# Patient Record
Sex: FEMALE | Race: WHITE | Employment: OTHER | URBAN - METROPOLITAN AREA
[De-identification: names, ages, dates, MRNs, and addresses within clinical notes are randomized per-mention and may not be internally consistent; named-entity substitution may affect disease eponyms.]

---

## 2017-02-24 ENCOUNTER — NURSING HOME VISIT (OUTPATIENT)
Dept: GERIATRICS | Facility: CLINIC | Age: 82
End: 2017-02-24
Payer: COMMERCIAL

## 2017-02-24 VITALS
HEART RATE: 71 BPM | RESPIRATION RATE: 18 BRPM | TEMPERATURE: 97.4 F | DIASTOLIC BLOOD PRESSURE: 78 MMHG | SYSTOLIC BLOOD PRESSURE: 138 MMHG | WEIGHT: 167.4 LBS | OXYGEN SATURATION: 94 % | BODY MASS INDEX: 27.86 KG/M2

## 2017-02-24 DIAGNOSIS — M15.0 PRIMARY OSTEOARTHRITIS INVOLVING MULTIPLE JOINTS: ICD-10-CM

## 2017-02-24 DIAGNOSIS — G30.1 LATE ONSET ALZHEIMER'S DISEASE WITHOUT BEHAVIORAL DISTURBANCE (H): ICD-10-CM

## 2017-02-24 DIAGNOSIS — F02.80 LATE ONSET ALZHEIMER'S DISEASE WITHOUT BEHAVIORAL DISTURBANCE (H): ICD-10-CM

## 2017-02-24 DIAGNOSIS — I10 ESSENTIAL HYPERTENSION: Primary | ICD-10-CM

## 2017-02-24 PROCEDURE — 99310 SBSQ NF CARE HIGH MDM 45: CPT | Performed by: NURSE PRACTITIONER

## 2017-02-24 PROCEDURE — 99207 ZZC CDG-UP CODE -MED NECESSITY: CPT | Performed by: NURSE PRACTITIONER

## 2017-02-24 NOTE — PROGRESS NOTES
[facility]    Chief Complaint   Patient presents with     [facility]PC Regulatory       HPI:    Lesvia Barney is a 88 year old  (10/29/1928), who is being seen today for a federally mandated E/M visit at [facility].       Today's concerns are:  Essential hypertension  BP readings range:  138/78  134/65  156/87  142/76  160/89  165/93  135/61  122/79  131/68  143/98  -Denies chest pain, SOB, or palpation    Late onset Alzheimer's disease without behavioral disturbance  BIMS score = 14/15.   -No behaviors noted by staff    Primary osteoarthritis involving multiple joints  -Denies pain       ALLERGIES: No known allergies  PAST MEDICAL HISTORY:  has a past medical history of HTN (hypertension). She also has no past medical history of Chronic infection; Malignant hyperthermia; or Sleep apnea.  PAST SURGICAL HISTORY:  has a past surgical history that includes appendectomy; Open reduction internal fixation hip nailing (1/12/2014); Open reduction internal fixation hip (unk, prior to 2014); Arthroplasty hip (Right, 8/31/2015); and Remove hardware hip nailing (Right, 8/31/2015).  FAMILY HISTORY: family history includes C.A.D. in her father; Prostate Cancer in her brother.  SOCIAL HISTORY:  reports that she has never smoked. She does not have any smokeless tobacco history on file. She reports that she drinks alcohol. She reports that she does not use illicit drugs.    MEDICATIONS:  Current Outpatient Prescriptions   Medication Sig Dispense Refill     Oseltamivir Phosphate (TAMIFLU PO) Take 75 mg by mouth daily       senna-docusate (SENOKOT-S;PERICOLACE) 8.6-50 MG per tablet Take 1 tablet by mouth daily as needed for constipation       trolamine salicylate (ASPERCREME) 10 % cream Apply topically 3 times daily as needed Apply to left knee        Acetaminophen (TYLENOL PO) Take 650 mg by mouth every 4 hours as needed for mild pain or fever       ORDER FOR DME Equipment being ordered: Walker  Wheels () and Walker ()  Treatment Diagnosis: Impaired functional mobility tolerance and safety concerns with gait and transfer skills. 1 each 0     multivitamin, therapeutic with minerals (THERA-VIT-M) TABS Take 1 tablet by mouth daily         Case Management:  I have reviewed the care plan and MDS and do agree with the plan. Patient's desire to return to the community is not present.  Information reviewed:  Medications, vital signs, orders, and nursing notes.    ROS:  Unobtainable secondary to cognitive impairment or aphasia.    Exam:  /78  Pulse 71  Temp 97.4  F (36.3  C)  Resp 18  Wt 167 lb 6.4 oz (75.9 kg)  SpO2 94%  BMI 27.86 kg/m2  GENERAL APPEARANCE:  Alert  ENT:  Mouth and posterior oropharynx normal, moist mucous membranes  RESP:  respiratory effort and palpation of chest normal, lungs clear to auscultation   CV:  Palpation and auscultation of heart done , regular rate and rhythm, no murmur, rub, or gallop  M/S:   active movement of bilateral upper and lower extremities   SKIN:  Inspection of skin and subcutaneous tissue baseline, Palpation of skin and subcutaneous tissue baseline  NEURO:   Cranial nerves 2-12 are normal tested and grossly at patient's baseline  PSYCH:  memory impaired , affect and mood normal    Lab/Diagnostic data:      CBC RESULTS:   Recent Labs   Lab Test 12/13/16 11/17/15   WBC  7.5  8.2   RBC  5.30  5.24   HGB  16.0  15.8   HCT  50.6*  50.4*   MCV  96  96   MCH  30.2  30.2   MCHC  31.6*  31.3*   RDW  14.3  13.2   PLT  254  303       Last Basic Metabolic Panel:  Recent Labs   Lab Test 12/13/16 11/17/15   NA  139  141   POTASSIUM  4.8  4.6   CHLORIDE  108*  106   JANELLE  9.0  9.6   CO2  22  25   BUN  17  18   CR  0.95  0.80   GLC  78  92       Liver Function Studies -   Recent Labs   Lab Test  01/11/14   1455   PROTTOTAL  6.9   ALBUMIN  3.8   BILITOTAL  0.5   ALKPHOS  63   AST  26   ALT  33         ASSESSMENT/PLAN  (I10) Essential hypertension  (primary encounter  diagnosis)  -Stable without medication   -Keep SBP> 130 mmHg and DBP > 65 mmHg (levels below these increase mortality as shown by standard studies and observations).     (G30.1,  F02.80) Late onset Alzheimer's disease without behavioral disturbance  -Chronic, progressive. Needs 24 hour skilled nursing care. HPI/ROS difficult to obtain with cognitive impairment. Expect further functional and cognitive decline. Expect weight loss. Continue 24 hour care. Monitor weight. Monitor functional status. Monitor for behavioral disturbances.    (M15.0) Primary osteoarthritis involving multiple joints  -asymptomatic   -ambulates with walker      Total time spent with patient visit was 35 min including patient visit and review of past records.Greater than 50% of total time spent with counseling and coordinating care.    Electronically signed by:  GI Ash CNP

## 2017-03-01 ENCOUNTER — TRANSFERRED RECORDS (OUTPATIENT)
Dept: HEALTH INFORMATION MANAGEMENT | Facility: CLINIC | Age: 82
End: 2017-03-01

## 2017-03-01 LAB
ANION GAP SERPL CALCULATED.3IONS-SCNC: 9 MMOL/L (ref 5–18)
BUN SERPL-MCNC: 11 MG/DL (ref 8–28)
CALCIUM SERPL-MCNC: 10.1 MG/DL (ref 8.5–10.5)
CHLORIDE SERPLBLD-SCNC: 106 MMOL/L (ref 98–107)
CO2 SERPL-SCNC: 26 MMOL/L (ref 22–31)
CREAT SERPL-MCNC: 0.9 MG/DL (ref 0.6–1.1)
ERYTHROCYTE [DISTWIDTH] IN BLOOD BY AUTOMATED COUNT: 14.3 % (ref 11–14.5)
GFR SERPL CREATININE-BSD FRML MDRD: 59 ML/MIN/1.73M2
GLUCOSE SERPL-MCNC: 98 MG/DL (ref 70–125)
HCT VFR BLD AUTO: 53.7 % (ref 35–47)
HEMOGLOBIN: 16.8 G/DL (ref 12–16)
MCH RBC QN AUTO: 30 PG (ref 27–34)
MCHC RBC AUTO-ENTMCNC: 31.3 G/DL (ref 32–36)
MCV RBC AUTO: 96 FL (ref 80–100)
PLATELET # BLD AUTO: 320 THOU/UL (ref 140–440)
POTASSIUM SERPL-SCNC: 4.3 MMOL/L (ref 3.5–5)
RBC # BLD AUTO: 5.6 MILL/UL (ref 3.8–5.4)
SODIUM SERPL-SCNC: 141 MMOL/L (ref 136–145)
WBC # BLD AUTO: 9.7 THOU/UL (ref 4–11)

## 2017-04-07 ENCOUNTER — CLINICAL UPDATE (OUTPATIENT)
Dept: PHARMACY | Facility: CLINIC | Age: 82
End: 2017-04-07

## 2017-04-07 NOTE — PROGRESS NOTES
This patient's medication list and chart were reviewed as part of the service provided by [facility].    Assessment/Recommendations:  No recommendations for changes at this time.    Meena Lerner, Pharm.D.,[facility] [place]  Board Certified Geriatric Pharmacist  Medication Therapy Management Pharmacist  [phone]

## 2017-04-10 ENCOUNTER — NURSING HOME VISIT (OUTPATIENT)
Dept: GERIATRICS | Facility: CLINIC | Age: 82
End: 2017-04-10
Payer: COMMERCIAL

## 2017-04-10 VITALS
DIASTOLIC BLOOD PRESSURE: 76 MMHG | SYSTOLIC BLOOD PRESSURE: 123 MMHG | HEART RATE: 84 BPM | RESPIRATION RATE: 18 BRPM | WEIGHT: 166.5 LBS | TEMPERATURE: 97.9 F | BODY MASS INDEX: 27.71 KG/M2 | OXYGEN SATURATION: 98 %

## 2017-04-10 DIAGNOSIS — I10 ESSENTIAL HYPERTENSION: Primary | ICD-10-CM

## 2017-04-10 DIAGNOSIS — F02.80 LATE ONSET ALZHEIMER'S DISEASE WITHOUT BEHAVIORAL DISTURBANCE (H): ICD-10-CM

## 2017-04-10 DIAGNOSIS — G30.1 LATE ONSET ALZHEIMER'S DISEASE WITHOUT BEHAVIORAL DISTURBANCE (H): ICD-10-CM

## 2017-04-10 PROCEDURE — 99308 SBSQ NF CARE LOW MDM 20: CPT | Performed by: INTERNAL MEDICINE

## 2017-04-10 PROCEDURE — 99207 ZZC CDG-CORRECTLY CODED, REVIEWED AND AGREE: CPT | Performed by: INTERNAL MEDICINE

## 2017-04-10 NOTE — PROGRESS NOTES
"Lesvia Barney is a 88 year old female seen April 10, 2017 at [facility] where she has resided for one and a half years (admit 2015) seen for routine visit.    Patient is seen in her room, sitting idly.  Smiling and pleasant.   She reports \"I feel good.   I never smoked, and I'm [place], so I'm healthy.\"    Patient has dementia, unable to manage independently in the community, unsafe living conditions there and she was not receiving appropriate medical care.   Doing much better in [facility].         No behavioral concerns reported by nursing staff.    Has HTN by history, but sbps here fairly stable without anti-hypertensive tx; recently 122-165 sbps.    She has prns available but does not take any scheduled meds.          Past Medical History   Diagnosis Date     HTN (hypertension)    Grade I LV diastolic dysfunction, EF 55-60% by ECHO   Dementia, Alzheimer's type  DJD   S/P femoral neck fracture , with subsequent AVN requiring ATIYA revision with hardware removal 2015      SH:  Single, retired from working in a bank.   Her brother  6 years ago leaving her without any close kin.   A neighbor Sarah is her POA.     ROS:  Negative except for above    EXAM:  NAD  There were no vitals taken for this visit.   Neck supple without adenopathy  Lungs with decreased BS, no rales or wheeze  Heart RRR s1s2, without murmur  Abd soft, NT, no distention, +BS, no masses, no rebound or guarding.     Ext trace ankle edema L>R, valgus deformity of left knee  Neuro: no tremor or stiffness, +wordfinding difficulty.     Lab Results   Component Value Date    WBC 9.7 2017     Lab Results   Component Value Date    HGB 16.8 2017     Lab Results   Component Value Date    MCV 96 2017     Lab Results   Component Value Date     2017      Last Basic Metabolic Panel:  Lab Results   Component Value Date     2017      Lab Results   Component Value Date    POTASSIUM 4.3 " 03/01/2017     Lab Results   Component Value Date    CHLORIDE 106 03/01/2017     Lab Results   Component Value Date    JANELLE 10.1 03/01/2017     Lab Results   Component Value Date    CO2 26 03/01/2017     Lab Results   Component Value Date    BUN 11 03/01/2017     Lab Results   Component Value Date    CR 0.90 03/01/2017     Lab Results   Component Value Date    GLC 98 03/01/2017         IMP/PLAN:   Hip DJD / Right hip pain  Comment: s/p removal of hardware from previous ORIF, and new ATIYA placment     Plan: Ambulation with walker only.       Dementia in conditions classified elsewhere without behavioral disturbance, Alzheimer's type  Comment:  gradual decline over past few years, now with loss of language and functional status  Plan: Board and Care for assist with meals, meds, cares, activity.       HTN (hypertension)  Comment:  SBPs a little higher recently.    Plan: continue to follow bps, would add lisinopril if SBPs consistently >140        Advanced directives: DNR/DNI.   Friend Sarah Irving is first contact.      Nabila Velasquez MD

## 2017-06-20 ENCOUNTER — NURSING HOME VISIT (OUTPATIENT)
Dept: GERIATRICS | Facility: CLINIC | Age: 82
End: 2017-06-20
Payer: COMMERCIAL

## 2017-06-20 VITALS
SYSTOLIC BLOOD PRESSURE: 143 MMHG | WEIGHT: 163.5 LBS | BODY MASS INDEX: 25.66 KG/M2 | HEIGHT: 67 IN | HEART RATE: 76 BPM | RESPIRATION RATE: 18 BRPM | OXYGEN SATURATION: 95 % | DIASTOLIC BLOOD PRESSURE: 86 MMHG | TEMPERATURE: 97 F

## 2017-06-20 DIAGNOSIS — I10 ESSENTIAL HYPERTENSION: Primary | ICD-10-CM

## 2017-06-20 DIAGNOSIS — M15.0 PRIMARY OSTEOARTHRITIS INVOLVING MULTIPLE JOINTS: ICD-10-CM

## 2017-06-20 DIAGNOSIS — F02.80 LATE ONSET ALZHEIMER'S DISEASE WITHOUT BEHAVIORAL DISTURBANCE (H): ICD-10-CM

## 2017-06-20 DIAGNOSIS — G30.1 LATE ONSET ALZHEIMER'S DISEASE WITHOUT BEHAVIORAL DISTURBANCE (H): ICD-10-CM

## 2017-06-20 PROCEDURE — 99310 SBSQ NF CARE HIGH MDM 45: CPT | Performed by: NURSE PRACTITIONER

## 2017-06-20 PROCEDURE — 99207 ZZC CDG-UP CODE -MED NECESSITY: CPT | Performed by: NURSE PRACTITIONER

## 2017-06-20 NOTE — PROGRESS NOTES
[place] [facility]    Chief Complaint   Patient presents with     [facility] Regulatory       HPI:    Lesvia Barney is a 88 year old  (10/29/1928), who is being seen today for a federally mandated E/M visit at [facility].  HPI information obtained from: facility chart records.     Today's concerns are:  Essential hypertension  Denies CP, Palpation, or SOB  -Currently off antihypertensive agents     Late onset Alzheimer's disease without behavioral disturbance  -Last BIMS 12/15  -Behavior and mood improved since admission to memory care.   -Reports everything is fine.     Primary osteoarthritis involving multiple joints  -Denies pain.   -Currently has tylenol PRN      BP 5/12-6/16: 101-143/69-86 mmHg  Weight 5/2-6/17: 155- 163.5lbs         ALLERGIES: No known allergies  PAST MEDICAL HISTORY:  has a past medical history of HTN (hypertension). She also has no past medical history of Chronic infection; Malignant hyperthermia; or Sleep apnea.  PAST SURGICAL HISTORY:  has a past surgical history that includes appendectomy; Open reduction internal fixation hip nailing (1/12/2014); Open reduction internal fixation hip (unk, prior to 2014); Arthroplasty hip (Right, 8/31/2015); and Remove hardware hip nailing (Right, 8/31/2015).  FAMILY HISTORY: family history includes C.A.D. in her father; Prostate Cancer in her brother.  SOCIAL HISTORY:  reports that she has never smoked. She does not have any smokeless tobacco history on file. She reports that she drinks alcohol. She reports that she does not use illicit drugs.    MEDICATIONS:  Current Outpatient Prescriptions   Medication Sig Dispense Refill     AMOXICILLIN PO Give 2 gram by mouth as needed for Prophylaxis Dental Appointment       senna-docusate (SENOKOT-S;PERICOLACE) 8.6-50 MG per tablet Take 1 tablet by mouth daily as needed for constipation       trolamine salicylate (ASPERCREME) 10 % cream Apply topically 3 times daily as needed Apply  "to left knee        Acetaminophen (TYLENOL PO) Take 650 mg by mouth every 4 hours as needed for mild pain or fever       ORDER FOR DME Equipment being ordered: Walker Wheels () and Walker ()  Treatment Diagnosis: Impaired functional mobility tolerance and safety concerns with gait and transfer skills. 1 each 0     multivitamin, therapeutic with minerals (THERA-VIT-M) TABS Take 1 tablet by mouth daily       Medications reviewed:  Medications reconciled to facility chart and changes were made to reflect current medications as identified as above med list. Below are the changes that were made:   Medications stopped since last EPIC medication reconciliation:   There are no discontinued medications.    Medications started since last [facility] medication reconciliation:  No orders of the defined types were placed in this encounter.        Case Management:  I have reviewed the care plan and MDS and do agree with the plan. Patient's desire to return to the community is present, but is not able due to care needs .  Information reviewed:  Medications, vital signs, orders, and nursing notes.    ROS:  Unobtainable secondary to cognitive impairment or aphasia. and 4 point ROS including Respiratory, CV, GI and , other than that noted in the HPI,  is negative    Exam:  Vitals: /86  Pulse 76  Temp 97  F (36.1  C)  Resp 18  Ht 5' 7\" (1.702 m)  Wt 163 lb 8 oz (74.2 kg)  SpO2 95%  BMI 25.61 kg/m2  BMI= Body mass index is 25.61 kg/(m^2).  GENERAL APPEARANCE:  Alert, in no distress  ENT:  Mouth and posterior oropharynx normal, moist mucous membranes  EYES:  EOM, conjunctivae, lids, pupils and irises normal  RESP:  respiratory effort and palpation of chest normal, lungs clear to auscultation   CV:  Palpation and auscultation of heart done , regular rate and rhythm, no murmur, rub, or gallop  ABDOMEN:  normal bowel sounds, soft, nontender, no hepatosplenomegaly or other masses  M/S:   Gait and station normal  Digits " and nails normal  SKIN:  Inspection of skin and subcutaneous tissue baseline, Palpation of skin and subcutaneous tissue baseline    Lab/Diagnostic data:   CBC RESULTS:   Recent Labs   Lab Test 03/01/17 12/13/16   WBC  9.7  7.5   RBC  5.60*  5.30   HGB  16.8*  16.0   HCT  53.7*  50.6*   MCV  96  96   MCH  30.0  30.2   MCHC  31.3*  31.6*   RDW  14.3  14.3   PLT  320  254       Last Basic Metabolic Panel:  Recent Labs   Lab Test 03/01/17 12/13/16   NA  141  139   POTASSIUM  4.3  4.8   CHLORIDE  106  108*   JANELLE  10.1  9.0   CO2  26  22   BUN  11  17   CR  0.90  0.95   GLC  98  78     Liver Function Studies -   Recent Labs   Lab Test  01/11/14   1455   PROTTOTAL  6.9   ALBUMIN  3.8   BILITOTAL  0.5   ALKPHOS  63   AST  26   ALT  33       ASSESSMENT/PLAN  (I10) Essential hypertension  (primary encounter diagnosis)  -Chronic, stable off medications  -Keep SBP> 130 mmHg and DBP > 65 mmHg (levels below these increase mortality as shown by standard studies and observations).     (G30.1,  F02.80) Late onset Alzheimer's disease without behavioral disturbance  -resides on secure dementia unit with assistance for ADLs and meals.  -No changes at this time. Continue to monitor mood, behaviors and weight. Notify NP with changes.     (M15.0) Primary osteoarthritis involving multiple joints  -No changes at this time. Continue tylenol PRN.     Orders:  The current medical regimen is effective; continue present plan and medications.    Total time spent with patient visit at the skilled nursing facility was 35 min including patient visit and review of past records. Greater than 50% of total time spent with counseling and coordinating care due to multiple comorbidities     Electronically signed by:  GI Ash CNP  [facility]

## 2017-08-08 ENCOUNTER — NURSING HOME VISIT (OUTPATIENT)
Dept: GERIATRICS | Facility: CLINIC | Age: 82
End: 2017-08-08
Payer: COMMERCIAL

## 2017-08-08 VITALS
RESPIRATION RATE: 18 BRPM | HEART RATE: 80 BPM | BODY MASS INDEX: 24.62 KG/M2 | DIASTOLIC BLOOD PRESSURE: 65 MMHG | OXYGEN SATURATION: 97 % | SYSTOLIC BLOOD PRESSURE: 109 MMHG | TEMPERATURE: 98 F | WEIGHT: 157.2 LBS

## 2017-08-08 DIAGNOSIS — F02.80 LATE ONSET ALZHEIMER'S DISEASE WITHOUT BEHAVIORAL DISTURBANCE (H): ICD-10-CM

## 2017-08-08 DIAGNOSIS — M15.0 PRIMARY OSTEOARTHRITIS INVOLVING MULTIPLE JOINTS: Primary | ICD-10-CM

## 2017-08-08 DIAGNOSIS — G30.1 LATE ONSET ALZHEIMER'S DISEASE WITHOUT BEHAVIORAL DISTURBANCE (H): ICD-10-CM

## 2017-08-08 PROCEDURE — 99309 SBSQ NF CARE MODERATE MDM 30: CPT | Performed by: INTERNAL MEDICINE

## 2017-08-13 NOTE — PROGRESS NOTES
Levsia Barney is a 88 year old female seen 2017 at [facility] where she has resided for 2 years (admit 2015) seen for routine visit.    Patient is seen in Day Room, alert and pleasant, states she feels well.       Patient has dementia, unable to manage independently in the community, unsafe living conditions there and she was not receiving appropriate medical care.   Doing much better in [facility] and now well settled in her routine.      No behavioral concerns reported by nursing staff.    Has HTN by history, but sbps here fairly stable without anti-hypertensive tx; recently 101-143 sbps.    She has prns available but does not take any scheduled meds.      She remains ambulatory with a 4WW but with significant deformity of LE joints, flexed at knees and hips, forward stoop.   Says no to current pain or other symptoms.         Past Medical History   Diagnosis Date     HTN (hypertension)    Grade I LV diastolic dysfunction, EF 55-60% by ECHO   Dementia, Alzheimer's type  DJD   S/P femoral neck fracture , with subsequent AVN requiring ATIYA revision with hardware removal 2015      SH:  Single, retired from working in a bank.   Her brother  6 years ago leaving her without any close kin.   A neighbor Sarah is her POA.     ROS:  Negative except for above    EXAM:  NAD  /65  Pulse 80  Temp 98  F (36.7  C)  Resp 18  Wt 157 lb 3.2 oz (71.3 kg)  SpO2 97%  BMI 24.62 kg/m2   Neck supple without adenopathy  Lungs with decreased BS, no rales or wheeze  Heart RRR s1s2, without murmur  Abd soft, NT, no distention, +BS, no masses, no rebound or guarding.     Ext trace ankle edema L>R, valgus deformity of left knee, and chronic hip flexion  Neuro: no tremor or stiffness, +wordfinding difficulty.    No recent labs    IMP/PLAN:  (M15.0) Primary osteoarthritis involving multiple joints   Comment: significant deforming changes in LE joints;  s/p removal of hardware from previous  ORIF, and ATIYA placment    Plan: Ambulation with walker only.  Local measures and prn acetaminophen should she c/o pain.       (G30.1,  F02.80) Late onset Alzheimer's disease without behavioral disturbance  Comment:  gradual decline over past few years, now with loss of language and functional status  Plan: Board and Care for assist with meals, meds, cares, activity.     If develops behaviors, would look to treat pain more aggressively, given above.       Advanced directives: DNR/DNI.   Friend Sarah Irving is first contact.      Nabila Velasquez MD

## 2017-09-15 ENCOUNTER — NURSING HOME VISIT (OUTPATIENT)
Dept: GERIATRICS | Facility: CLINIC | Age: 82
End: 2017-09-15
Payer: COMMERCIAL

## 2017-09-15 VITALS
SYSTOLIC BLOOD PRESSURE: 140 MMHG | OXYGEN SATURATION: 96 % | BODY MASS INDEX: 25.84 KG/M2 | WEIGHT: 165 LBS | RESPIRATION RATE: 16 BRPM | TEMPERATURE: 97.6 F | DIASTOLIC BLOOD PRESSURE: 88 MMHG | HEART RATE: 69 BPM

## 2017-09-15 DIAGNOSIS — M15.0 PRIMARY OSTEOARTHRITIS INVOLVING MULTIPLE JOINTS: Primary | ICD-10-CM

## 2017-09-15 DIAGNOSIS — G30.1 LATE ONSET ALZHEIMER'S DISEASE WITHOUT BEHAVIORAL DISTURBANCE (H): ICD-10-CM

## 2017-09-15 DIAGNOSIS — I10 ESSENTIAL HYPERTENSION: ICD-10-CM

## 2017-09-15 DIAGNOSIS — F02.80 LATE ONSET ALZHEIMER'S DISEASE WITHOUT BEHAVIORAL DISTURBANCE (H): ICD-10-CM

## 2017-09-15 PROCEDURE — 99318 ZZC ANNUAL NURSING FAC ASSESSMNT, STABLE: CPT | Performed by: NURSE PRACTITIONER

## 2017-09-15 NOTE — PROGRESS NOTES
[place] [facility]  Chief Complaint   Patient presents with     Annual Comprehensive Nursing Home       HPI:    Lesvia Barney is a 88 year old  (10/29/1928), who is being seen today for an annual comprehensive visit at [facility].  HPI information obtained from: facility chart records.      Today's concerns are:  Primary osteoarthritis involving multiple joints  Denies pain during today's visit. Ambulating independently with walker.     Late onset Alzheimer's disease without behavioral disturbance  -Last BIMS 1015  -No behaviors noted by staff.  -She is provided a regular diet with small portions per her request, regular texture and thin liquids. No issues noted with chewing/swallowing per staff. She has been eating % meals.  Accepting 100% HS snack most days. She is eating independently with set up help. Ht: 67 , current weight: 8/5 157.2#, BMI of 24.6 kg/m2.      Essential hypertension  BP range 103-154/60-95  -Denies CP, SOB, or lightheadedness     Based on JNC-8 goals,  patients age of 88 year old, no presence of diabetes or CKD, and goals of care goal BP is <150/90 mm Hg. patient is stable and continue without pharmacological invention with routine assessment.    Depression screen done: PHQ-9 Given screen score and clinical assessment patient is stable without any signs of depression and no futher interventions warrented at this time.      ALLERGIES: No known allergies  PROBLEM LIST:  Patient Active Problem List   Diagnosis     Fall with injury     Femoral neck fracture (H)     HTN (hypertension)     Cognitive impairment     Avascular necrosis of bone of hip, right (H)     S/P prosthetic total arthroplasty of the hip     Late onset Alzheimer's disease without behavioral disturbance     PAST MEDICAL HISTORY:  has a past medical history of HTN (hypertension). She also has no past medical history of Chronic infection; Malignant hyperthermia; or Sleep apnea.  PAST SURGICAL  HISTORY:  has a past surgical history that includes appendectomy; Open reduction internal fixation hip nailing (1/12/2014); Open reduction internal fixation hip (unk, prior to 2014); Arthroplasty hip (Right, 8/31/2015); and Remove hardware hip nailing (Right, 8/31/2015).  FAMILY HISTORY: family history includes C.A.D. in her father; Prostate Cancer in her brother.  SOCIAL HISTORY:  reports that she has never smoked. She does not have any smokeless tobacco history on file. She reports that she drinks alcohol. She reports that she does not use illicit drugs.  IMMUNIZATIONS:  Most Recent Immunizations   Administered Date(s) Administered     Influenza (IIV3) 10/20/2016     Pneumococcal (PCV 13) 10/15/2015     Pneumococcal 23 valent 11/09/1999     Tdap (Adacel,Boostrix) 10/15/2015     Zoster vaccine, live 10/29/2012     Above immunizations pulled from [email or web address]. [facility] and facility records also reconciled. Outstanding information sent to  to update [email or web address].  Future immunizations are not needed at this point as all recommended immunizations are up to date.   MEDICATIONS:  Current Outpatient Prescriptions   Medication Sig Dispense Refill     AMOXICILLIN PO Give 2 gram by mouth as needed for Prophylaxis Dental Appointment       senna-docusate (SENOKOT-S;PERICOLACE) 8.6-50 MG per tablet Take 1 tablet by mouth daily as needed for constipation       trolamine salicylate (ASPERCREME) 10 % cream Apply topically 3 times daily as needed Apply to left knee        Acetaminophen (TYLENOL PO) Take 650 mg by mouth every 4 hours as needed for mild pain or fever       ORDER FOR DME Equipment being ordered: Walker Wheels () and Walker ()  Treatment Diagnosis: Impaired functional mobility tolerance and safety concerns with gait and transfer skills. 1 each 0     multivitamin, therapeutic with minerals (THERA-VIT-M) TABS Take 1 tablet by mouth daily       Medications reviewed:  Medications reconciled to  facility chart and changes were made to reflect current medications as identified as above med list. Below are the changes that were made:   Medications stopped since last EPIC medication reconciliation:   There are no discontinued medications.    Medications started since last [facility] medication reconciliation:  No orders of the defined types were placed in this encounter.      Case Management:  I have reviewed the Assisted Living care plan, current immunizations and preventive care/cancer screening..Future cancer screening is not clinically indicated secondary to age/goals of care Patient's desire to return to the community is not assessible due to cognitive impairment. Current Level of Care is appropriate.    Advance Directive Discussion:    I reviewed the current advanced directives as reflected in EPIC, the POLST and the facility chart, and verified the congruency of orders. I contacted the first party Sarah Irving and left a message regarding the plan of Care.  I did not due to cognitive impairment review the advance directives with the resident.     Team Discussion:  I communicated with the appropriate disciplines involved with the Plan of Care with no changes at this time. I agree with current POC.     Patient Goal:  Patient's goal is pain control and comfort.    Information reviewed:  Medications, vital signs, orders, and nursing notes.    ROS:  Unobtainable secondary to cognitive impairment or aphasia.    Exam:  /88  Pulse 69  Temp 97.6  F (36.4  C)  Resp 16  Wt 165 lb (74.8 kg)  SpO2 96%  BMI 25.84 kg/m2    GENERAL APPEARANCE:  Alert, in no distress  ENT:  Mouth and posterior oropharynx normal, moist mucous membranes  RESP:  respiratory effort and palpation of chest normal, lungs clear to auscultation , no respiratory distress  CV:  Palpation and auscultation of heart done , regular rate and rhythm, no murmur, rub, or gallop  ABDOMEN:  normal bowel sounds, soft, nontender, no hepatosplenomegaly or  other masses  M/S:   Gait and station normal  Digits and nails normal  SKIN:  Inspection of skin and subcutaneous tissue baseline, Palpation of skin and subcutaneous tissue baseline  NEURO:   Cranial nerves 2-12 are normal tested and grossly at patient's baseline  PSYCH:  insight and judgement impaired, memory impaired , affect and mood normal     BP Readin/74, 139/77, 109/65          HR:  66-69    Lab/Diagnostic data:    CBC RESULTS:   Recent Labs   Lab Test 17   WBC  9.7  7.5   RBC  5.60*  5.30   HGB  16.8*  16.0   HCT  53.7*  50.6*   MCV  96  96   MCH  30.0  30.2   MCHC  31.3*  31.6*   RDW  14.3  14.3   PLT  320  254       Last Basic Metabolic Panel:  Recent Labs   Lab Test 17   NA  141  139   POTASSIUM  4.3  4.8   CHLORIDE  106  108*   JANELLE  10.1  9.0   CO2  26  22   BUN  11  17   CR  0.90  0.95   GLC  98  78       Liver Function Studies -   Recent Labs   Lab Test  14   1455   PROTTOTAL  6.9   ALBUMIN  3.8   BILITOTAL  0.5   ALKPHOS  63   AST  26   ALT  33       ASSESSMENT/PLAN    (M15.0) Primary osteoarthritis involving multiple joints   Comment: significant deforming changes in LE joints;  s/p removal of hardware from previous ORIF, and ATIYA placment    Plan: Encourage ambulation with walker only. Continue acetaminophen 650 mg PO q 4 hours PRN.     (G30.1,  F02.80) Late onset Alzheimer's disease without behavioral disturbance  Comment: Gradual decline over past few years, now with some loss of language and functional status. Has adjusted well to room change to 3rd floor B&C.   Plan: Continue board and Care for assist with meals, meds, cares, activity.     If develops behaviors, would look to treat pain more aggressively, given above.        Orders:  The current medical regimen is effective; continue present plan and medications.    Electronically signed by:  GI Hercules CNP

## 2017-10-02 ENCOUNTER — NURSING HOME VISIT (OUTPATIENT)
Dept: GERIATRICS | Facility: CLINIC | Age: 82
End: 2017-10-02
Payer: COMMERCIAL

## 2017-10-02 VITALS
HEART RATE: 77 BPM | OXYGEN SATURATION: 92 % | BODY MASS INDEX: 25.18 KG/M2 | RESPIRATION RATE: 18 BRPM | TEMPERATURE: 98 F | DIASTOLIC BLOOD PRESSURE: 83 MMHG | SYSTOLIC BLOOD PRESSURE: 145 MMHG | WEIGHT: 160.8 LBS

## 2017-10-02 DIAGNOSIS — M15.0 PRIMARY OSTEOARTHRITIS INVOLVING MULTIPLE JOINTS: Primary | ICD-10-CM

## 2017-10-02 DIAGNOSIS — F02.80 LATE ONSET ALZHEIMER'S DISEASE WITHOUT BEHAVIORAL DISTURBANCE (H): ICD-10-CM

## 2017-10-02 DIAGNOSIS — G30.1 LATE ONSET ALZHEIMER'S DISEASE WITHOUT BEHAVIORAL DISTURBANCE (H): ICD-10-CM

## 2017-10-02 PROCEDURE — 99318 ZZC ANNUAL NURSING FAC ASSESSMNT, STABLE: CPT | Performed by: NURSE PRACTITIONER

## 2017-10-02 NOTE — PROGRESS NOTES
[place] [facility]  Chief Complaint   Patient presents with     Annual Comprehensive Nursing Home       HPI:    Lesvia Barney is a 88 year old  (10/29/1928), who is being seen today for an annual comprehensive visit at [facility].  HPI information obtained from: facility chart records, facility staff and patient report.      Today's concerns are:  Primary osteoarthritis involving multiple joints  -Resident pleasant and cooperative during today's visit. Reports she is not having any pain and really never does.   -Currently taking tylenol 650 mg/q 4 hours PRN and aspercreme.     Late onset Alzheimer's disease without behavioral disturbance  -No recent behaviors noted.   -BIMS 10/15      Based on JNC-8 goals,  patients age of 88 year old, no presence of diabetes or CKD, and goals of care goal BP is <150/90 mm Hg. patient is stable and continue without pharmacological invention with routine assessment.    Depression screen done: PHQ-9 Given screen score and clinical assessment patient is stable without any signs of depression and no futher interventions warrented at this time.      ALLERGIES: No known allergies  PROBLEM LIST:  Patient Active Problem List   Diagnosis     Fall with injury     Femoral neck fracture (H)     HTN (hypertension)     Cognitive impairment     Avascular necrosis of bone of hip, right (H)     S/P prosthetic total arthroplasty of the hip     Late onset Alzheimer's disease without behavioral disturbance     PAST MEDICAL HISTORY:  has a past medical history of HTN (hypertension). She also has no past medical history of Chronic infection; Malignant hyperthermia; or Sleep apnea.  PAST SURGICAL HISTORY:  has a past surgical history that includes appendectomy; Open reduction internal fixation hip nailing (1/12/2014); Open reduction internal fixation hip (unk, prior to 2014); Arthroplasty hip (Right, 8/31/2015); and Remove hardware hip nailing (Right, 8/31/2015).  FAMILY  HISTORY: family history includes C.A.D. in her father; Prostate Cancer in her brother.  SOCIAL HISTORY:  reports that she has never smoked. She does not have any smokeless tobacco history on file. She reports that she drinks alcohol. She reports that she does not use illicit drugs.  IMMUNIZATIONS:  Most Recent Immunizations   Administered Date(s) Administered     Influenza (IIV3) 10/20/2016     Pneumococcal (PCV 13) 10/15/2015     Pneumococcal 23 valent 11/09/1999     Tdap (Adacel,Boostrix) 10/15/2015     Zoster vaccine, live 10/29/2012     Above immunizations pulled from [facility]. [facility]IC and facility records also reconciled. Outstanding information sent to  to update [facility].  Future immunizations are not needed at this point as all recommended immunizations are up to date.   MEDICATIONS:  Current Outpatient Prescriptions   Medication Sig Dispense Refill     AMOXICILLIN PO Give 2 gram by mouth as needed for Prophylaxis Dental Appointment       senna-docusate (SENOKOT-S;PERICOLACE) 8.6-50 MG per tablet Take 1 tablet by mouth daily as needed for constipation       trolamine salicylate (ASPERCREME) 10 % cream Apply topically 3 times daily as needed Apply to left knee        Acetaminophen (TYLENOL PO) Take 650 mg by mouth every 4 hours as needed for mild pain or fever       ORDER FOR DME Equipment being ordered: Walker Wheels () and Walker ()  Treatment Diagnosis: Impaired functional mobility tolerance and safety concerns with gait and transfer skills. 1 each 0     multivitamin, therapeutic with minerals (THERA-VIT-M) TABS Take 1 tablet by mouth daily       Medications reviewed:  Medications reconciled to facility chart and changes were made to reflect current medications as identified as above med list. Below are the changes that were made:   Medications stopped since last EPIC medication reconciliation:   There are no discontinued medications.    Medications started since last EPIC  medication reconciliation:  No orders of the defined types were placed in this encounter.    Case Management:  I have reviewed the Assisted Living care plan, current immunizations and preventive care/cancer screening..Future cancer screening is not clinically indicated secondary to age/goals of care Patient's desire to return to the community is not assessible due to cognitive impairment. Current Level of Care is appropriate.    Advance Directive Discussion:    I reviewed the current advanced directives as reflected in EPIC, the POLST and the facility chart, and verified the congruency of orders. I contacted the first party Sarah Maria Fernanda and left a message regarding the plan of Care.  I did not due to cognitive impairment review the advance directives with the resident.     Team Discussion:  I communicated with the appropriate disciplines involved with the Plan of Care: All disciplines agree with POC.     Patient Goal:  Patient's goal is pain control and comfort.    Information reviewed:  Medications, vital signs, orders, and nursing notes.    ROS:  Unobtainable secondary to cognitive impairment or aphasia.    Exam:  /83  Pulse 77  Temp 98  F (36.7  C)  Resp 18  Wt 160 lb 12.8 oz (72.9 kg)  SpO2 92%  BMI 25.18 kg/m2    GENERAL APPEARANCE:  Alert, in no distress  ENT:  Mouth and posterior oropharynx normal, moist mucous membranes, [place]  RESP:  respiratory effort and palpation of chest normal, lungs clear to auscultation , no respiratory distress  CV:  regular rate and rhythm, no murmur, rub, or gallop  ABDOMEN:  normal bowel sounds, soft, nontender, no hepatosplenomegaly or other masses  M/S:   Gait and station normal  Digits and nails normal  SKIN:  Inspection of skin and subcutaneous tissue baseline, Palpation of skin and subcutaneous tissue baseline  NEURO:   Cranial nerves 2-12 are normal tested and grossly at patient's baseline  PSYCH:  insight and judgement impaired, memory impaired , affect and mood  normal     BP Readin/76, 154/95, 140/88         HR:  74-78    Lab/Diagnostic data:    CBC RESULTS:   Recent Labs   Lab Test 17   WBC  9.7  7.5   RBC  5.60*  5.30   HGB  16.8*  16.0   HCT  53.7*  50.6*   MCV  96  96   MCH  30.0  30.2   MCHC  31.3*  31.6*   RDW  14.3  14.3   PLT  320  254       Last Basic Metabolic Panel:  Recent Labs   Lab Test 17   NA  141  139   POTASSIUM  4.3  4.8   CHLORIDE  106  108*   JANELLE  10.1  9.0   CO2  26  22   BUN  11  17   CR  0.90  0.95   GLC  98  78       Liver Function Studies -   Recent Labs   Lab Test  14   1455   PROTTOTAL  6.9   ALBUMIN  3.8   BILITOTAL  0.5   ALKPHOS  63   AST  26   ALT  33         ASSESSMENT/PLAN  (M15.0) Primary osteoarthritis involving multiple joints  (primary encounter diagnosis)  Ambulation with walker only- continue to encourage as resident is not always compliant.   -Continue PRN tylenol and aspercreme    (G30.1,  F02.80) Late onset Alzheimer's disease without behavioral disturbance  -Chronic, progressive. HPI/ROS difficult to obtain with cognitive impairment. Expect further functional and cognitive decline. Expect weight loss. Monitor weight. Monitor functional status. Monitor for behavioral disturbances.    Orders:  The current medical regimen is effective; continue present plan and medications.    Electronically signed by:  GI Hercules CNP

## 2017-10-03 ENCOUNTER — TRANSFERRED RECORDS (OUTPATIENT)
Dept: HEALTH INFORMATION MANAGEMENT | Facility: CLINIC | Age: 82
End: 2017-10-03

## 2017-10-03 LAB
ANION GAP SERPL CALCULATED.3IONS-SCNC: 11 MMOL/L (ref 5–18)
BUN SERPL-MCNC: 13 MG/DL (ref 8–28)
CALCIUM SERPL-MCNC: 9.9 MG/DL (ref 8.5–10.5)
CHLORIDE SERPLBLD-SCNC: 105 MMOL/L (ref 98–107)
CO2 SERPL-SCNC: 26 MMOL/L (ref 22–31)
CREAT SERPL-MCNC: 0.84 MG/DL (ref 0.6–1.1)
ERYTHROCYTE [DISTWIDTH] IN BLOOD BY AUTOMATED COUNT: 13.8 % (ref 11–14.5)
GFR SERPL CREATININE-BSD FRML MDRD: >60 ML/MIN/1.73M2
GLUCOSE SERPL-MCNC: 66 MG/DL (ref 70–125)
HCT VFR BLD AUTO: 53.8 % (ref 35–47)
HEMOGLOBIN: 17.2 G/DL (ref 12–16)
MCH RBC QN AUTO: 30.9 PG (ref 27–34)
MCHC RBC AUTO-ENTMCNC: 32 G/DL (ref 32–36)
MCV RBC AUTO: 97 FL (ref 80–100)
PLATELET # BLD AUTO: 278 10^9/L (ref 140–440)
POTASSIUM SERPL-SCNC: 4.1 MMOL/L (ref 3.5–5)
RBC # BLD AUTO: 5.57 MILL/UL (ref 3.8–5.4)
SODIUM SERPL-SCNC: 142 MMOL/L (ref 136–145)
WBC # BLD AUTO: 8.3 THOU/UL (ref 4–11)

## 2017-11-17 ENCOUNTER — NURSING HOME VISIT (OUTPATIENT)
Dept: GERIATRICS | Facility: CLINIC | Age: 82
End: 2017-11-17
Payer: COMMERCIAL

## 2017-11-17 VITALS
SYSTOLIC BLOOD PRESSURE: 152 MMHG | RESPIRATION RATE: 18 BRPM | WEIGHT: 160.2 LBS | HEART RATE: 86 BPM | BODY MASS INDEX: 25.09 KG/M2 | DIASTOLIC BLOOD PRESSURE: 88 MMHG | TEMPERATURE: 98.4 F | OXYGEN SATURATION: 94 %

## 2017-11-17 DIAGNOSIS — H61.23 BILATERAL IMPACTED CERUMEN: ICD-10-CM

## 2017-11-17 DIAGNOSIS — H61.20 WAX IN EAR: Primary | ICD-10-CM

## 2017-11-17 PROCEDURE — 99308 SBSQ NF CARE LOW MDM 20: CPT | Performed by: NURSE PRACTITIONER

## 2017-11-17 NOTE — PROGRESS NOTES
[place] [facility]    Chief Complaint   Patient presents with     Cerumen Impaction       HPI:    Lesvia Barney is a 89 year old  (10/29/1928), who is being seen today for an episodic care visit at [facility].  HPI information obtained from: facility chart records, facility staff and patient report.    Today's concern is:  Wax in ear   Resident simi meadeian 2/2 cognitive impairment. Is complaining of bilateral ear fullness. No changes in hearing.     ALLERGIES: No known allergies  Past Medical, Surgical, Family and Social History reviewed and updated in [facility].    Current Outpatient Prescriptions   Medication Sig Dispense Refill     AMOXICILLIN PO Give 2 gram by mouth as needed for Prophylaxis Dental Appointment       senna-docusate (SENOKOT-S;PERICOLACE) 8.6-50 MG per tablet Take 1 tablet by mouth daily as needed for constipation       trolamine salicylate (ASPERCREME) 10 % cream Apply topically 3 times daily as needed Apply to left knee        Acetaminophen (TYLENOL PO) Take 650 mg by mouth every 4 hours as needed for mild pain or fever       ORDER FOR DME Equipment being ordered: Walker Wheels () and Walker ()  Treatment Diagnosis: Impaired functional mobility tolerance and safety concerns with gait and transfer skills. 1 each 0     multivitamin, therapeutic with minerals (THERA-VIT-M) TABS Take 1 tablet by mouth daily       Medications reviewed:  Medications reconciled to facility chart and changes were made to reflect current medications as identified as above med list. Below are the changes that were made:   Medications stopped since last EPIC medication reconciliation:   There are no discontinued medications.    Medications started since last [facility] medication reconciliation:  No orders of the defined types were placed in this encounter.      REVIEW OF SYSTEMS:  Unobtainable secondary to cognitive impairment or aphasia.    Physical Exam:  /88  Pulse 86  Temp 98.4   F (36.9  C)  Resp 18  Wt 160 lb 3.2 oz (72.7 kg)  SpO2 94%  BMI 25.09 kg/m2  GENERAL APPEARANCE:  Alert, in no distress  ENT:  Refused ear exam  RESP:  respiratory effort and palpation of chest normal, lungs clear to auscultation , no respiratory distress  CV:  Palpation and auscultation of heart done , regular rate and rhythm, no murmur, rub, or gallop     BP Readin/72, 147/78, 160/89           HR:  61-86    Recent Labs:    CBC RESULTS:   Recent Labs   Lab Test 10/03/17 03/01/17   WBC  8.3  9.7   RBC  5.57*  5.60*   HGB  17.2*  16.8*   HCT  53.8*  53.7*   MCV  97  96   MCH  30.9  30.0   MCHC  32.0  31.3*   RDW  13.8  14.3   PLT  278  320       Last Basic Metabolic Panel:  Recent Labs   Lab Test 10/03/17 03/01/17   NA  142  141   POTASSIUM  4.1  4.3   CHLORIDE  105  106   JANELLE  9.9  10.1   CO2  26  26   BUN  13  11   CR  0.84  0.90   GLC  66*  98       Liver Function Studies -   Recent Labs   Lab Test  14   1455   PROTTOTAL  6.9   ALBUMIN  3.8   BILITOTAL  0.5   ALKPHOS  63   AST  26   ALT  33     Assessment/Plan:  (H61.20) Wax in ear  (primary encounter diagnosis)  Signs and symptoms consistent with impacted cerumen.   -Debrox otic solution 5-10 gtt bilateral ears BID x 4 days.   -Continue to monitor and notify NP with changes.     Orders:  See above     Total time spent with patient visit at the [facility] nursing [facility] was 15 min including patient visit and review of past records. Greater than 50% of total time spent with counseling and coordinating care due to impacted cerumen bilateral ears    Electronically signed by  GI Hercules CNP

## 2017-12-15 ENCOUNTER — NURSING HOME VISIT (OUTPATIENT)
Dept: GERIATRICS | Facility: CLINIC | Age: 82
End: 2017-12-15
Payer: COMMERCIAL

## 2017-12-15 DIAGNOSIS — F02.80 LATE ONSET ALZHEIMER'S DISEASE WITHOUT BEHAVIORAL DISTURBANCE (H): ICD-10-CM

## 2017-12-15 DIAGNOSIS — D45 PRIMARY POLYCYTHEMIA (H): Primary | ICD-10-CM

## 2017-12-15 DIAGNOSIS — M15.0 PRIMARY OSTEOARTHRITIS INVOLVING MULTIPLE JOINTS: ICD-10-CM

## 2017-12-15 DIAGNOSIS — I10 ESSENTIAL HYPERTENSION: ICD-10-CM

## 2017-12-15 DIAGNOSIS — G30.1 LATE ONSET ALZHEIMER'S DISEASE WITHOUT BEHAVIORAL DISTURBANCE (H): ICD-10-CM

## 2017-12-15 PROCEDURE — 99309 SBSQ NF CARE MODERATE MDM 30: CPT | Performed by: INTERNAL MEDICINE

## 2017-12-16 VITALS
DIASTOLIC BLOOD PRESSURE: 82 MMHG | HEART RATE: 65 BPM | TEMPERATURE: 98 F | OXYGEN SATURATION: 97 % | WEIGHT: 158.8 LBS | RESPIRATION RATE: 18 BRPM | SYSTOLIC BLOOD PRESSURE: 129 MMHG | BODY MASS INDEX: 24.87 KG/M2

## 2017-12-24 PROBLEM — M15.0 PRIMARY OSTEOARTHRITIS INVOLVING MULTIPLE JOINTS: Status: ACTIVE | Noted: 2017-12-24

## 2017-12-24 NOTE — PROGRESS NOTES
Lesvia Barney is a 89 year old female seen December 15, 2017 at [facility] where she has resided for 2 years (admit 2015) seen for routine visit.    Patient is seen in Day Room, alert and pleasant, states she feels well.   Denies any current pain or other symptoms.       Patient has dementia, unable to manage independently in the community, unsafe living conditions there and she was not receiving appropriate medical care.   Doing much better in [facility] and now well settled in her routine.      No behavioral concerns reported by nursing staff.    Has HTN by history, but sbps here fairly stable without anti-hypertensive tx:  BP Readings from Last 3 Encounters:   17 129/82   17 152/88   10/02/17 145/83      She has prns available but does not take any scheduled meds.      She remains ambulatory with a 4WW but with significant deformity of LE joints, flexed at knees and hips, forward stoop.   Has become a difficult transfer, per nursing staff.          Past Medical History   Diagnosis Date     HTN (hypertension)    Grade I LV diastolic dysfunction, EF 55-60% by ECHO   Dementia, Alzheimer's type  DJD   S/P femoral neck fracture , with subsequent AVN requiring ATIYA revision with hardware removal 2015      SH:  Single, retired from working in a bank.   Her brother  7 years ago leaving her without any close kin.   A neighbor Sarah is her POA.     ROS:  Negative except for above; BIMS 10/15  Wt Readings from Last 5 Encounters:   17 158 lb 12.8 oz (72 kg)   17 160 lb 3.2 oz (72.7 kg)   10/02/17 160 lb 12.8 oz (72.9 kg)   09/15/17 165 lb (74.8 kg)   17 157 lb 3.2 oz (71.3 kg)        EXAM:  NAD, alert and oriented x1  /82  Pulse 65  Temp 98  F (36.7  C)  Resp 18  Wt 158 lb 12.8 oz (72 kg)  SpO2 97%  BMI 24.87 kg/m2   Neck supple without adenopathy  Lungs with decreased BS, no rales or wheeze  Heart RRR s1s2, without murmur  Abd soft, NT, no  distention, +BS, no masses, no rebound or guarding.     Ext trace ankle edema L>R, valgus deformity of left knee, and chronic hip flexion  Neuro: no tremor or stiffness, +wordfinding difficulty.    Last Basic Metabolic Panel:  Lab Results   Component Value Date     10/03/2017      Lab Results   Component Value Date    POTASSIUM 4.1 10/03/2017     Lab Results   Component Value Date    CHLORIDE 105 10/03/2017     Lab Results   Component Value Date    JANELLE 9.9 10/03/2017     Lab Results   Component Value Date    CO2 26 10/03/2017     Lab Results   Component Value Date    BUN 13 10/03/2017     Lab Results   Component Value Date    CR 0.84 10/03/2017     Lab Results   Component Value Date    GLC 66 10/03/2017     Lab Results   Component Value Date    WBC 8.3 10/03/2017     Component Value Date    HGB 17.2 10/03/2017     Component Value Date    MCV 97 10/03/2017     Component Value Date     10/03/2017          IMP/PLAN:  (D45) Primary polycythemia (H)  Comment: no clear cause documented, no h/o lung disease or hypoxia  Plan: She is not a candidate for workup; would add daily aspirin 81 mg empirically.      (M15.0) Primary osteoarthritis involving multiple joints   Comment: significant deforming changes in LE joints;  s/p removal of hardware from previous ORIF, and ATIYA placment   Difficult transfer.      Plan: Ambulation with walker only.  Local measures and prn acetaminophen should she c/o pain.         (G30.1,  F02.80) Late onset Alzheimer's disease without behavioral disturbance  Comment:  gradual decline over past few years, now with loss of language and functional status  Plan: Board and Care for assist with meals, meds, cares, activity.        Advanced directives: DNR/DNI.   Friend Sarah Irving is first contact.      Nabila Velasquez MD

## 2018-02-02 ENCOUNTER — NURSING HOME VISIT (OUTPATIENT)
Dept: GERIATRICS | Facility: CLINIC | Age: 83
End: 2018-02-02
Payer: COMMERCIAL

## 2018-02-02 VITALS
RESPIRATION RATE: 18 BRPM | BODY MASS INDEX: 24.68 KG/M2 | TEMPERATURE: 98.8 F | WEIGHT: 157.6 LBS | DIASTOLIC BLOOD PRESSURE: 84 MMHG | HEART RATE: 92 BPM | OXYGEN SATURATION: 96 % | SYSTOLIC BLOOD PRESSURE: 146 MMHG

## 2018-02-02 DIAGNOSIS — M15.0 PRIMARY OSTEOARTHRITIS INVOLVING MULTIPLE JOINTS: ICD-10-CM

## 2018-02-02 DIAGNOSIS — F02.80 LATE ONSET ALZHEIMER'S DISEASE WITHOUT BEHAVIORAL DISTURBANCE (H): ICD-10-CM

## 2018-02-02 DIAGNOSIS — D45 PRIMARY POLYCYTHEMIA (H): Primary | ICD-10-CM

## 2018-02-02 DIAGNOSIS — G30.1 LATE ONSET ALZHEIMER'S DISEASE WITHOUT BEHAVIORAL DISTURBANCE (H): ICD-10-CM

## 2018-02-02 DIAGNOSIS — I10 ESSENTIAL HYPERTENSION: ICD-10-CM

## 2018-02-02 PROCEDURE — 99310 SBSQ NF CARE HIGH MDM 45: CPT | Performed by: NURSE PRACTITIONER

## 2018-02-02 RX ORDER — OSELTAMIVIR PHOSPHATE 75 MG/1
75 CAPSULE ORAL DAILY
COMMUNITY
End: 2018-04-27

## 2018-02-02 NOTE — PROGRESS NOTES
[facility]    Chief Complaint   Patient presents with     FCI Regulatory       HPI:    Lesvia Barney is a 89 year old  (10/29/1928), who is being seen today for a federally mandated E/M visit at [facility].  HPI information obtained from: facility chart records, facility staff and patient report.     Today's concerns are:  Primary polycythemia (H)  No clear cause documented. No documented history of lung disease or hypoxia. O2 sats >93%.     Essential hypertension  Per chart review, blood pressure range 103-154/65-94. Currently not taking anti-hypertensive agents. Resident denies CP, SOB, or lightheadedness. No recent falls.     Late onset Alzheimer's disease without behavioral disturbance  BIMS 08/15. No recent behaviors noted by staff. Has adjusted well to [facility] care unit. Weight stable 155 +/- 5 lbs.     Primary osteoarthritis involving multiple joints  Denies pain upon exam. Taking acetaminophen 650 mg q4 hours PRN and Aspercreme TID/daily.       ALLERGIES: No known allergies  PAST MEDICAL HISTORY:  has a past medical history of HTN (hypertension). She also has no past medical history of Chronic infection; Malignant hyperthermia; or Sleep apnea.  PAST SURGICAL HISTORY:  has a past surgical history that includes appendectomy; Open reduction internal fixation hip nailing (1/12/2014); Open reduction internal fixation hip (unk, prior to 2014); Arthroplasty hip (Right, 8/31/2015); and Remove hardware hip nailing (Right, 8/31/2015).  FAMILY HISTORY: family history includes C.A.D. in her father; Prostate Cancer in her brother.  SOCIAL HISTORY:  reports that she has never smoked. She does not have any smokeless tobacco history on file. She reports that she drinks alcohol. She reports that she does not use illicit drugs.    MEDICATIONS:  Current Outpatient Prescriptions   Medication Sig Dispense Refill     cholecalciferol 1000 UNITS TABS Take 1 tablet by mouth  daily for prophylaxis until 03/31/2018       oseltamivir (TAMIFLU) 75 MG capsule Take 75 mg by mouth daily for prophylaxis for 14 Days       AMOXICILLIN PO Give 2 gram by mouth as needed for Prophylaxis Dental Appointment       senna-docusate (SENOKOT-S;PERICOLACE) 8.6-50 MG per tablet Take 1 tablet by mouth daily as needed for constipation       trolamine salicylate (ASPERCREME) 10 % cream Apply topically 3 times daily as needed Apply to left knee        Acetaminophen (TYLENOL PO) Take 650 mg by mouth every 4 hours as needed for mild pain or fever       ORDER FOR DME Equipment being ordered: Walker Wheels () and Walker ()  Treatment Diagnosis: Impaired functional mobility tolerance and safety concerns with gait and transfer skills. 1 each 0     multivitamin, therapeutic with minerals (THERA-VIT-M) TABS Take 1 tablet by mouth daily       Medications reviewed:  Medications reconciled to facility chart and changes were made to reflect current medications as identified as above med list. Below are the changes that were made:   Medications stopped since last EPIC medication reconciliation:   There are no discontinued medications.    Medications started since last [facility] medication reconciliation:  Orders Placed This Encounter   Medications     cholecalciferol 1000 UNITS TABS     Sig: Take 1 tablet by mouth daily for prophylaxis until 03/31/2018     oseltamivir (TAMIFLU) 75 MG capsule     Sig: Take 75 mg by mouth daily for prophylaxis for 14 Days     Case Management:  I have reviewed the care plan and MDS and do agree with the plan. Patient's desire to return to the community is not present.  Information reviewed:  Medications, vital signs, orders, and nursing notes.    ROS:  Unobtainable secondary to cognitive impairment or aphasia.    Exam:  Vitals: /84  Pulse 92  Temp 98.8  F (37.1  C)  Resp 18  Wt 157 lb 9.6 oz (71.5 kg)  SpO2 96%  BMI 24.68 kg/m2  BMI= Body mass index is 24.68 kg/(m^2).  GENERAL  APPEARANCE:  Alert, in no distress  ENT:  Mouth and posterior oropharynx normal, moist mucous membranes, [place]  RESP:  respiratory effort and palpation of chest normal, lungs clear to auscultation , no respiratory distress  CV:  Palpation and auscultation of heart done , regular rate and rhythm, no murmur, rub, or gallop  M/S:   Gait and station normal  Digits and nails normal  SKIN:  Inspection of skin and subcutaneous tissue baseline, Palpation of skin and subcutaneous tissue baseline     BP Readin/70, 154/69, 117/72        HR:  71-92    Lab/Diagnostic data:   CBC RESULTS:   Recent Labs   Lab Test 10/03/17 03/01/17   WBC  8.3  9.7   RBC  5.57*  5.60*   HGB  17.2*  16.8*   HCT  53.8*  53.7*   MCV  97  96   MCH  30.9  30.0   MCHC  32.0  31.3*   RDW  13.8  14.3   PLT  278  320       Last Basic Metabolic Panel:  Recent Labs   Lab Test 10/03/17 03/01/17   NA  142  141   POTASSIUM  4.1  4.3   CHLORIDE  105  106   JANELLE  9.9  10.1   CO2  26  26   BUN  13  11   CR  0.84  0.90   GLC  66*  98       Liver Function Studies -   Recent Labs   Lab Test  14   1455   PROTTOTAL  6.9   ALBUMIN  3.8   BILITOTAL  0.5   ALKPHOS  63   AST  26   ALT  33         ASSESSMENT/PLAN  Primary polycythemia (H)  No clear cause documented. Resident not a candidate for workup.   -Add 81 mg/day     Essential hypertension  Chronic, blood pressure stable off medication. Keep SBP> 130 mmHg and DBP > 65 mmHg (levels below these increase mortality as shown by standard studies and observations).   -BMP on 18    Late onset Alzheimer's disease without behavioral disturbance  Chronic, progressive. Needs 24 hour skilled nursing care. HPI/ROS difficult to obtain with cognitive impairment. Expect further functional and cognitive decline. Expect weight loss. Continue 24 hour care. Monitor weight. Monitor functional status. Monitor for behavioral disturbances.    Primary osteoarthritis involving multiple joints  Pain managed on current medications.  Ambulation with walker.   -Continue vitamin D 1000 mg/day  -Would add Calcium 1250 mg/day    Orders:  -CBC, BMP, TSH, A1c on 2/13/18    Total time spent with patient visit at the skilled nursing facility was 35 min including patient visit and review of past records. Greater than 50% of total time spent with counseling and coordinating care due to reg visit    Electronically signed by:  GI Ash CNP

## 2018-02-10 RX ORDER — ASPIRIN 81 MG/1
81 TABLET, CHEWABLE ORAL DAILY
Qty: 108 TABLET | Refills: 3
Start: 2018-02-10 | End: 2021-01-01 | Stop reason: SINTOL

## 2018-02-10 RX ORDER — CALCIUM CARBONATE 500(1250)
1 TABLET ORAL DAILY
Qty: 180 TABLET | Refills: 3
Start: 2018-02-10 | End: 2018-04-27

## 2018-03-20 ENCOUNTER — CLINICAL UPDATE (OUTPATIENT)
Dept: PHARMACY | Facility: CLINIC | Age: 83
End: 2018-03-20

## 2018-03-20 NOTE — PROGRESS NOTES
This patient's medication list and chart were reviewed as part of the service provided by [facility].    Assessment/Recommendations:  1. (Supplements):  If eating most of meals, consider d'c of MVI.      Meena Lerner, Pharm.D.,[facility]  Board Certified Geriatric Pharmacist  Medication Therapy Management Pharmacist  [phone]

## 2018-04-27 ENCOUNTER — NURSING HOME VISIT (OUTPATIENT)
Dept: GERIATRICS | Facility: CLINIC | Age: 83
End: 2018-04-27
Payer: COMMERCIAL

## 2018-04-27 VITALS
HEART RATE: 59 BPM | SYSTOLIC BLOOD PRESSURE: 123 MMHG | OXYGEN SATURATION: 95 % | DIASTOLIC BLOOD PRESSURE: 74 MMHG | BODY MASS INDEX: 24.93 KG/M2 | TEMPERATURE: 98.3 F | RESPIRATION RATE: 18 BRPM | WEIGHT: 159.2 LBS

## 2018-04-27 DIAGNOSIS — M15.0 PRIMARY OSTEOARTHRITIS INVOLVING MULTIPLE JOINTS: Primary | ICD-10-CM

## 2018-04-27 DIAGNOSIS — G30.1 LATE ONSET ALZHEIMER'S DISEASE WITHOUT BEHAVIORAL DISTURBANCE (H): ICD-10-CM

## 2018-04-27 DIAGNOSIS — D45 PRIMARY POLYCYTHEMIA (H): ICD-10-CM

## 2018-04-27 DIAGNOSIS — F02.80 LATE ONSET ALZHEIMER'S DISEASE WITHOUT BEHAVIORAL DISTURBANCE (H): ICD-10-CM

## 2018-04-27 PROCEDURE — 99308 SBSQ NF CARE LOW MDM 20: CPT | Performed by: INTERNAL MEDICINE

## 2018-05-04 PROBLEM — D45: Status: ACTIVE | Noted: 2018-05-04

## 2018-05-05 NOTE — PROGRESS NOTES
Lesvia Barney is a 89 year old female seen 2018 at [facility] where she has resided for 2 and a half years (admit 2015) seen for routine visit.    Patient is seen in her room, alert and pleasant, states she feels well.   Denies any current pain or other symptoms.     Patient has dementia, unable to manage independently in the community, unsafe living conditions there and she was not receiving appropriate medical care.   Doing much better in [facility] and now well settled in her routine.      No behavioral concerns reported by nursing staff.    Has HTN by history, but sbps here fairly stable without anti-hypertensive tx:  BP Readings from Last 3 Encounters:   18 123/74   18 146/84   17 129/82       She remains ambulatory with a 4WW but with significant deformity of LE joints, flexed at knees and hips, forward stoop.   Has become a difficult transfer, per nursing staff.          Past Medical History   Diagnosis Date     HTN (hypertension)    Grade I LV diastolic dysfunction, EF 55-60% by ECHO   Dementia, Alzheimer's type  DJD   S/P femoral neck fracture , with subsequent AVN requiring ATIYA revision with hardware removal 2015      SH:  Single, retired from working in a bank.   Her brother  7 years ago leaving her without any close kin.   A neighbor Saarh is her POA.     ROS:  Negative except for above; BIMS 10/15  Wt Readings from Last 5 Encounters:   18 159 lb 3.2 oz (72.2 kg)   18 157 lb 9.6 oz (71.5 kg)   17 158 lb 12.8 oz (72 kg)   17 160 lb 3.2 oz (72.7 kg)   10/02/17 160 lb 12.8 oz (72.9 kg)        EXAM:  NAD, alert and oriented x1  /74  Pulse 59  Temp 98.3  F (36.8  C)  Resp 18  Wt 159 lb 3.2 oz (72.2 kg)  SpO2 95%  BMI 24.93 kg/m2   Neck supple without adenopathy  Lungs with decreased BS, no rales or wheeze  Heart RRR s1s2, without murmur  Abd soft, NT, no distention, +BS, no masses, no rebound or guarding.      Ext trace ankle edema L>R, valgus deformity of left knee, and chronic hip flexion  Neuro: no tremor or stiffness, +wordfinding difficulty.  Psych: affect okay.     Labs reviewed.       IMP/PLAN:  (D45) Primary polycythemia (H)  Comment: hgb 17.2; no clear cause documented, no h/o lung disease or hypoxia  Plan: She is not a candidate for workup; continue daily aspirin 81 mg empirically.      (M15.0) Primary osteoarthritis involving multiple joints   Comment: significant deforming changes in LE joints;  s/p removal of hardware from previous ORIF, and ATIYA placement   Plan: Assist with transfers.   Ambulation with walker  Local measures and prn acetaminophen should she c/o pain.         (G30.1,  F02.80) Late onset Alzheimer's disease without behavioral disturbance  Comment:  gradual decline over past few years, now with loss of language and functional status  Plan: Board and Care for assist with meals, meds, cares, activity.      She is eating well, can d/c MVI    Advanced directives: DNR/DNI.   Friend Sarah Irving is first contact.      Nabila Velasquez MD

## 2018-06-01 ENCOUNTER — NURSING HOME VISIT (OUTPATIENT)
Dept: GERIATRICS | Facility: CLINIC | Age: 83
End: 2018-06-01
Payer: COMMERCIAL

## 2018-06-01 VITALS
WEIGHT: 158.1 LBS | TEMPERATURE: 97.4 F | OXYGEN SATURATION: 97 % | DIASTOLIC BLOOD PRESSURE: 70 MMHG | RESPIRATION RATE: 16 BRPM | HEART RATE: 70 BPM | SYSTOLIC BLOOD PRESSURE: 128 MMHG | BODY MASS INDEX: 24.76 KG/M2

## 2018-06-01 DIAGNOSIS — I10 ESSENTIAL HYPERTENSION: ICD-10-CM

## 2018-06-01 DIAGNOSIS — G30.1 LATE ONSET ALZHEIMER'S DISEASE WITHOUT BEHAVIORAL DISTURBANCE (H): Primary | ICD-10-CM

## 2018-06-01 DIAGNOSIS — M15.0 PRIMARY OSTEOARTHRITIS INVOLVING MULTIPLE JOINTS: ICD-10-CM

## 2018-06-01 DIAGNOSIS — D45 PRIMARY POLYCYTHEMIA (H): ICD-10-CM

## 2018-06-01 DIAGNOSIS — F02.80 LATE ONSET ALZHEIMER'S DISEASE WITHOUT BEHAVIORAL DISTURBANCE (H): Primary | ICD-10-CM

## 2018-06-01 PROCEDURE — 99309 SBSQ NF CARE MODERATE MDM 30: CPT | Performed by: NURSE PRACTITIONER

## 2018-06-01 NOTE — PROGRESS NOTES
[facility]    Chief Complaint   Patient presents with     snf Regulatory       [facility] Medical Record Number:  [id number]    HPI:    Lesvia Barney is a 89 year old  (10/29/1928), who is being seen today for a federally mandated E/M visit at [facility].  HPI information obtained from: facility chart records, facility staff and patient report.     Today's concerns are:  Late onset Alzheimer's disease without behavioral disturbance  BIMS 08/15. No behaviors or mood changes noted.     Essential hypertension  Per facility chart review, all blood pressures <150/90. Denies CP, SOB or lightheadedness. Currently not requiring antihypertensive agents.     Primary osteoarthritis involving multiple joints  Denies pain upon today's exam. Has acetaminophen PRN and asperceme available for pain.     Primary polycythemia (H)  See labs.       ALLERGIES: No known allergies  PAST MEDICAL HISTORY:  has a past medical history of HTN (hypertension). She also has no past medical history of Chronic infection; Malignant hyperthermia; or Sleep apnea.  PAST SURGICAL HISTORY:  has a past surgical history that includes appendectomy; Open reduction internal fixation hip nailing (1/12/2014); Open reduction internal fixation hip (unk, prior to 2014); Arthroplasty hip (Right, 8/31/2015); and Remove hardware hip nailing (Right, 8/31/2015).  FAMILY HISTORY: family history includes C.A.D. in her father; Prostate Cancer in her brother.  SOCIAL HISTORY:  reports that she has never smoked. She does not have any smokeless tobacco history on file. She reports that she drinks alcohol. She reports that she does not use illicit drugs.    MEDICATIONS:  Current Outpatient Prescriptions   Medication Sig Dispense Refill     Acetaminophen (TYLENOL PO) Take 650 mg by mouth every 4 hours as needed for mild pain or fever       AMOXICILLIN PO Give 2 gram by mouth as needed for Prophylaxis Dental Appointment       aspirin  81 MG chewable tablet Take 1 tablet (81 mg) by mouth daily 108 tablet 3     ORDER FOR DME Equipment being ordered: Walker Wheels () and Walker ()  Treatment Diagnosis: Impaired functional mobility tolerance and safety concerns with gait and transfer skills. 1 each 0     senna-docusate (SENOKOT-S;PERICOLACE) 8.6-50 MG per tablet Take 1 tablet by mouth daily as needed for constipation       trolamine salicylate (ASPERCREME) 10 % cream Apply topically 3 times daily as needed Apply to left knee        Medications reviewed:  Medications reconciled to facility chart and changes were made to reflect current medications as identified as above med list. Below are the changes that were made:   Medications stopped since last EPIC medication reconciliation:   Medications Discontinued During This Encounter   Medication Reason     multivitamin, therapeutic with minerals (THERA-VIT-M) TABS Discontinued by another Health Care Provider       Medications started since last [facility] medication reconciliation:  No orders of the defined types were placed in this encounter.        Case Management:  I have reviewed the care plan and MDS and do agree with the plan. Patient's desire to return to the community is not assessible due to cognitive impairment.  Information reviewed:  Medications, vital signs, orders, and nursing notes.    ROS:  Unobtainable secondary to cognitive impairment.     Exam:  Vitals: /70  Pulse 70  Temp 97.4  F (36.3  C)  Resp 16  Wt 158 lb 1.6 oz (71.7 kg)  SpO2 97%  BMI 24.76 kg/m2  BMI= Body mass index is 24.76 kg/(m^2).  GENERAL APPEARANCE:  Alert, in no distress  ENT:  Mouth and posterior oropharynx normal, moist mucous membranes, normal hearing acuity  RESP:  respiratory effort and palpation of chest normal, lungs clear to auscultation , no respiratory distress  CV:  Palpation and auscultation of heart done , regular rate and rhythm, no murmur, rub, or gallop  M/S:   Gait and station  normal  Digits and nails normal  SKIN:  Inspection of skin and subcutaneous tissue baseline, Palpation of skin and subcutaneous tissue baseline  NEURO:   Cranial nerves 2-12 are normal tested and grossly at patient's baseline  PSYCH:  insight and judgement impaired, memory impaired , affect and mood normal     BP Reading:                                 HR:  63-78  130/83  144/76  123/74    Lab/Diagnostic data:   CBC RESULTS:   Recent Labs   Lab Test 10/03/17 03/01/17   WBC  8.3  9.7   RBC  5.57*  5.60*   HGB  17.2*  16.8*   HCT  53.8*  53.7*   MCV  97  96   MCH  30.9  30.0   MCHC  32.0  31.3*   RDW  13.8  14.3   PLT  278  320       Last Basic Metabolic Panel:  Recent Labs   Lab Test 10/03/17 03/01/17   NA  142  141   POTASSIUM  4.1  4.3   CHLORIDE  105  106   JANELLE  9.9  10.1   CO2  26  26   BUN  13  11   CR  0.84  0.90   GLC  66*  98       Liver Function Studies -   Recent Labs   Lab Test  01/11/14   1455   PROTTOTAL  6.9   ALBUMIN  3.8   BILITOTAL  0.5   ALKPHOS  63   AST  26   ALT  33         ASSESSMENT/PLAN  (G30.1,  F02.80) Late onset Alzheimer's disease without behavioral disturbance  (primary encounter diagnosis)  Resides on secure dementia unit with assistance for ADLs and meals. Continue to monitor weight and behavior and notify NP with changes.     (I10) Essential hypertension  Chronic, stable off medications. Keep SBP> 130 mmHg and DBP > 65 mmHg (levels below these increase mortality as shown by standard studies and observations).     (M15.0) Primary osteoarthritis involving multiple joints  Asymptomatic at this time. Continue PRN acetaminophen and Aspercreme as ordered.     (D45) Primary polycythemia (H)  Last hemoglobin 17.2. Not appropriate for work up at this time. Follow CBC.     Orders:  No new orders at this time.       Electronically signed by:  GI Ash CNP

## 2018-08-17 ENCOUNTER — NURSING HOME VISIT (OUTPATIENT)
Dept: GERIATRICS | Facility: CLINIC | Age: 83
End: 2018-08-17
Payer: COMMERCIAL

## 2018-08-17 VITALS
HEART RATE: 76 BPM | WEIGHT: 156.3 LBS | BODY MASS INDEX: 24.53 KG/M2 | TEMPERATURE: 98 F | RESPIRATION RATE: 16 BRPM | DIASTOLIC BLOOD PRESSURE: 76 MMHG | HEIGHT: 67 IN | SYSTOLIC BLOOD PRESSURE: 132 MMHG

## 2018-08-17 DIAGNOSIS — F02.80 LATE ONSET ALZHEIMER'S DISEASE WITHOUT BEHAVIORAL DISTURBANCE (H): ICD-10-CM

## 2018-08-17 DIAGNOSIS — M15.0 PRIMARY OSTEOARTHRITIS INVOLVING MULTIPLE JOINTS: ICD-10-CM

## 2018-08-17 DIAGNOSIS — D45 PRIMARY POLYCYTHEMIA (H): Primary | ICD-10-CM

## 2018-08-17 DIAGNOSIS — G30.1 LATE ONSET ALZHEIMER'S DISEASE WITHOUT BEHAVIORAL DISTURBANCE (H): ICD-10-CM

## 2018-08-17 PROCEDURE — 99308 SBSQ NF CARE LOW MDM 20: CPT | Performed by: INTERNAL MEDICINE

## 2018-08-17 NOTE — LETTER
"    2018        RE: Lesvia Barney  [address] [address]  [facility] [address]        Lesvia Barney is a 89 year old female seen 2018 at [facility] where she has resided for 3 years (admit 2015) seen for routine visit.    Patient is seen on the unit, sitting with other residents before lunch.   A little guarded, but answers a few questions   Denies any current pain or other symptoms.   No new concerns reported by nursing staff      Patient has progressive dementia, now with loss of language and very low functional status.   Doing fairly well in [facility] setting, with a daily routine.     No behavioral concerns reported by nursing staff.      She has HTN by history, but sbps here fairly stable without anti-hypertensive tx:  BP Readings from Last 3 Encounters:   18 132/76   18 128/70   18 123/74       She remains ambulatory with a 4WW but with significant deformity of LE joints, flexed at knees and hips, forward stoop.   Has become a difficult transfer, per nursing staff.          Past Medical History   Diagnosis Date     HTN (hypertension)    Grade I LV diastolic dysfunction, EF 55-60% by ECHO   Late onset dementia, Alzheimer's type  DJD   S/P femoral neck fracture , with subsequent AVN requiring ATIYA revision with hardware removal 2015    Primary polycythemia       SH:  Single, retired from working in a bank.   Her brother  7 years ago leaving her without any close kin.   A neighbor Sarah is her POA.     ROS:  Negative except for above; BIMS 8/15  Wt Readings from Last 5 Encounters:   18 156 lb 4.8 oz (70.9 kg)   18 158 lb 1.6 oz (71.7 kg)   18 159 lb 3.2 oz (72.2 kg)   18 157 lb 9.6 oz (71.5 kg)   17 158 lb 12.8 oz (72 kg)        EXAM:  NAD, alert and oriented x1  /76  Pulse 76  Temp 98  F (36.7  C)  Resp 16  Ht 5' 7\" (1.702 m)  Wt 156 lb 4.8 oz (70.9 kg)  BMI 24.48 kg/m2   Neck supple without " adenopathy  Lungs with decreased BS, no rales or wheeze  Heart RRR s1s2, without murmur  Abd soft, NT, no distention, +BS, no masses, no rebound or guarding.     Ext trace ankle edema L>R, valgus deformity of left knee, and chronic hip flexion  Neuro: no tremor or stiffness, +wordfinding difficulty.  Psych: affect okay.     Labs reviewed.       IMP/PLAN:  (D45) Primary polycythemia (H)  Comment: hgb 17.2; no clear cause documented, no h/o lung disease or hypoxia  Plan: She is not a candidate for a workup  Continue daily aspirin 81 mg empirically.      (M15.0) Primary osteoarthritis involving multiple joints   Comment: significant deforming changes of OA in LE joints  Plan: Assist with transfers.   Ambulation with walker  Local measures and prn acetaminophen should she c/o pain.         (G30.1,  F02.80) Late onset Alzheimer's disease without behavioral disturbance  Comment:  gradual decline over past few years, now with loss of language and functional status  Plan: Board and Care for assist with meals, meds, cares, activity.        Advanced directives: DNR/DNI.   Friend Sarah Irving is first contact.      Nabila Velasquez MD       Sincerely,        Nabila Velasquez MD

## 2018-08-29 NOTE — PROGRESS NOTES
"Lesvia Barney is a 89 year old female seen 2018 at [facility] where she has resided for 3 years (admit 2015) seen for routine visit.    Patient is seen on the unit, sitting with other residents before lunch.   A little guarded, but answers a few questions   Denies any current pain or other symptoms.   No new concerns reported by nursing staff      Patient has progressive dementia, now with loss of language and very low functional status.   Doing fairly well in [facility] setting, with a daily routine.     No behavioral concerns reported by nursing staff.      She has HTN by history, but sbps here fairly stable without anti-hypertensive tx:  BP Readings from Last 3 Encounters:   18 132/76   18 128/70   18 123/74       She remains ambulatory with a 4WW but with significant deformity of LE joints, flexed at knees and hips, forward stoop.   Has become a difficult transfer, per nursing staff.          Past Medical History   Diagnosis Date     HTN (hypertension)    Grade I LV diastolic dysfunction, EF 55-60% by ECHO   Late onset dementia, Alzheimer's type  DJD   S/P femoral neck fracture , with subsequent AVN requiring ATIYA revision with hardware removal 2015    Primary polycythemia       SH:  Single, retired from working in a bank.   Her brother  7 years ago leaving her without any close kin.   A neighbor Sarah is her POA.     ROS:  Negative except for above; BIMS 8/15  Wt Readings from Last 5 Encounters:   18 156 lb 4.8 oz (70.9 kg)   18 158 lb 1.6 oz (71.7 kg)   18 159 lb 3.2 oz (72.2 kg)   18 157 lb 9.6 oz (71.5 kg)   17 158 lb 12.8 oz (72 kg)        EXAM:  NAD, alert and oriented x1  /76  Pulse 76  Temp 98  F (36.7  C)  Resp 16  Ht 5' 7\" (1.702 m)  Wt 156 lb 4.8 oz (70.9 kg)  BMI 24.48 kg/m2   Neck supple without adenopathy  Lungs with decreased BS, no rales or wheeze  Heart RRR s1s2, without murmur  Abd soft, " NT, no distention, +BS, no masses, no rebound or guarding.     Ext trace ankle edema L>R, valgus deformity of left knee, and chronic hip flexion  Neuro: no tremor or stiffness, +wordfinding difficulty.  Psych: affect okay.     Labs reviewed.       IMP/PLAN:  (D45) Primary polycythemia (H)  Comment: hgb 17.2; no clear cause documented, no h/o lung disease or hypoxia  Plan: She is not a candidate for a workup  Continue daily aspirin 81 mg empirically.      (M15.0) Primary osteoarthritis involving multiple joints   Comment: significant deforming changes of OA in LE joints  Plan: Assist with transfers.   Ambulation with walker  Local measures and prn acetaminophen should she c/o pain.         (G30.1,  F02.80) Late onset Alzheimer's disease without behavioral disturbance  Comment:  gradual decline over past few years, now with loss of language and functional status  Plan: Board and Care for assist with meals, meds, cares, activity.        Advanced directives: DNR/DNI.   Friend Sarah Irving is first contact.      Nabila Velasquez MD

## 2018-09-20 VITALS
HEIGHT: 67 IN | RESPIRATION RATE: 19 BRPM | SYSTOLIC BLOOD PRESSURE: 132 MMHG | HEART RATE: 71 BPM | BODY MASS INDEX: 25.6 KG/M2 | DIASTOLIC BLOOD PRESSURE: 80 MMHG | WEIGHT: 163.1 LBS | TEMPERATURE: 97.9 F | OXYGEN SATURATION: 99 %

## 2018-09-21 ENCOUNTER — NURSING HOME VISIT (OUTPATIENT)
Dept: GERIATRICS | Facility: CLINIC | Age: 83
End: 2018-09-21
Payer: COMMERCIAL

## 2018-09-21 DIAGNOSIS — Z00.00 ROUTINE GENERAL MEDICAL EXAMINATION AT A HEALTH CARE FACILITY: Primary | ICD-10-CM

## 2018-09-21 DIAGNOSIS — G30.1 LATE ONSET ALZHEIMER'S DISEASE WITHOUT BEHAVIORAL DISTURBANCE (H): ICD-10-CM

## 2018-09-21 DIAGNOSIS — Z13.6 SCREENING FOR HYPERTENSION: ICD-10-CM

## 2018-09-21 DIAGNOSIS — F02.80 LATE ONSET ALZHEIMER'S DISEASE WITHOUT BEHAVIORAL DISTURBANCE (H): ICD-10-CM

## 2018-09-21 DIAGNOSIS — D45 PRIMARY POLYCYTHEMIA (H): ICD-10-CM

## 2018-09-21 DIAGNOSIS — M15.0 PRIMARY OSTEOARTHRITIS INVOLVING MULTIPLE JOINTS: ICD-10-CM

## 2018-09-21 PROCEDURE — 99318 ZZC ANNUAL NURSING FAC ASSESSMNT, STABLE: CPT | Performed by: NURSE PRACTITIONER

## 2018-09-21 NOTE — LETTER
9/21/2018        RE: Lesvia Barney  [address] [address]  [facility] [address]        [facility]  Chief Complaint   Patient presents with     Annual Comprehensive Nursing Home       [place] Medical Record Number:  [id number]    HPI:    Lesvia Barney is a 89 year old  (10/29/1928), who is being seen today for an annual comprehensive visit at [facility] .  HPI information obtained from: facility chart records, facility staff and patient report.      Today's concerns are:  Primary polycythemia (H)  Unknown cause. 02 SAT:  95-99% on room air  Hemoglobin   Date Value Ref Range Status   10/03/2017 17.2 (A) 12.0 - 16.0 g/dL Final   03/01/2017 16.8 (A) 12.0 - 16.0 g/dL Final     Primary osteoarthritis involving multiple joints  Denies pain today. Currently uses a walker for ambulation.    Late onset Alzheimer's disease without behavioral disturbance  BIMS 6/15 on 7/11/18. No behaviors noted by staff. Was first visited in the hallway today on her way to breakfast and declined to go back to her room to visit as she wanted to get to the dining room. She had a wet brief and pants on and was redirectable by staff to go back to room and change prior to eating. Weight has been stable 155-163 lbs over the past year. Reports she has been sleeping well.     Screening for hypertension  Based on JNC-8 goals,  patients age of 89 year old, no presence of diabetes or CKD, and goals of care goal BP is <150/90 mm Hg. Patient is stable and continue without pharmacological invention with routine assessment. Denies chest pain, syncope or shortness of breath.     BP Readings from Last 3 Encounters:   09/20/18 132/80   08/11/18 132/76   06/01/18 128/70     Pulse Readings from Last 4 Encounters:   09/20/18 71   08/11/18 76   06/01/18 70   04/27/18 59     Wt Readings from Last 3 Encounters:   09/20/18 163 lb 1.6 oz (74 kg)   08/11/18 156 lb 4.8 oz (70.9 kg)   06/01/18 158 lb 1.6 oz (71.7 kg)     ALLERGIES: No known  allergies  PROBLEM LIST:  Patient Active Problem List   Diagnosis     Fall with injury     Femoral neck fracture (H)     HTN (hypertension)     Cognitive impairment     Avascular necrosis of bone of hip, right (H)     S/P prosthetic total arthroplasty of the hip     Late onset Alzheimer's disease without behavioral disturbance     Primary osteoarthritis involving multiple joints     Primary polycythemia (H)     PAST MEDICAL HISTORY:  has a past medical history of HTN (hypertension). She also has no past medical history of Chronic infection; Malignant hyperthermia; or Sleep apnea.  PAST SURGICAL HISTORY:  has a past surgical history that includes appendectomy; Open reduction internal fixation hip nailing (1/12/2014); Open reduction internal fixation hip (unk, prior to 2014); Arthroplasty hip (Right, 8/31/2015); and Remove hardware hip nailing (Right, 8/31/2015).  FAMILY HISTORY: family history includes C.A.D. in her father; Prostate Cancer in her brother.  SOCIAL HISTORY:  reports that she has never smoked. She does not have any smokeless tobacco history on file. She reports that she drinks alcohol. She reports that she does not use illicit drugs.  IMMUNIZATIONS:  Most Recent Immunizations   Administered Date(s) Administered     Influenza (High Dose) 3 valent vaccine 10/03/2017     Influenza (IIV3) PF 10/20/2016     Pneumo Conj 13-V (2010&after) 10/15/2015     Pneumococcal 23 valent 11/09/1999     Tdap (Adacel,Boostrix) 10/15/2015     Zoster vaccine, live 10/29/2012     Above immunizations pulled from [facility]. [facility] and facility records also reconciled. Outstanding information sent to  to update [facility].  Future immunizations needed:  yearly influenza per facility protocol and Provider working with patient, first contact, and facility to administer immunizations.  MEDICATIONS:  Current Outpatient Prescriptions   Medication Sig Dispense Refill     Acetaminophen (TYLENOL PO) Take 650 mg by  mouth every 4 hours as needed for mild pain or fever       AMOXICILLIN PO Give 2 gram by mouth as needed for Prophylaxis Dental Appointment       aspirin 81 MG chewable tablet Take 1 tablet (81 mg) by mouth daily 108 tablet 3     ORDER FOR DME Equipment being ordered: Walker Wheels () and Walker ()  Treatment Diagnosis: Impaired functional mobility tolerance and safety concerns with gait and transfer skills. 1 each 0     senna-docusate (SENOKOT-S;PERICOLACE) 8.6-50 MG per tablet Take 1 tablet by mouth daily as needed for constipation       trolamine salicylate (ASPERCREME) 10 % cream Apply topically 3 times daily as needed Apply to left knee        Medications reviewed:  Medications reconciled to facility chart and changes were made to reflect current medications as identified as above med list. Below are the changes that were made:   Medications stopped since last EPIC medication reconciliation:   There are no discontinued medications.    Medications started since last [facility] medication reconciliation:  No orders of the defined types were placed in this encounter.    Case Management:  I have reviewed the facility/SNF care plan/MDS which was done 7/11/18, including the falls risk, nutrition and pain screening. I also reviewed the current immunizations, and preventive care..Future cancer screening is not clinically indicated secondary to age/goals of care Patient's desire to return to the community is not assessible due to cognitive impairment. Current Level of Care is appropriate.    Advance Directive Discussion:    I reviewed the current advanced directives as reflected in EPIC, the POLST and the facility chart, and verified the congruency of orders. I contacted the first party Sarah  and left a message regarding the plan of Care.  I did not due to cognitive impairment review the advance directives with the resident.     Team Discussion:  I communicated with the appropriate disciplines involved with the  "Plan of Care:   Nursing      Patient Goal:  Patient's goal is pain control and comfort.    Information reviewed:  Medications, vital signs, orders, and nursing notes.    ROS:  4 point ROS including Respiratory, CV, GI and , other than that noted in the HPI,  is negative    Exam:  /80  Pulse 71  Temp 97.9  F (36.6  C)  Resp 19  Ht 5' 7\" (1.702 m)  Wt 163 lb 1.6 oz (74 kg)  SpO2 99%  BMI 25.55 kg/m2  GENERAL APPEARANCE:  Alert, in no distress, pleasant, cooperative.  EYES:  EOM, lids, pupils and irises normal, sclera clear and conjunctiva normal, no discharge or mattering on lids or lashes noted  ENT:  Mouth normal, moist mucous membranes, nose without drainage or crusting, external ears without lesions, hearing acuity intact  NECK: supple, symmetrical  RESP:  respiratory effort and palpation of chest normal, no chest wall tenderness, no respiratory distress, Lung sounds clear, patient is on room air  CV:  Palpation and auscultation of heart done, rate and rhythm regular, no murmur, no rub or gallop. Edema none in bilateral lower extremities.   ABDOMEN:  normal bowel sounds, soft, nontender.  M/S:   Gait and station ambulates independently with walker, no tenderness or swelling of the joints; able to move all extremities   SKIN:  Inspection and palpation of skin and subcutaneous tissue: skin warm, dry and intact without rashes  NEURO: cranial nerves 2-12 grossly intact, no facial asymmetry, no speech deficits and able to follow directions, moves all extremities symmetrically  PSYCH:  insight and judgement impaired, memory impaired, affect and mood normal      Lab/Diagnostic data:   CBC RESULTS:   Recent Labs   Lab Test 10/03/17 03/01/17   WBC  8.3  9.7   RBC  5.57*  5.60*   HGB  17.2*  16.8*   HCT  53.8*  53.7*   MCV  97  96   MCH  30.9  30.0   MCHC  32.0  31.3*   RDW  13.8  14.3   PLT  278  320       Last Basic Metabolic Panel:  Recent Labs   Lab Test 10/03/17 03/01/17   NA  142  141   POTASSIUM  4.1  " 4.3   CHLORIDE  105  106   JANELLE  9.9  10.1   CO2  26  26   BUN  13  11   CR  0.84  0.90   GLC  66*  98       Liver Function Studies -   Recent Labs   Lab Test  01/11/14   1455   PROTTOTAL  6.9   ALBUMIN  3.8   BILITOTAL  0.5   ALKPHOS  63   AST  26   ALT  33       ASSESSMENT/PLAN  (D45) Primary polycythemia (H)  Comment: unknown cause, per chart review is not a candidate for work up. HGB 17.2 one year ago.   Plan: CBC on next lab day. Continue with asa 81 mg daily.    (M15.0) Primary osteoarthritis involving multiple joints  Comment: no pain today. Ambulating well.    Plan: continue tylenol PRN for pain.     (G30.1,  F02.80) Late onset Alzheimer's disease without behavioral disturbance  Comment: chronic, progressive. Weights have been stable and she does not have sleep disturbance. Suspect that she may have weight loss as the dementia progresses.   Plan: Board and care for assistance with meds and cares.     (Z13.6) Screening for hypertension  Comment: Based on JNC-8 goals,  patients age of 89 year old, no presence of diabetes or CKD, and goals of care goal BP is <150/90 mm Hg. Patient is stable and continue without pharmacological invention with routine assessment..      Orders:  BMP and CBC on next lab day    Electronically signed by:  GI Vilchis CNP      Sincerely,        GI Leigh CNP

## 2018-09-21 NOTE — PROGRESS NOTES
[facility]  Chief Complaint   Patient presents with     Annual Comprehensive Nursing Home       [place] Medical Record Number:  [id number]    HPI:    Lesvia Barney is a 89 year old  (10/29/1928), who is being seen today for an annual comprehensive visit at [facility] .  HPI information obtained from: facility chart records, facility staff and patient report.      Today's concerns are:  Primary polycythemia (H)  Unknown cause. 02 SAT:  95-99% on room air  Hemoglobin   Date Value Ref Range Status   10/03/2017 17.2 (A) 12.0 - 16.0 g/dL Final   03/01/2017 16.8 (A) 12.0 - 16.0 g/dL Final     Primary osteoarthritis involving multiple joints  Denies pain today. Currently uses a walker for ambulation.    Late onset Alzheimer's disease without behavioral disturbance  BIMS 6/15 on 7/11/18. No behaviors noted by staff. Was first visited in the hallway today on her way to breakfast and declined to go back to her room to visit as she wanted to get to the dining room. She had a wet brief and pants on and was redirectable by staff to go back to room and change prior to eating. Weight has been stable 155-163 lbs over the past year. Reports she has been sleeping well.     Screening for hypertension  Based on JNC-8 goals,  patients age of 89 year old, no presence of diabetes or CKD, and goals of care goal BP is <150/90 mm Hg. Patient is stable and continue without pharmacological invention with routine assessment. Denies chest pain, syncope or shortness of breath.     BP Readings from Last 3 Encounters:   09/20/18 132/80   08/11/18 132/76   06/01/18 128/70     Pulse Readings from Last 4 Encounters:   09/20/18 71   08/11/18 76   06/01/18 70   04/27/18 59     Wt Readings from Last 3 Encounters:   09/20/18 163 lb 1.6 oz (74 kg)   08/11/18 156 lb 4.8 oz (70.9 kg)   06/01/18 158 lb 1.6 oz (71.7 kg)     ALLERGIES: No known allergies  PROBLEM LIST:  Patient Active Problem List   Diagnosis     Fall with injury      Femoral neck fracture (H)     HTN (hypertension)     Cognitive impairment     Avascular necrosis of bone of hip, right (H)     S/P prosthetic total arthroplasty of the hip     Late onset Alzheimer's disease without behavioral disturbance     Primary osteoarthritis involving multiple joints     Primary polycythemia (H)     PAST MEDICAL HISTORY:  has a past medical history of HTN (hypertension). She also has no past medical history of Chronic infection; Malignant hyperthermia; or Sleep apnea.  PAST SURGICAL HISTORY:  has a past surgical history that includes appendectomy; Open reduction internal fixation hip nailing (1/12/2014); Open reduction internal fixation hip (unk, prior to 2014); Arthroplasty hip (Right, 8/31/2015); and Remove hardware hip nailing (Right, 8/31/2015).  FAMILY HISTORY: family history includes C.A.D. in her father; Prostate Cancer in her brother.  SOCIAL HISTORY:  reports that she has never smoked. She does not have any smokeless tobacco history on file. She reports that she drinks alcohol. She reports that she does not use illicit drugs.  IMMUNIZATIONS:  Most Recent Immunizations   Administered Date(s) Administered     Influenza (High Dose) 3 valent vaccine 10/03/2017     Influenza (IIV3) PF 10/20/2016     Pneumo Conj 13-V (2010&after) 10/15/2015     Pneumococcal 23 valent 11/09/1999     Tdap (Adacel,Boostrix) 10/15/2015     Zoster vaccine, live 10/29/2012     Above immunizations pulled from [facility]. [facility] and facility records also reconciled. Outstanding information sent to  to update [facility].  Future immunizations needed:  yearly influenza per facility protocol and Provider working with patient, first contact, and facility to administer immunizations.  MEDICATIONS:  Current Outpatient Prescriptions   Medication Sig Dispense Refill     Acetaminophen (TYLENOL PO) Take 650 mg by mouth every 4 hours as needed for mild pain or fever       AMOXICILLIN PO Give 2 gram by  mouth as needed for Prophylaxis Dental Appointment       aspirin 81 MG chewable tablet Take 1 tablet (81 mg) by mouth daily 108 tablet 3     ORDER FOR DME Equipment being ordered: Walker Wheels () and Walker ()  Treatment Diagnosis: Impaired functional mobility tolerance and safety concerns with gait and transfer skills. 1 each 0     senna-docusate (SENOKOT-S;PERICOLACE) 8.6-50 MG per tablet Take 1 tablet by mouth daily as needed for constipation       trolamine salicylate (ASPERCREME) 10 % cream Apply topically 3 times daily as needed Apply to left knee        Medications reviewed:  Medications reconciled to facility chart and changes were made to reflect current medications as identified as above med list. Below are the changes that were made:   Medications stopped since last EPIC medication reconciliation:   There are no discontinued medications.    Medications started since last [facility] medication reconciliation:  No orders of the defined types were placed in this encounter.    Case Management:  I have reviewed the facility/SNF care plan/MDS which was done 7/11/18, including the falls risk, nutrition and pain screening. I also reviewed the current immunizations, and preventive care..Future cancer screening is not clinically indicated secondary to age/goals of care Patient's desire to return to the community is not assessible due to cognitive impairment. Current Level of Care is appropriate.    Advance Directive Discussion:    I reviewed the current advanced directives as reflected in EPIC, the POLST and the facility chart, and verified the congruency of orders. I contacted the first party Sarah  and left a message regarding the plan of Care.  I did not due to cognitive impairment review the advance directives with the resident.     Team Discussion:  I communicated with the appropriate disciplines involved with the Plan of Care:   Nursing      Patient Goal:  Patient's goal is pain control and  "comfort.    Information reviewed:  Medications, vital signs, orders, and nursing notes.    ROS:  4 point ROS including Respiratory, CV, GI and , other than that noted in the HPI,  is negative    Exam:  /80  Pulse 71  Temp 97.9  F (36.6  C)  Resp 19  Ht 5' 7\" (1.702 m)  Wt 163 lb 1.6 oz (74 kg)  SpO2 99%  BMI 25.55 kg/m2  GENERAL APPEARANCE:  Alert, in no distress, pleasant, cooperative.  EYES:  EOM, lids, pupils and irises normal, sclera clear and conjunctiva normal, no discharge or mattering on lids or lashes noted  ENT:  Mouth normal, moist mucous membranes, nose without drainage or crusting, external ears without lesions, hearing acuity intact  NECK: supple, symmetrical  RESP:  respiratory effort and palpation of chest normal, no chest wall tenderness, no respiratory distress, Lung sounds clear, patient is on room air  CV:  Palpation and auscultation of heart done, rate and rhythm regular, no murmur, no rub or gallop. Edema none in bilateral lower extremities.   ABDOMEN:  normal bowel sounds, soft, nontender.  M/S:   Gait and station ambulates independently with walker, no tenderness or swelling of the joints; able to move all extremities   SKIN:  Inspection and palpation of skin and subcutaneous tissue: skin warm, dry and intact without rashes  NEURO: cranial nerves 2-12 grossly intact, no facial asymmetry, no speech deficits and able to follow directions, moves all extremities symmetrically  PSYCH:  insight and judgement impaired, memory impaired, affect and mood normal      Lab/Diagnostic data:   CBC RESULTS:   Recent Labs   Lab Test 10/03/17 03/01/17   WBC  8.3  9.7   RBC  5.57*  5.60*   HGB  17.2*  16.8*   HCT  53.8*  53.7*   MCV  97  96   MCH  30.9  30.0   MCHC  32.0  31.3*   RDW  13.8  14.3   PLT  278  320       Last Basic Metabolic Panel:  Recent Labs   Lab Test 10/03/17 03/01/17   NA  142  141   POTASSIUM  4.1  4.3   CHLORIDE  105  106   JANELLE  9.9  10.1   CO2  26  26   BUN  13  11   CR  " 0.84  0.90   GLC  66*  98       Liver Function Studies -   Recent Labs   Lab Test  01/11/14   1455   PROTTOTAL  6.9   ALBUMIN  3.8   BILITOTAL  0.5   ALKPHOS  63   AST  26   ALT  33       ASSESSMENT/PLAN  (D45) Primary polycythemia (H)  Comment: unknown cause, per chart review is not a candidate for work up. HGB 17.2 one year ago.   Plan: CBC on next lab day. Continue with asa 81 mg daily.    (M15.0) Primary osteoarthritis involving multiple joints  Comment: no pain today. Ambulating well.    Plan: continue tylenol PRN for pain.     (G30.1,  F02.80) Late onset Alzheimer's disease without behavioral disturbance  Comment: chronic, progressive. Weights have been stable and she does not have sleep disturbance. Suspect that she may have weight loss as the dementia progresses.   Plan: Board and care for assistance with meds and cares.     (Z13.6) Screening for hypertension  Comment: Based on JNC-8 goals,  patients age of 89 year old, no presence of diabetes or CKD, and goals of care goal BP is <150/90 mm Hg. Patient is stable and continue without pharmacological invention with routine assessment..      Orders:  BMP and CBC on next lab day    Electronically signed by:  GI Vilchis CNP

## 2018-09-25 ENCOUNTER — RECORDS - HEALTHEAST (OUTPATIENT)
Dept: LAB | Facility: CLINIC | Age: 83
End: 2018-09-25

## 2018-09-25 ENCOUNTER — TRANSFERRED RECORDS (OUTPATIENT)
Dept: HEALTH INFORMATION MANAGEMENT | Facility: CLINIC | Age: 83
End: 2018-09-25

## 2018-09-25 LAB
ANION GAP SERPL CALCULATED.3IONS-SCNC: 9 MMOL/L (ref 5–18)
ANION GAP SERPL CALCULATED.3IONS-SCNC: 9 MMOL/L (ref 5–18)
BASOPHILS # BLD AUTO: 0 THOU/UL (ref 0–0.2)
BASOPHILS NFR BLD AUTO: 0 % (ref 0–2)
BUN SERPL-MCNC: 15 MG/DL (ref 8–28)
BUN SERPL-MCNC: 15 MG/DL (ref 8–28)
CALCIUM SERPL-MCNC: 9.8 MG/DL (ref 8.5–10.5)
CALCIUM SERPL-MCNC: 9.8 MG/DL (ref 8.5–10.5)
CHLORIDE BLD-SCNC: 104 MMOL/L (ref 98–107)
CHLORIDE SERPLBLD-SCNC: 104 MMOL/L (ref 98–107)
CO2 SERPL-SCNC: 26 MMOL/L (ref 22–31)
CO2 SERPL-SCNC: 26 MMOL/L (ref 22–31)
CREAT SERPL-MCNC: 0.8 MG/DL (ref 0.6–1.1)
CREAT SERPL-MCNC: 0.8 MG/DL (ref 0.6–1.1)
DIFFERENTIAL: ABNORMAL
EOSINOPHIL # BLD AUTO: 0.1 THOU/UL (ref 0–0.4)
EOSINOPHIL NFR BLD AUTO: 2 % (ref 0–6)
ERYTHROCYTE [DISTWIDTH] IN BLOOD BY AUTOMATED COUNT: 14 % (ref 11–14.5)
ERYTHROCYTE [DISTWIDTH] IN BLOOD BY AUTOMATED COUNT: 14 % (ref 11–14.5)
GFR SERPL CREATININE-BSD FRML MDRD: >60 ML/MIN/1.73M2
GFR SERPL CREATININE-BSD FRML MDRD: >60 ML/MIN/1.73M2
GLUCOSE BLD-MCNC: 76 MG/DL (ref 70–125)
GLUCOSE SERPL-MCNC: 76 MG/DL (ref 70–125)
HCT VFR BLD AUTO: 49.8 % (ref 35–47)
HCT VFR BLD AUTO: 49.8 % (ref 35–47)
HEMOGLOBIN: 15.9 G/DL (ref 12–16)
HGB BLD-MCNC: 15.9 G/DL (ref 12–16)
LYMPHOCYTES # BLD AUTO: 1.7 THOU/UL (ref 0.8–4.4)
LYMPHOCYTES NFR BLD AUTO: 24 % (ref 20–40)
MCH RBC QN AUTO: 29.8 PG (ref 27–34)
MCH RBC QN AUTO: 29.8 PG (ref 27–34)
MCHC RBC AUTO-ENTMCNC: 31.9 G/DL (ref 32–36)
MCHC RBC AUTO-ENTMCNC: 31.9 G/DL (ref 32–36)
MCV RBC AUTO: 93 FL (ref 80–100)
MCV RBC AUTO: 93 FL (ref 80–100)
MONOCYTES # BLD AUTO: 0.6 THOU/UL (ref 0–0.9)
MONOCYTES NFR BLD AUTO: 8 % (ref 2–10)
NEUTROPHILS # BLD AUTO: 4.8 THOU/UL (ref 2–7.7)
NEUTROPHILS NFR BLD AUTO: 66 % (ref 50–70)
PLATELET # BLD AUTO: 270 THOU/UL (ref 140–440)
PLATELET # BLD AUTO: 270 THOU/UL (ref 140–440)
PMV BLD AUTO: 9.3 FL (ref 8.5–12.5)
POTASSIUM BLD-SCNC: 4.4 MMOL/L (ref 3.5–5)
POTASSIUM SERPL-SCNC: 4.4 MMOL/L (ref 3.5–5)
RBC # BLD AUTO: 5.34 MILL/UL (ref 3.8–5.4)
RBC # BLD AUTO: 5.34 MILL/UL (ref 3.8–5.4)
SODIUM SERPL-SCNC: 139 MMOL/L (ref 136–145)
SODIUM SERPL-SCNC: 139 MMOL/L (ref 136–145)
WBC # BLD AUTO: 7.3 THOU/UL (ref 4–11)
WBC: 7.3 THOU/UL (ref 4–11)

## 2018-12-13 ENCOUNTER — NURSING HOME VISIT (OUTPATIENT)
Dept: GERIATRICS | Facility: CLINIC | Age: 83
End: 2018-12-13
Payer: COMMERCIAL

## 2018-12-13 VITALS
TEMPERATURE: 98 F | RESPIRATION RATE: 20 BRPM | BODY MASS INDEX: 23.31 KG/M2 | WEIGHT: 148.5 LBS | OXYGEN SATURATION: 99 % | DIASTOLIC BLOOD PRESSURE: 74 MMHG | HEIGHT: 67 IN | HEART RATE: 68 BPM | SYSTOLIC BLOOD PRESSURE: 121 MMHG

## 2018-12-13 DIAGNOSIS — F02.80 LATE ONSET ALZHEIMER'S DISEASE WITHOUT BEHAVIORAL DISTURBANCE (H): ICD-10-CM

## 2018-12-13 DIAGNOSIS — M15.0 PRIMARY OSTEOARTHRITIS INVOLVING MULTIPLE JOINTS: Primary | ICD-10-CM

## 2018-12-13 DIAGNOSIS — G30.1 LATE ONSET ALZHEIMER'S DISEASE WITHOUT BEHAVIORAL DISTURBANCE (H): ICD-10-CM

## 2018-12-13 DIAGNOSIS — D45 PRIMARY POLYCYTHEMIA (H): ICD-10-CM

## 2018-12-13 PROCEDURE — 99308 SBSQ NF CARE LOW MDM 20: CPT | Performed by: INTERNAL MEDICINE

## 2018-12-13 ASSESSMENT — MIFFLIN-ST. JEOR: SCORE: 1126.22

## 2018-12-13 NOTE — LETTER
"    2018        RE: Lesvia Barney  [address] [address]  [facility] [address]        Lesvia Barney is a 89 year old female seen 2018 at [facility] where she has resided for 3 years (admit 2015) seen for routine visit.    Patient is seen on the unit, up to chair   She is pleasant and smiling, states she is \"doing well\"     Denies any current pain or other symptoms.     Patient has progressive dementia, now with loss of language and very low functional status.   Doing fairly well in [facility] setting, with a daily routine.     No behavioral concerns reported by nursing staff.      She has HTN by history, but sbps here fairly stable without anti-hypertensive tx:  BP Readings from Last 3 Encounters:   18 121/74   18 132/80   18 132/76       She remains ambulatory with a 4WW but with significant deformity of LE joints, flexed at knees and hips, forward stoop.   Has become a difficult transfer, per nursing staff.          Past Medical History   Diagnosis Date     HTN (hypertension)    Grade I LV diastolic dysfunction, EF 55-60% by ECHO   Late onset dementia, Alzheimer's type  DJD   S/P femoral neck fracture , with subsequent AVN requiring ATIYA revision with hardware removal 2015    Primary polycythemia       SH:  Single, retired from working in a bank.   Her brother  leaving her without any close kin.   A neighbor Sarah is her POA.     ROS:  +incontinence; BIMS 8/15  Wt Readings from Last 5 Encounters:   18 67.4 kg (148 lb 8 oz)   18 74 kg (163 lb 1.6 oz)   18 70.9 kg (156 lb 4.8 oz)   18 71.7 kg (158 lb 1.6 oz)   18 72.2 kg (159 lb 3.2 oz)        EXAM:  NAD, alert and oriented x1  /74   Pulse 68   Temp 98  F (36.7  C)   Resp 20   Ht 1.702 m (5' 7\")   Wt 67.4 kg (148 lb 8 oz)   SpO2 99%   BMI 23.26 kg/m      Neck supple without adenopathy  Lungs with decreased BS, no rales or wheeze  Heart RRR s1s2, without " murmur  Abd soft, NT, no distention, +BS, no masses, no rebound or guarding.     Ext trace ankle edema L>R, valgus deformity of left knee, and chronic hip flexion  Neuro: no tremor or stiffness, +wordfinding difficulty.  Psych: affect okay.     Last Comprehensive Metabolic Panel:  Sodium   Date Value Ref Range Status   09/25/2018 139 136 - 145 mmol/L Final     Potassium   Date Value Ref Range Status   09/25/2018 4.4 3.5 - 5.0 mmol/L Final     Chloride   Date Value Ref Range Status   09/25/2018 104 98 - 107 mmol/L Final     Carbon Dioxide   Date Value Ref Range Status   09/25/2018 26 22 - 31 mmol/L Final     Anion Gap   Date Value Ref Range Status   09/25/2018 9 5 - 18 mmol/L Final     Glucose   Date Value Ref Range Status   09/25/2018 76 70 - 125 mg/dL Final     Urea Nitrogen   Date Value Ref Range Status   09/25/2018 15 8 - 28 mg/dL Final     Creatinine   Date Value Ref Range Status   09/25/2018 0.80 0.60 - 1.10 mg/dL Final     GFR Estimate   Date Value Ref Range Status   09/25/2018 >60 >60 ml/min/1.73m2 Final     Calcium   Date Value Ref Range Status   09/25/2018 9.8 8.5 - 10.5 mg/dL Final     Lab Results   Component Value Date    WBC 7.3 09/25/2018      HGB 15.9 09/25/2018      MCV 93 09/25/2018       09/25/2018          IMP/PLAN:  (D45) Primary polycythemia (H)  Comment: hgb lower on most recent check; no clear cause documented, no h/o lung disease or hypoxia  Plan: She is not a candidate for a workup  Continue daily aspirin 81 mg empirically.      (M15.0) Primary osteoarthritis involving multiple joints   Comment: significant deforming changes of OA in LE joints  Plan: Assist with transfers.   Ambulation with walker  Local measures and prn acetaminophen should she c/o pain.         (G30.1,  F02.80) Late onset Alzheimer's disease without behavioral disturbance  Comment:  gradual decline over past few years, now with loss of language and functional status  Plan: Board and Care for assist with meals, meds,  cares, activity.        Advanced directives: DNR/DNI.   Friend Sarah Maria Fernanda is first contact.      Nabila Velasquez MD       Sincerely,        Nabila Velasquez MD

## 2018-12-23 NOTE — PROGRESS NOTES
"Lesvia Barney is a 89 year old female seen 2018 at [facility] where she has resided for 3 years (admit 2015) seen for routine visit.    Patient is seen on the unit, up to chair   She is pleasant and smiling, states she is \"doing well\"     Denies any current pain or other symptoms.     Patient has progressive dementia, now with loss of language and very low functional status.   Doing fairly well in [facility] setting, with a daily routine.     No behavioral concerns reported by nursing staff.      She has HTN by history, but sbps here fairly stable without anti-hypertensive tx:  BP Readings from Last 3 Encounters:   18 121/74   18 132/80   18 132/76       She remains ambulatory with a 4WW but with significant deformity of LE joints, flexed at knees and hips, forward stoop.   Has become a difficult transfer, per nursing staff.          Past Medical History   Diagnosis Date     HTN (hypertension)    Grade I LV diastolic dysfunction, EF 55-60% by ECHO   Late onset dementia, Alzheimer's type  DJD   S/P femoral neck fracture , with subsequent AVN requiring ATIYA revision with hardware removal 2015    Primary polycythemia       SH:  Single, retired from working in a bank.   Her brother  leaving her without any close kin.   A neighbor Sarah is her POA.     ROS:  +incontinence; BIMS 8/15  Wt Readings from Last 5 Encounters:   18 67.4 kg (148 lb 8 oz)   18 74 kg (163 lb 1.6 oz)   18 70.9 kg (156 lb 4.8 oz)   18 71.7 kg (158 lb 1.6 oz)   18 72.2 kg (159 lb 3.2 oz)        EXAM:  NAD, alert and oriented x1  /74   Pulse 68   Temp 98  F (36.7  C)   Resp 20   Ht 1.702 m (5' 7\")   Wt 67.4 kg (148 lb 8 oz)   SpO2 99%   BMI 23.26 kg/m     Neck supple without adenopathy  Lungs with decreased BS, no rales or wheeze  Heart RRR s1s2, without murmur  Abd soft, NT, no distention, +BS, no masses, no rebound or guarding.     Ext trace " ankle edema L>R, valgus deformity of left knee, and chronic hip flexion  Neuro: no tremor or stiffness, +wordfinding difficulty.  Psych: affect okay.     Last Comprehensive Metabolic Panel:  Sodium   Date Value Ref Range Status   09/25/2018 139 136 - 145 mmol/L Final     Potassium   Date Value Ref Range Status   09/25/2018 4.4 3.5 - 5.0 mmol/L Final     Chloride   Date Value Ref Range Status   09/25/2018 104 98 - 107 mmol/L Final     Carbon Dioxide   Date Value Ref Range Status   09/25/2018 26 22 - 31 mmol/L Final     Anion Gap   Date Value Ref Range Status   09/25/2018 9 5 - 18 mmol/L Final     Glucose   Date Value Ref Range Status   09/25/2018 76 70 - 125 mg/dL Final     Urea Nitrogen   Date Value Ref Range Status   09/25/2018 15 8 - 28 mg/dL Final     Creatinine   Date Value Ref Range Status   09/25/2018 0.80 0.60 - 1.10 mg/dL Final     GFR Estimate   Date Value Ref Range Status   09/25/2018 >60 >60 ml/min/1.73m2 Final     Calcium   Date Value Ref Range Status   09/25/2018 9.8 8.5 - 10.5 mg/dL Final     Lab Results   Component Value Date    WBC 7.3 09/25/2018      HGB 15.9 09/25/2018      MCV 93 09/25/2018       09/25/2018          IMP/PLAN:  (D45) Primary polycythemia (H)  Comment: hgb lower on most recent check; no clear cause documented, no h/o lung disease or hypoxia  Plan: She is not a candidate for a workup  Continue daily aspirin 81 mg empirically.      (M15.0) Primary osteoarthritis involving multiple joints   Comment: significant deforming changes of OA in LE joints  Plan: Assist with transfers.   Ambulation with walker  Local measures and prn acetaminophen should she c/o pain.         (G30.1,  F02.80) Late onset Alzheimer's disease without behavioral disturbance  Comment:  gradual decline over past few years, now with loss of language and functional status  Plan: Board and Care for assist with meals, meds, cares, activity.        Advanced directives: DNR/DNI.   Friend Sarah Irving is first  contact.      Nabila Velasquez MD

## 2019-02-13 VITALS
SYSTOLIC BLOOD PRESSURE: 134 MMHG | RESPIRATION RATE: 16 BRPM | DIASTOLIC BLOOD PRESSURE: 74 MMHG | HEIGHT: 67 IN | WEIGHT: 163.9 LBS | TEMPERATURE: 97.1 F | OXYGEN SATURATION: 99 % | HEART RATE: 71 BPM | BODY MASS INDEX: 25.72 KG/M2

## 2019-02-13 ASSESSMENT — MIFFLIN-ST. JEOR: SCORE: 1196.08

## 2019-02-14 ENCOUNTER — NURSING HOME VISIT (OUTPATIENT)
Dept: GERIATRICS | Facility: CLINIC | Age: 84
End: 2019-02-14
Payer: COMMERCIAL

## 2019-02-14 DIAGNOSIS — M15.0 PRIMARY OSTEOARTHRITIS INVOLVING MULTIPLE JOINTS: ICD-10-CM

## 2019-02-14 DIAGNOSIS — D45 PRIMARY POLYCYTHEMIA (H): Primary | ICD-10-CM

## 2019-02-14 DIAGNOSIS — I10 ESSENTIAL HYPERTENSION: ICD-10-CM

## 2019-02-14 PROCEDURE — 99309 SBSQ NF CARE MODERATE MDM 30: CPT | Performed by: NURSE PRACTITIONER

## 2019-02-14 NOTE — PROGRESS NOTES
[facility]    Chief Complaint   Patient presents with     [facility] Regulatory       [place] Medical Record Number:  [id number]  Place of Service where encounter took place:  [facility] ([facility]) [[id number]]    HPI:    Lesvia Barney is a 89 year old  (10/29/1928), who is being seen today for a federally mandated E/M visit.  HPI information obtained from: facility chart records, facility staff and patient report.     Today's concerns are:   Diagnosis Comments   1. Primary polycythemia (H)  Last hemoglobin 15.9. Remains asymptomatic.      2. Primary osteoarthritis involving multiple joints  Denies pain upon today's exam. Currently has acetaminophen available PRN and Aspercreme . Ambulatory with walker.    3. Essential hypertension    No longer requires antihypertensive agents.          BP Readin/74  146/84  145/81    HR:  68-85  Wt Readings from Last 4 Encounters:   19 74.3 kg (163 lb 14.4 oz)   18 67.4 kg (148 lb 8 oz)   18 74 kg (163 lb 1.6 oz)   18 70.9 kg (156 lb 4.8 oz)     ALLERGIES: No known allergies  PAST MEDICAL HISTORY:  has a past medical history of HTN (hypertension). She also has no past medical history of Chronic infection, Malignant hyperthermia, or Sleep apnea.  PAST SURGICAL HISTORY:  has a past surgical history that includes appendectomy; Open reduction internal fixation hip nailing (2014); Open reduction internal fixation hip (unk, prior to ); Arthroplasty hip (Right, 2015); and Remove hardware hip nailing (Right, 2015).  FAMILY HISTORY: family history includes C.A.D. in her father; Prostate Cancer in her brother.  SOCIAL HISTORY:  reports that  has never smoked. She does not have any smokeless tobacco history on file. She reports that she drinks alcohol. She reports that she does not use drugs.    MEDICATIONS:  Current Outpatient Medications   Medication Sig Dispense Refill     Acetaminophen (TYLENOL PO) Take 650  "mg by mouth every 4 hours as needed for mild pain or fever       AMOXICILLIN PO Give 2 gram by mouth as needed for Prophylaxis Dental Appointment       aspirin 81 MG chewable tablet Take 1 tablet (81 mg) by mouth daily 108 tablet 3     ORDER FOR DME Equipment being ordered: Walker Wheels () and Walker ()  Treatment Diagnosis: Impaired functional mobility tolerance and safety concerns with gait and transfer skills. 1 each 0     senna-docusate (SENOKOT-S;PERICOLACE) 8.6-50 MG per tablet Take 1 tablet by mouth daily as needed for constipation       trolamine salicylate (ASPERCREME) 10 % cream Apply topically 3 times daily as needed Apply to left knee        Medications reviewed:  Medications reconciled to facility chart and changes were made to reflect current medications as identified as above med list. Below are the changes that were made:   Medications stopped since last EPIC medication reconciliation:   There are no discontinued medications.    Medications started since last [facility] medication reconciliation:  No orders of the defined types were placed in this encounter.        Case Management:  I have reviewed the care plan and MDS and do agree with the plan. Patient's desire to return to the community is not assessible due to cognitive impairment.  Information reviewed:  Medications, vital signs, orders, and nursing notes.    ROS:  Unobtainable secondary to cognitive impairment.     Exam:  Vitals: /74   Pulse 71   Temp 97.1  F (36.2  C)   Resp 16   Ht 1.702 m (5' 7\")   Wt 74.3 kg (163 lb 14.4 oz)   SpO2 99%   BMI 25.67 kg/m    BMI= Body mass index is 25.67 kg/m .  GENERAL APPEARANCE:  Alert, in no distress  ENT:  Mouth and posterior oropharynx normal, moist mucous membranes  RESP:  respiratory effort and palpation of chest normal, lungs clear to auscultation , no respiratory distress  CV:  Palpation and auscultation of heart done , regular rate and rhythm, no murmur, rub, or gallop  M/S:   " Gait and station normal  Digits and nails normal  SKIN:  Inspection of skin and subcutaneous tissue baseline, Palpation of skin and subcutaneous tissue baseline  NEURO:   Cranial nerves 2-12 are normal tested and grossly at patient's baseline  PSYCH:  insight and judgement impaired, memory impaired , affect and mood normal    Lab/Diagnostic data:   CBC RESULTS:   Recent Labs   Lab Test 09/25/18 10/03/17   WBC 7.3 8.3   RBC 5.34 5.57*   HGB 15.9 17.2*   HCT 49.8* 53.8*   MCV 93 97   MCH 29.8 30.9   MCHC 31.9* 32.0   RDW 14.0 13.8    278       Last Basic Metabolic Panel:  Recent Labs   Lab Test 09/25/18 10/03/17    142   POTASSIUM 4.4 4.1   CHLORIDE 104 105   JANELLE 9.8 9.9   CO2 26 26   BUN 15 13   CR 0.80 0.84   GLC 76 66*       Liver Function Studies -   Recent Labs   Lab Test 01/11/14  1455   PROTTOTAL 6.9   ALBUMIN 3.8   BILITOTAL 0.5   ALKPHOS 63   AST 26   ALT 33         ASSESSMENT/PLAN  (D45) Primary polycythemia (H)  (primary encounter diagnosis)  Hemoglobin stable and resident remains asymptomatic. Follow CBC q 6 months and PRN. Continue ASA as ordered.     (M15.0) Primary osteoarthritis involving multiple joints  Longstanding history of bilateral hip pain. Pain managed on PRN acetaminophen and PRN Aspercreme. No changes at this time and adjustments as clinically.     (I10) Essential hypertension  Chronic, blood pressure stable off antihypertensive agents. Keep SBP> 130 mmHg and DBP > 65 mmHg (levels below these increase mortality as shown by standard studies and observations).       Electronically signed by:  GI Ash CNP

## 2019-02-14 NOTE — LETTER
2019        RE: Lesvia Barney  [address] [address]  [facility] [address]          [facility]    Chief Complaint   Patient presents with     [facility] Regulatory       [place] Medical Record Number:  [id number]  Place of Service where encounter took place:  [facility] ([facility]) [[id number]]    HPI:    Lesvia Banrey is a 89 year old  (10/29/1928), who is being seen today for a federally mandated E/M visit.  HPI information obtained from: facility chart records, facility staff and patient report.     Today's concerns are:   Diagnosis Comments   1. Primary polycythemia (H)  Last hemoglobin 15.9. Remains asymptomatic.      2. Primary osteoarthritis involving multiple joints  Denies pain upon today's exam. Currently has acetaminophen available PRN and Aspercreme . Ambulatory with walker.    3. Essential hypertension    No longer requires antihypertensive agents.          BP Readin/74  146/84  145/81    HR:  68-85  Wt Readings from Last 4 Encounters:   19 74.3 kg (163 lb 14.4 oz)   18 67.4 kg (148 lb 8 oz)   18 74 kg (163 lb 1.6 oz)   18 70.9 kg (156 lb 4.8 oz)     ALLERGIES: No known allergies  PAST MEDICAL HISTORY:  has a past medical history of HTN (hypertension). She also has no past medical history of Chronic infection, Malignant hyperthermia, or Sleep apnea.  PAST SURGICAL HISTORY:  has a past surgical history that includes appendectomy; Open reduction internal fixation hip nailing (2014); Open reduction internal fixation hip (unk, prior to ); Arthroplasty hip (Right, 2015); and Remove hardware hip nailing (Right, 2015).  FAMILY HISTORY: family history includes C.A.D. in her father; Prostate Cancer in her brother.  SOCIAL HISTORY:  reports that  has never smoked. She does not have any smokeless tobacco history on file. She reports that she drinks alcohol. She reports that she does not use drugs.    MEDICATIONS:  Current  "Outpatient Medications   Medication Sig Dispense Refill     Acetaminophen (TYLENOL PO) Take 650 mg by mouth every 4 hours as needed for mild pain or fever       AMOXICILLIN PO Give 2 gram by mouth as needed for Prophylaxis Dental Appointment       aspirin 81 MG chewable tablet Take 1 tablet (81 mg) by mouth daily 108 tablet 3     ORDER FOR DME Equipment being ordered: Walker Wheels () and Walker ()  Treatment Diagnosis: Impaired functional mobility tolerance and safety concerns with gait and transfer skills. 1 each 0     senna-docusate (SENOKOT-S;PERICOLACE) 8.6-50 MG per tablet Take 1 tablet by mouth daily as needed for constipation       trolamine salicylate (ASPERCREME) 10 % cream Apply topically 3 times daily as needed Apply to left knee        Medications reviewed:  Medications reconciled to facility chart and changes were made to reflect current medications as identified as above med list. Below are the changes that were made:   Medications stopped since last EPIC medication reconciliation:   There are no discontinued medications.    Medications started since last [facility] medication reconciliation:  No orders of the defined types were placed in this encounter.        Case Management:  I have reviewed the care plan and MDS and do agree with the plan. Patient's desire to return to the community is not assessible due to cognitive impairment.  Information reviewed:  Medications, vital signs, orders, and nursing notes.    ROS:  Unobtainable secondary to cognitive impairment.     Exam:  Vitals: /74   Pulse 71   Temp 97.1  F (36.2  C)   Resp 16   Ht 1.702 m (5' 7\")   Wt 74.3 kg (163 lb 14.4 oz)   SpO2 99%   BMI 25.67 kg/m     BMI= Body mass index is 25.67 kg/m .  GENERAL APPEARANCE:  Alert, in no distress  ENT:  Mouth and posterior oropharynx normal, moist mucous membranes  RESP:  respiratory effort and palpation of chest normal, lungs clear to auscultation , no respiratory distress  CV:  " Palpation and auscultation of heart done , regular rate and rhythm, no murmur, rub, or gallop  M/S:   Gait and station normal  Digits and nails normal  SKIN:  Inspection of skin and subcutaneous tissue baseline, Palpation of skin and subcutaneous tissue baseline  NEURO:   Cranial nerves 2-12 are normal tested and grossly at patient's baseline  PSYCH:  insight and judgement impaired, memory impaired , affect and mood normal    Lab/Diagnostic data:   CBC RESULTS:   Recent Labs   Lab Test 09/25/18 10/03/17   WBC 7.3 8.3   RBC 5.34 5.57*   HGB 15.9 17.2*   HCT 49.8* 53.8*   MCV 93 97   MCH 29.8 30.9   MCHC 31.9* 32.0   RDW 14.0 13.8    278       Last Basic Metabolic Panel:  Recent Labs   Lab Test 09/25/18 10/03/17    142   POTASSIUM 4.4 4.1   CHLORIDE 104 105   JANELLE 9.8 9.9   CO2 26 26   BUN 15 13   CR 0.80 0.84   GLC 76 66*       Liver Function Studies -   Recent Labs   Lab Test 01/11/14  1455   PROTTOTAL 6.9   ALBUMIN 3.8   BILITOTAL 0.5   ALKPHOS 63   AST 26   ALT 33         ASSESSMENT/PLAN  (D45) Primary polycythemia (H)  (primary encounter diagnosis)  Hemoglobin stable and resident remains asymptomatic. Follow CBC q 6 months and PRN. Continue ASA as ordered.     (M15.0) Primary osteoarthritis involving multiple joints  Longstanding history of bilateral hip pain. Pain managed on PRN acetaminophen and PRN Aspercreme. No changes at this time and adjustments as clinically.     (I10) Essential hypertension  Chronic, blood pressure stable off antihypertensive agents. Keep SBP> 130 mmHg and DBP > 65 mmHg (levels below these increase mortality as shown by standard studies and observations).       Electronically signed by:  GI Ash CNP      Sincerely,        Lexii Reid, NP

## 2019-03-11 ENCOUNTER — CLINICAL UPDATE (OUTPATIENT)
Dept: PHARMACY | Facility: CLINIC | Age: 84
End: 2019-03-11

## 2019-03-11 NOTE — PROGRESS NOTES
This patient's medication list and chart were reviewed as part of the service provided by [facility].    Assessment/Recommendations:  No recommendations for changes at this time.    Meena Lerner, Pharm.D.,[facility]  Board Certified Geriatric Pharmacist  Medication Therapy Management Pharmacist  [phone]

## 2019-04-14 VITALS
BODY MASS INDEX: 26.01 KG/M2 | TEMPERATURE: 98.2 F | RESPIRATION RATE: 18 BRPM | DIASTOLIC BLOOD PRESSURE: 82 MMHG | SYSTOLIC BLOOD PRESSURE: 116 MMHG | HEART RATE: 83 BPM | WEIGHT: 165.7 LBS | OXYGEN SATURATION: 99 % | HEIGHT: 67 IN

## 2019-04-14 RX ORDER — ECHINACEA PURPUREA EXTRACT 125 MG
1 TABLET ORAL
COMMUNITY
End: 2020-09-07

## 2019-04-14 ASSESSMENT — MIFFLIN-ST. JEOR: SCORE: 1204.24

## 2019-04-15 ENCOUNTER — NURSING HOME VISIT (OUTPATIENT)
Dept: GERIATRICS | Facility: CLINIC | Age: 84
End: 2019-04-15
Payer: COMMERCIAL

## 2019-04-15 DIAGNOSIS — D45 PRIMARY POLYCYTHEMIA (H): ICD-10-CM

## 2019-04-15 DIAGNOSIS — G30.1 LATE ONSET ALZHEIMER'S DISEASE WITHOUT BEHAVIORAL DISTURBANCE (H): ICD-10-CM

## 2019-04-15 DIAGNOSIS — M15.0 PRIMARY OSTEOARTHRITIS INVOLVING MULTIPLE JOINTS: Primary | ICD-10-CM

## 2019-04-15 DIAGNOSIS — F02.80 LATE ONSET ALZHEIMER'S DISEASE WITHOUT BEHAVIORAL DISTURBANCE (H): ICD-10-CM

## 2019-04-15 PROCEDURE — 99308 SBSQ NF CARE LOW MDM 20: CPT | Performed by: INTERNAL MEDICINE

## 2019-04-15 NOTE — LETTER
"    4/15/2019        RE: Lesvia Barney  [address] [address]  [facility] [address]        Lesvia Barney is a 89 year old female seen April 15, 2019 at [facility] where she has resided for 3 years (admit 2015) seen for routine visit.      She is seen in her room, up to chair   States she is doing well, \"I'm going home today\"   She talks about leaving, but not actively exit-seeking and is generally cooperative with nursing staff.      Patient has progressive dementia, now with very low functional status.   Doing fairly well in [facility] setting, with a daily routine.     She has HTN by history, but sbps here fairly stable without anti-hypertensive tx:  BP Readings from Last 3 Encounters:   19 116/82   19 134/74   18 121/74       She remains ambulatory with a 4WW but with significant deformity of LE joints, flexed at knees and hips, forward stoop.   Has become a difficult transfer, per nursing staff.          Past Medical History   Diagnosis Date     HTN (hypertension)    Grade I LV diastolic dysfunction, EF 55-60% by ECHO   Late onset dementia, Alzheimer's type  DJD   S/P femoral neck fracture , with subsequent AVN requiring ATIYA revision with hardware removal 2015    Primary polycythemia       SH:  Single, retired from working in a bank.   Her brother  leaving her without any close kin.   A neighbor Sarah is her POA.     ROS:  +incontinence; BIMS 8/15  Wt Readings from Last 5 Encounters:   19 75.2 kg (165 lb 11.2 oz)   19 74.3 kg (163 lb 14.4 oz)   18 67.4 kg (148 lb 8 oz)   18 74 kg (163 lb 1.6 oz)   18 70.9 kg (156 lb 4.8 oz)        EXAM:  NAD, alert and oriented x1  /82   Pulse 83   Temp 98.2  F (36.8  C)   Resp 18   Ht 1.702 m (5' 7\")   Wt 75.2 kg (165 lb 11.2 oz)   SpO2 99%   BMI 25.95 kg/m      Neck supple without adenopathy  Lungs with decreased BS, no rales or wheeze  Heart RRR s1s2, without murmur  Abd soft, NT, " no distention, +BS, no masses, no rebound or guarding.     Ext trace ankle edema L>R, valgus deformity of left knee, and chronic hip flexion  Neuro: no tremor or stiffness, +wordfinding difficulty.  Psych: affect okay    Labs reviewed    IMP/PLAN:  (D45) Primary polycythemia (H)  Comment: hgb 15.9 on most recent check; no clear cause documented, no h/o lung disease or hypoxia  Plan: She is not a candidate for a workup  Continue daily aspirin 81 mg empirically.      (M15.0) Primary osteoarthritis involving multiple joints   Comment: significant deforming changes of OA in LE joints, but no reports of pain.  Plan: Assist with transfers.   Ambulation with walker  Local measures and prn acetaminophen should she c/o pain.         (G30.1,  F02.80) Late onset Alzheimer's disease without behavioral disturbance  Comment:  gradual decline over past few years, now with loss of language and functional status  Plan: Board and Care for assist with meals, meds, cares, activity.        Advanced directives: DNR/DNI.   Friend Sarah Irving is first contact.      Nabila Velasquez MD       Sincerely,        Nabila Velasquez MD

## 2019-04-19 NOTE — PROGRESS NOTES
"Lesvia Barney is a 89 year old female seen April 15, 2019 at [facility] where she has resided for 3 years (admit 2015) seen for routine visit.      She is seen in her room, up to chair   States she is doing well, \"I'm going home today\"   She talks about leaving, but not actively exit-seeking and is generally cooperative with nursing staff.      Patient has progressive dementia, now with very low functional status.   Doing fairly well in [facility] setting, with a daily routine.     She has HTN by history, but sbps here fairly stable without anti-hypertensive tx:  BP Readings from Last 3 Encounters:   19 116/82   19 134/74   18 121/74       She remains ambulatory with a 4WW but with significant deformity of LE joints, flexed at knees and hips, forward stoop.   Has become a difficult transfer, per nursing staff.          Past Medical History   Diagnosis Date     HTN (hypertension)    Grade I LV diastolic dysfunction, EF 55-60% by ECHO   Late onset dementia, Alzheimer's type  DJD   S/P femoral neck fracture , with subsequent AVN requiring ATIYA revision with hardware removal 2015    Primary polycythemia       SH:  Single, retired from working in a bank.   Her brother  leaving her without any close kin.   A neighbor Sarah is her POA.     ROS:  +incontinence; BIMS 8/15  Wt Readings from Last 5 Encounters:   19 75.2 kg (165 lb 11.2 oz)   19 74.3 kg (163 lb 14.4 oz)   18 67.4 kg (148 lb 8 oz)   18 74 kg (163 lb 1.6 oz)   18 70.9 kg (156 lb 4.8 oz)        EXAM:  NAD, alert and oriented x1  /82   Pulse 83   Temp 98.2  F (36.8  C)   Resp 18   Ht 1.702 m (5' 7\")   Wt 75.2 kg (165 lb 11.2 oz)   SpO2 99%   BMI 25.95 kg/m     Neck supple without adenopathy  Lungs with decreased BS, no rales or wheeze  Heart RRR s1s2, without murmur  Abd soft, NT, no distention, +BS, no masses, no rebound or guarding.     Ext trace ankle edema L>R, " valgus deformity of left knee, and chronic hip flexion  Neuro: no tremor or stiffness, +wordfinding difficulty.  Psych: affect okay    Labs reviewed    IMP/PLAN:  (D45) Primary polycythemia (H)  Comment: hgb 15.9 on most recent check; no clear cause documented, no h/o lung disease or hypoxia  Plan: She is not a candidate for a workup  Continue daily aspirin 81 mg empirically.      (M15.0) Primary osteoarthritis involving multiple joints   Comment: significant deforming changes of OA in LE joints, but no reports of pain.  Plan: Assist with transfers.   Ambulation with walker  Local measures and prn acetaminophen should she c/o pain.         (G30.1,  F02.80) Late onset Alzheimer's disease without behavioral disturbance  Comment:  gradual decline over past few years, now with loss of language and functional status  Plan: Board and Care for assist with meals, meds, cares, activity.        Advanced directives: DNR/DNI.   Friend Sarah Irving is first contact.      Nabila Velasquez MD

## 2019-06-04 ENCOUNTER — NURSING HOME VISIT (OUTPATIENT)
Dept: GERIATRICS | Facility: CLINIC | Age: 84
End: 2019-06-04
Payer: COMMERCIAL

## 2019-06-04 VITALS
SYSTOLIC BLOOD PRESSURE: 128 MMHG | OXYGEN SATURATION: 99 % | HEART RATE: 69 BPM | TEMPERATURE: 97 F | WEIGHT: 165.9 LBS | HEIGHT: 67 IN | RESPIRATION RATE: 20 BRPM | DIASTOLIC BLOOD PRESSURE: 70 MMHG | BODY MASS INDEX: 26.04 KG/M2

## 2019-06-04 DIAGNOSIS — I10 ESSENTIAL HYPERTENSION: ICD-10-CM

## 2019-06-04 DIAGNOSIS — M15.0 PRIMARY OSTEOARTHRITIS INVOLVING MULTIPLE JOINTS: ICD-10-CM

## 2019-06-04 DIAGNOSIS — G30.1 LATE ONSET ALZHEIMER'S DISEASE WITHOUT BEHAVIORAL DISTURBANCE (H): Primary | ICD-10-CM

## 2019-06-04 DIAGNOSIS — D45 PRIMARY POLYCYTHEMIA (H): ICD-10-CM

## 2019-06-04 DIAGNOSIS — F02.80 LATE ONSET ALZHEIMER'S DISEASE WITHOUT BEHAVIORAL DISTURBANCE (H): Primary | ICD-10-CM

## 2019-06-04 PROCEDURE — 99309 SBSQ NF CARE MODERATE MDM 30: CPT | Performed by: NURSE PRACTITIONER

## 2019-06-04 ASSESSMENT — MIFFLIN-ST. JEOR: SCORE: 1205.15

## 2019-06-04 NOTE — LETTER
6/4/2019        RE: Lesvia Barney  [address] [address]  [facility] [address]        [place] [facility]  Chief Complaint   Patient presents with     longterm Regulatory     [place] Medical Record Number:  [id number]  Place of Service where encounter took place:  [facility] (FGS) [[id number]]    HPI:    Lesvia Barney  is 89 year old (10/29/1928), who is being seen today for a federally mandated E/M visit.  HPI information obtained from: facility chart records, facility staff and patient report.     Today's concerns are:   Diagnosis Comments   1. Late onset Alzheimer's disease without behavioral disturbance  BIMS 5/15. No behaviors noted by staff and documentation. Weight stable 155# +/- 5 lbs. No recent falls.    2. Primary osteoarthritis involving multiple joints  Denies pain.Has PRN acetaminophen available and PRN aspercreme.    3. Primary polycythemia (H)  See labs    4. Essential hypertension  BP Readings from Last 3 Encounters:   06/04/19 128/70   04/14/19 116/82   02/13/19 134/74   Not currently taking antihypertensive agents. Denies. CP, SOB or lightheadedness.            ALLERGIES:No known allergies  PAST MEDICAL HISTORY:   has a past medical history of HTN (hypertension). She also has no past medical history of Chronic infection, Malignant hyperthermia, or Sleep apnea.  PAST SURGICAL HISTORY:   has a past surgical history that includes appendectomy; Open reduction internal fixation hip nailing (1/12/2014); Open reduction internal fixation hip (unk, prior to 2014); Arthroplasty hip (Right, 8/31/2015); and Remove hardware hip nailing (Right, 8/31/2015).  FAMILY HISTORY: family history includes C.A.D. in her father; Prostate Cancer in her brother.  SOCIAL HISTORY:  reports that she has never smoked. She does not have any smokeless tobacco history on file. She reports that she drinks alcohol. She reports that she does not use drugs.    MEDICATIONS:  Current Outpatient Medications  "  Medication Sig Dispense Refill     Acetaminophen (TYLENOL PO) Take 650 mg by mouth every 4 hours as needed for mild pain or fever       AMOXICILLIN PO Give 2 gram by mouth as needed for Prophylaxis Dental Appointment       aspirin 81 MG chewable tablet Take 1 tablet (81 mg) by mouth daily 108 tablet 3     senna-docusate (SENOKOT-S;PERICOLACE) 8.6-50 MG per tablet Take 1 tablet by mouth daily as needed for constipation       sodium chloride (OCEAN NASAL SPRAY) 0.65 % nasal spray Spray 1 spray into both nostrils every hour as needed for congestion for Nasal bleeding.       trolamine salicylate (ASPERCREME) 10 % cream Apply topically 3 times daily as needed Apply to left knee          Case Management:  I have reviewed the care plan and MDS and do agree with the plan. Patient's desire to return to the community is not assessible due to cognitive impairment. Information reviewed:  Medications, vital signs, orders, and nursing notes.    ROS:  Unobtainable secondary to cognitive impairment.     Vitals:  /70   Pulse 69   Temp 97  F (36.1  C)   Resp 20   Ht 1.702 m (5' 7\")   Wt 75.3 kg (165 lb 14.4 oz)   SpO2 99%   BMI 25.98 kg/m     Body mass index is 25.98 kg/m .  Exam:  GENERAL APPEARANCE:  Alert  ENT:  Mouth and posterior oropharynx normal, moist mucous membranes, normal hearing acuity  RESP:  respiratory effort and palpation of chest normal, lungs clear to auscultation , no respiratory distress  CV:  Palpation and auscultation of heart done , regular rate and rhythm, no murmur, rub, or gallop  M/S:   Gait and station normal  Digits and nails normal  SKIN:  Inspection of skin and subcutaneous tissue baseline, Palpation of skin and subcutaneous tissue baseline  NEURO:   Cranial nerves 2-12 are normal tested and grossly at patient's baseline  PSYCH:  insight and judgement impaired, memory impaired , affect and mood normal    Lab/Diagnostic data:   Labs done in SNF are in [facility]. Please refer to them " using [facility]/Care Everywhere. and Recent labs in EPIC reviewed by me today.     ASSESSMENT/PLAN  (G30.1,  F02.80) Late onset Alzheimer's disease without behavioral disturbance  (primary encounter diagnosis)  Comment: Mood and behavior stable. No recent falls.   Plan:Resides on secure dementia unit with assistance for ADLs and meals.    (M15.0) Primary osteoarthritis involving multiple joints  Comment: Denies pain upon today's exam  Plan: Continue PRN acetaminophen and aspercreme. Continue to monitor and notify NP with changes.     (D45) Primary polycythemia (H)  Comment: hemoglobin stable.   Plan: Not candidate for workup. Continue daily ASA 81 mg    (I10) Essential hypertension  Comment: Chronic, managed off antihypertensive agents.   Plan: Keep SBP> 130 mmHg and DBP > 65 mmHg (levels below these increase mortality as shown by standard studies and observations).       The current medical regimen is effective; continue present plan and medications.    Electronically signed by:  GI Ash [facility]           Sincerely,        Lexii Reid NP

## 2019-06-04 NOTE — PROGRESS NOTES
[place] [facility]  Chief Complaint   Patient presents with     skilled nursing Regulatory     [place] Medical Record Number:  [id number]  Place of Service where encounter took place:  [facility] (FGS) [[id number]]    HPI:    Lesvia Barney  is 89 year old (10/29/1928), who is being seen today for a federally mandated E/M visit.  HPI information obtained from: facility chart records, facility staff and patient report.     Today's concerns are:   Diagnosis Comments   1. Late onset Alzheimer's disease without behavioral disturbance  BIMS 5/15. No behaviors noted by staff and documentation. Weight stable 155# +/- 5 lbs. No recent falls.    2. Primary osteoarthritis involving multiple joints  Denies pain.Has PRN acetaminophen available and PRN aspercreme.    3. Primary polycythemia (H)  See labs    4. Essential hypertension  BP Readings from Last 3 Encounters:   06/04/19 128/70   04/14/19 116/82   02/13/19 134/74   Not currently taking antihypertensive agents. Denies. CP, SOB or lightheadedness.            ALLERGIES:No known allergies  PAST MEDICAL HISTORY:   has a past medical history of HTN (hypertension). She also has no past medical history of Chronic infection, Malignant hyperthermia, or Sleep apnea.  PAST SURGICAL HISTORY:   has a past surgical history that includes appendectomy; Open reduction internal fixation hip nailing (1/12/2014); Open reduction internal fixation hip (unk, prior to 2014); Arthroplasty hip (Right, 8/31/2015); and Remove hardware hip nailing (Right, 8/31/2015).  FAMILY HISTORY: family history includes C.A.D. in her father; Prostate Cancer in her brother.  SOCIAL HISTORY:  reports that she has never smoked. She does not have any smokeless tobacco history on file. She reports that she drinks alcohol. She reports that she does not use drugs.    MEDICATIONS:  Current Outpatient Medications   Medication Sig Dispense Refill     Acetaminophen (TYLENOL PO) Take 650 mg by mouth  "every 4 hours as needed for mild pain or fever       AMOXICILLIN PO Give 2 gram by mouth as needed for Prophylaxis Dental Appointment       aspirin 81 MG chewable tablet Take 1 tablet (81 mg) by mouth daily 108 tablet 3     senna-docusate (SENOKOT-S;PERICOLACE) 8.6-50 MG per tablet Take 1 tablet by mouth daily as needed for constipation       sodium chloride (OCEAN NASAL SPRAY) 0.65 % nasal spray Spray 1 spray into both nostrils every hour as needed for congestion for Nasal bleeding.       trolamine salicylate (ASPERCREME) 10 % cream Apply topically 3 times daily as needed Apply to left knee          Case Management:  I have reviewed the care plan and MDS and do agree with the plan. Patient's desire to return to the community is not assessible due to cognitive impairment. Information reviewed:  Medications, vital signs, orders, and nursing notes.    ROS:  Unobtainable secondary to cognitive impairment.     Vitals:  /70   Pulse 69   Temp 97  F (36.1  C)   Resp 20   Ht 1.702 m (5' 7\")   Wt 75.3 kg (165 lb 14.4 oz)   SpO2 99%   BMI 25.98 kg/m    Body mass index is 25.98 kg/m .  Exam:  GENERAL APPEARANCE:  Alert  ENT:  Mouth and posterior oropharynx normal, moist mucous membranes, normal hearing acuity  RESP:  respiratory effort and palpation of chest normal, lungs clear to auscultation , no respiratory distress  CV:  Palpation and auscultation of heart done , regular rate and rhythm, no murmur, rub, or gallop  M/S:   Gait and station normal  Digits and nails normal  SKIN:  Inspection of skin and subcutaneous tissue baseline, Palpation of skin and subcutaneous tissue baseline  NEURO:   Cranial nerves 2-12 are normal tested and grossly at patient's baseline  PSYCH:  insight and judgement impaired, memory impaired , affect and mood normal    Lab/Diagnostic data:   Labs done in SNF are in [facility]. Please refer to them using [facility]/Care Everywhere. and Recent labs in [facility] reviewed by me today. "     ASSESSMENT/PLAN  (G30.1,  F02.80) Late onset Alzheimer's disease without behavioral disturbance  (primary encounter diagnosis)  Comment: Mood and behavior stable. No recent falls.   Plan:Resides on secure dementia unit with assistance for ADLs and meals.    (M15.0) Primary osteoarthritis involving multiple joints  Comment: Denies pain upon today's exam  Plan: Continue PRN acetaminophen and aspercreme. Continue to monitor and notify NP with changes.     (D45) Primary polycythemia (H)  Comment: hemoglobin stable.   Plan: Not candidate for workup. Continue daily ASA 81 mg    (I10) Essential hypertension  Comment: Chronic, managed off antihypertensive agents.   Plan: Keep SBP> 130 mmHg and DBP > 65 mmHg (levels below these increase mortality as shown by standard studies and observations).       The current medical regimen is effective; continue present plan and medications.    Electronically signed by:  GI Ash CNP  [facility]

## 2019-07-30 ENCOUNTER — AMBULATORY - HEALTHEAST (OUTPATIENT)
Dept: OTHER | Facility: CLINIC | Age: 84
End: 2019-07-30

## 2019-07-30 ENCOUNTER — DOCUMENTATION ONLY (OUTPATIENT)
Dept: OTHER | Facility: CLINIC | Age: 84
End: 2019-07-30

## 2019-08-08 ENCOUNTER — NURSING HOME VISIT (OUTPATIENT)
Dept: GERIATRICS | Facility: CLINIC | Age: 84
End: 2019-08-08
Payer: COMMERCIAL

## 2019-08-08 VITALS
BODY MASS INDEX: 25.35 KG/M2 | HEIGHT: 67 IN | RESPIRATION RATE: 16 BRPM | WEIGHT: 161.5 LBS | SYSTOLIC BLOOD PRESSURE: 135 MMHG | TEMPERATURE: 97.5 F | DIASTOLIC BLOOD PRESSURE: 77 MMHG | OXYGEN SATURATION: 99 % | HEART RATE: 70 BPM

## 2019-08-08 DIAGNOSIS — G30.1 LATE ONSET ALZHEIMER'S DISEASE WITHOUT BEHAVIORAL DISTURBANCE (H): ICD-10-CM

## 2019-08-08 DIAGNOSIS — M15.0 PRIMARY OSTEOARTHRITIS INVOLVING MULTIPLE JOINTS: Primary | ICD-10-CM

## 2019-08-08 DIAGNOSIS — F02.80 LATE ONSET ALZHEIMER'S DISEASE WITHOUT BEHAVIORAL DISTURBANCE (H): ICD-10-CM

## 2019-08-08 DIAGNOSIS — D45 PRIMARY POLYCYTHEMIA (H): ICD-10-CM

## 2019-08-08 PROCEDURE — 99308 SBSQ NF CARE LOW MDM 20: CPT | Performed by: INTERNAL MEDICINE

## 2019-08-08 ASSESSMENT — MIFFLIN-ST. JEOR: SCORE: 1185.19

## 2019-08-08 NOTE — LETTER
"    2019        RE: Lesvia Barney  [address] [address]  [facility] [address]        Lesvia Barney is a 89 year old female seen 2019 at [facility] unit where she has resided for 4 years (admit 2015) seen to follow up Alzheimer's dementia.       She is seen on the unit, up to chair   States she is doing well, no new concerns reported.    She occasionally talks about leaving, but not actively exit-seeking and is generally cooperative with nursing staff.      Patient has progressive dementia, now with very low functional status.   Doing fairly well in [facility] setting, with a daily routine.     She has HTN by history, but sbps here fairly stable without anti-hypertensive tx:  BP Readings from Last 3 Encounters:   19 135/77   19 128/70   19 116/82       She remains ambulatory with a 4WW but with significant deformity of LE joints, flexed at knees and hips, forward stoop.   Has become a difficult transfer.          Past Medical History   Diagnosis Date     HTN (hypertension)    Grade I LV diastolic dysfunction, EF 55-60% by ECHO   Late onset dementia, Alzheimer's type  DJD   S/P femoral neck fracture , with subsequent AVN requiring ATIYA revision with hardware removal 2015    Primary polycythemia       SH:  Single, retired from working in a bank.   Her brother  leaving her without any close kin.   A neighbor Sarah is her POA.     ROS:  +incontinence; BIMS 8/15  Wt Readings from Last 5 Encounters:   19 73.3 kg (161 lb 8 oz)   19 75.3 kg (165 lb 14.4 oz)   19 75.2 kg (165 lb 11.2 oz)   19 74.3 kg (163 lb 14.4 oz)   18 67.4 kg (148 lb 8 oz)        EXAM:  NAD, alert and oriented x1  /77   Pulse 70   Temp 97.5  F (36.4  C)   Resp 16   Ht 1.702 m (5' 7\")   Wt 73.3 kg (161 lb 8 oz)   SpO2 99%   BMI 25.29 kg/m      Neck supple without adenopathy  Lungs with decreased BS, no rales or wheeze  Heart RRR s1s2, " without murmur  Abd soft, NT, no distention, +BS, no masses, no rebound or guarding.     Ext trace ankle edema L>R, valgus deformity of left knee, and chronic hip flexion  Neuro: no tremor or stiffness, +wordfinding difficulty.  Psych: affect okay    Labs reviewed    IMP/PLAN:  (D45) Primary polycythemia (H)  Comment: hgb 15.9 on most recent check; no clear cause documented, no h/o lung disease or hypoxia  Plan: She is not a candidate for a workup  Continue daily aspirin 81 mg empirically.      (M15.0) Primary osteoarthritis involving multiple joints   Comment: significant deforming changes of OA in LE joints, but no reports of pain.  Plan: Assist with transfers.   Ambulation with walker  Local measures and prn acetaminophen should she c/o pain.         (G30.1,  F02.80) Late onset Alzheimer's disease without behavioral disturbance  Comment:  gradual decline over past few years, now with loss of language and functional status  Plan: Board and Care for assist with meals, meds, cares, activity.        Advanced directives: DNR/DNI.   Friend Sarah Irving is first contact.      Nabila Velasquez MD       Sincerely,        Nabila Velasquez MD

## 2019-08-19 NOTE — PROGRESS NOTES
"Lesvia Barney is a 89 year old female seen 2019 at [facility] unit where she has resided for 4 years (admit 2015) seen to follow up Alzheimer's dementia.       She is seen on the unit, up to chair   States she is doing well, no new concerns reported.    She occasionally talks about leaving, but not actively exit-seeking and is generally cooperative with nursing staff.      Patient has progressive dementia, now with very low functional status.   Doing fairly well in [facility] and [facility] setting, with a daily routine.     She has HTN by history, but sbps here fairly stable without anti-hypertensive tx:  BP Readings from Last 3 Encounters:   19 135/77   19 128/70   19 116/82       She remains ambulatory with a 4WW but with significant deformity of LE joints, flexed at knees and hips, forward stoop.   Has become a difficult transfer.          Past Medical History   Diagnosis Date     HTN (hypertension)    Grade I LV diastolic dysfunction, EF 55-60% by ECHO   Late onset dementia, Alzheimer's type  DJD   S/P femoral neck fracture , with subsequent AVN requiring ATIYA revision with hardware removal 2015    Primary polycythemia       SH:  Single, retired from working in a bank.   Her brother  leaving her without any close kin.   A neighbor Sarah is her POA.     ROS:  +incontinence; BIMS 8/15  Wt Readings from Last 5 Encounters:   19 73.3 kg (161 lb 8 oz)   19 75.3 kg (165 lb 14.4 oz)   19 75.2 kg (165 lb 11.2 oz)   19 74.3 kg (163 lb 14.4 oz)   18 67.4 kg (148 lb 8 oz)        EXAM:  NAD, alert and oriented x1  /77   Pulse 70   Temp 97.5  F (36.4  C)   Resp 16   Ht 1.702 m (5' 7\")   Wt 73.3 kg (161 lb 8 oz)   SpO2 99%   BMI 25.29 kg/m     Neck supple without adenopathy  Lungs with decreased BS, no rales or wheeze  Heart RRR s1s2, without murmur  Abd soft, NT, no distention, +BS, no masses, no rebound or guarding.   "   Ext trace ankle edema L>R, valgus deformity of left knee, and chronic hip flexion  Neuro: no tremor or stiffness, +wordfinding difficulty.  Psych: affect okay    Labs reviewed    IMP/PLAN:  (D45) Primary polycythemia (H)  Comment: hgb 15.9 on most recent check; no clear cause documented, no h/o lung disease or hypoxia  Plan: She is not a candidate for a workup  Continue daily aspirin 81 mg empirically.      (M15.0) Primary osteoarthritis involving multiple joints   Comment: significant deforming changes of OA in LE joints, but no reports of pain.  Plan: Assist with transfers.   Ambulation with walker  Local measures and prn acetaminophen should she c/o pain.         (G30.1,  F02.80) Late onset Alzheimer's disease without behavioral disturbance  Comment:  gradual decline over past few years, now with loss of language and functional status  Plan: Board and Care for assist with meals, meds, cares, activity.        Advanced directives: DNR/DNI.   Friend Sarah Irving is first contact.      Nabila Velasquez MD

## 2019-08-20 ENCOUNTER — NURSING HOME VISIT (OUTPATIENT)
Dept: GERIATRICS | Facility: CLINIC | Age: 84
End: 2019-08-20
Payer: COMMERCIAL

## 2019-08-20 VITALS
HEIGHT: 67 IN | TEMPERATURE: 98.2 F | SYSTOLIC BLOOD PRESSURE: 136 MMHG | WEIGHT: 162.3 LBS | HEART RATE: 82 BPM | RESPIRATION RATE: 18 BRPM | OXYGEN SATURATION: 99 % | DIASTOLIC BLOOD PRESSURE: 73 MMHG | BODY MASS INDEX: 25.47 KG/M2

## 2019-08-20 DIAGNOSIS — T14.8XXA BRUISING: Primary | ICD-10-CM

## 2019-08-20 DIAGNOSIS — F69 ADULT BEHAVIOR PROBLEM: ICD-10-CM

## 2019-08-20 DIAGNOSIS — R41.89 COGNITIVE IMPAIRMENT: ICD-10-CM

## 2019-08-20 PROCEDURE — 99309 SBSQ NF CARE MODERATE MDM 30: CPT | Performed by: NURSE PRACTITIONER

## 2019-08-20 ASSESSMENT — MIFFLIN-ST. JEOR: SCORE: 1188.82

## 2019-08-20 NOTE — LETTER
"    8/20/2019        RE: Lesvia Barney  [address] [address]  [facility] [address]        [place] [facility]  [place] Medical Record Number:  [id number]  Place of Service where encounter took place:  [facility] ([facility]) [[id number]]  Chief Complaint   Patient presents with     Nursing Home Acute       HPI:    Lesvia Barney  is a 89 year old (10/29/1928), who is being seen today for an episodic care visit.  HPI information obtained from: facility chart records, facility staff and patient report.     Today's concern is:    ICD-10-CM    1. Bruising T14.8XXA    Notified by staff that resident has bruising along left breast and left inner thigh. Due to severe cognitive impairment resident unable to recall details. Did state \"no one hurts me here\". Has history of being resistant to cares. No falls noted and resident denies pain to left breast or left leg.     Past Medical and Surgical History reviewed in Epic today.    MEDICATIONS:  Current Outpatient Medications   Medication Sig Dispense Refill     Acetaminophen (TYLENOL PO) Take 650 mg by mouth every 4 hours as needed for mild pain or fever       AMOXICILLIN PO Give 2 gram by mouth as needed for Prophylaxis Dental Appointment       aspirin 81 MG chewable tablet Take 1 tablet (81 mg) by mouth daily 108 tablet 3     senna-docusate (SENOKOT-S;PERICOLACE) 8.6-50 MG per tablet Take 1 tablet by mouth daily as needed for constipation       sodium chloride (OCEAN NASAL SPRAY) 0.65 % nasal spray Spray 1 spray into both nostrils every hour as needed for congestion for Nasal bleeding.       trolamine salicylate (ASPERCREME) 10 % cream Apply topically 3 times daily as needed Apply to left knee            REVIEW OF SYSTEMS:  Unobtainable secondary to cognitive impairment.     Objective:  /73   Pulse 82   Temp 98.2  F (36.8  C)   Resp 18   Ht 1.702 m (5' 7\")   Wt 73.6 kg (162 lb 4.8 oz)   SpO2 99%   BMI 25.42 kg/m     Exam:  GENERAL APPEARANCE:  " Alert, in no distress  RESP:  respiratory effort and palpation of chest normal, lungs clear to auscultation , no respiratory distress  CV:  Palpation and auscultation of heart done , regular rate and rhythm, no murmur, rub, or gallop  SKIN:  hematoma left breast and left inner thigh  NEURO:   Cranial nerves 2-12 are normal tested and grossly at patient's baseline  PSYCH:  insight and judgement impaired, memory impaired , affect and mood normal    Labs:   Labs done in SNF are in [facility]. Please refer to them using [facility]/Care Everywhere. and Recent labs in EPIC reviewed by me today.     ASSESSMENT/PLAN:  (T14.8XXA) Bruising  (primary encounter diagnosis)  (F69) adult Behavior Problem  (R41.189) Cognitive impairment   Comment: Acute bruising left breast/inner thigh with unknown etiology. History of resistance with daily cares. No documentation of fall or injury.   Plan:  1.) Order ACP to talk with resident. Recommendations appreciated.   2.) Message left with family/POA.   3.) Continue to monitor, may need medication if continues to demonstrate behaviors with cares.       Electronically signed by:  GI Ash [facility]             Sincerely,        Lexii Reid NP

## 2019-08-20 NOTE — PROGRESS NOTES
"[place] [facility]  [place] Medical Record Number:  [id number]  Place of Service where encounter took place:  [facility] (FGS) [[id number]]  Chief Complaint   Patient presents with     Nursing Home Acute       HPI:    Lesvia Barney  is a 89 year old (10/29/1928), who is being seen today for an episodic care visit.  HPI information obtained from: facility chart records, facility staff and patient report.     Today's concern is:    ICD-10-CM    1. Bruising T14.8XXA    Notified by staff that resident has bruising along left breast and left inner thigh. Due to severe cognitive impairment resident unable to recall details. Did state \"no one hurts me here\". Has history of being resistant to cares. No falls noted and resident denies pain to left breast or left leg.     Past Medical and Surgical History reviewed in Epic today.    MEDICATIONS:  Current Outpatient Medications   Medication Sig Dispense Refill     Acetaminophen (TYLENOL PO) Take 650 mg by mouth every 4 hours as needed for mild pain or fever       AMOXICILLIN PO Give 2 gram by mouth as needed for Prophylaxis Dental Appointment       aspirin 81 MG chewable tablet Take 1 tablet (81 mg) by mouth daily 108 tablet 3     senna-docusate (SENOKOT-S;PERICOLACE) 8.6-50 MG per tablet Take 1 tablet by mouth daily as needed for constipation       sodium chloride (OCEAN NASAL SPRAY) 0.65 % nasal spray Spray 1 spray into both nostrils every hour as needed for congestion for Nasal bleeding.       trolamine salicylate (ASPERCREME) 10 % cream Apply topically 3 times daily as needed Apply to left knee            REVIEW OF SYSTEMS:  Unobtainable secondary to cognitive impairment.     Objective:  /73   Pulse 82   Temp 98.2  F (36.8  C)   Resp 18   Ht 1.702 m (5' 7\")   Wt 73.6 kg (162 lb 4.8 oz)   SpO2 99%   BMI 25.42 kg/m    Exam:  GENERAL APPEARANCE:  Alert, in no distress  RESP:  respiratory effort and palpation of chest normal, lungs clear to " auscultation , no respiratory distress  CV:  Palpation and auscultation of heart done , regular rate and rhythm, no murmur, rub, or gallop  SKIN:  hematoma left breast and left inner thigh  NEURO:   Cranial nerves 2-12 are normal tested and grossly at patient's baseline  PSYCH:  insight and judgement impaired, memory impaired , affect and mood normal    Labs:   Labs done in SNF are in [facility]. Please refer to them using [facility]/Care Everywhere. and Recent labs in [facility] reviewed by me today.     ASSESSMENT/PLAN:  (T14.8XXA) Bruising  (primary encounter diagnosis)  (F69) adult Behavior Problem  (R41.189) Cognitive impairment   Comment: Acute bruising left breast/inner thigh with unknown etiology. History of resistance with daily cares. No documentation of fall or injury.   Plan:  1.) Order ACP to talk with resident. Recommendations appreciated.   2.) Message left with family/POA.   3.) Continue to monitor, may need medication if continues to demonstrate behaviors with cares.       Electronically signed by:  GI Ash CNP  [facility]

## 2019-09-17 ENCOUNTER — NURSING HOME VISIT (OUTPATIENT)
Dept: GERIATRICS | Facility: CLINIC | Age: 84
End: 2019-09-17
Payer: COMMERCIAL

## 2019-09-17 VITALS
TEMPERATURE: 97.5 F | HEART RATE: 94 BPM | DIASTOLIC BLOOD PRESSURE: 76 MMHG | WEIGHT: 165.4 LBS | OXYGEN SATURATION: 99 % | HEIGHT: 67 IN | SYSTOLIC BLOOD PRESSURE: 131 MMHG | BODY MASS INDEX: 25.96 KG/M2 | RESPIRATION RATE: 18 BRPM

## 2019-09-17 DIAGNOSIS — M15.0 PRIMARY OSTEOARTHRITIS INVOLVING MULTIPLE JOINTS: ICD-10-CM

## 2019-09-17 DIAGNOSIS — G30.1 LATE ONSET ALZHEIMER'S DISEASE WITHOUT BEHAVIORAL DISTURBANCE (H): Primary | ICD-10-CM

## 2019-09-17 DIAGNOSIS — I10 ESSENTIAL HYPERTENSION: ICD-10-CM

## 2019-09-17 DIAGNOSIS — F02.80 LATE ONSET ALZHEIMER'S DISEASE WITHOUT BEHAVIORAL DISTURBANCE (H): Primary | ICD-10-CM

## 2019-09-17 DIAGNOSIS — Z13.6 SCREENING FOR HYPERTENSION: ICD-10-CM

## 2019-09-17 DIAGNOSIS — D45 PRIMARY POLYCYTHEMIA (H): ICD-10-CM

## 2019-09-17 PROCEDURE — 99318 ZZC ANNUAL NURSING FAC ASSESSMNT, STABLE: CPT | Performed by: NURSE PRACTITIONER

## 2019-09-17 ASSESSMENT — MIFFLIN-ST. JEOR: SCORE: 1202.88

## 2019-09-17 NOTE — PROGRESS NOTES
[facility]  Chief Complaint   Patient presents with     Annual Comprehensive Nursing Home     [place] [facility] Record Number:  [id number]  Place of Service where encounter took place:  [facility] ([facility]) [[id number]]    HPI:    Lesvia Barney  is a 89 year old  (10/29/1928), who is being seen today for an annual comprehensive visit. HPI information obtained from: facility chart records, facility staff and patient report.      Today's concerns are:  Primary polycythemia (H)  Etiology unknown. 02 SAT:  95-99% on room air      Primary osteoarthritis involving multiple joints  Denies pain upon today's visit. Currently uses a walker for ambulation.     Late onset Alzheimer's disease without behavioral disturbance  BIMS 5/15. No behaviors noted by staff.   Wt Readings from Last 4 Encounters:   09/17/19 75 kg (165 lb 6.4 oz)   08/20/19 73.6 kg (162 lb 4.8 oz)   08/08/19 73.3 kg (161 lb 8 oz)   06/04/19 75.3 kg (165 lb 14.4 oz)   Reports she is sleeping, eating and going to the BR without difficulty.     Screening for hypertension  Based on JNC-8 goals,  patients age of 89 year old, no presence of diabetes or CKD, and goals of care goal BP is <150/90 mm Hg. Patient is stable and continue without pharmacological invention with routine assessment. Denies chest pain, syncope or shortness of breath.     BP Readings from Last 3 Encounters:   09/17/19 131/76   08/20/19 136/73   08/08/19 135/77     Pulse Readings from Last 4 Encounters:   09/17/19 94   08/20/19 82   08/08/19 70   06/04/19 69       ALLERGIES: No known allergies  PROBLEM LIST:  Patient Active Problem List   Diagnosis     Fall with injury     Femoral neck fracture (H)     HTN (hypertension)     Cognitive impairment     Avascular necrosis of bone of hip, right (H)     S/P prosthetic total arthroplasty of the hip     Late onset Alzheimer's disease without behavioral disturbance     Primary osteoarthritis involving multiple joints      Primary polycythemia (H)     PAST MEDICAL HISTORY:  has a past medical history of HTN (hypertension). She also has no past medical history of Chronic infection; Malignant hyperthermia; or Sleep apnea.  PAST SURGICAL HISTORY:  has a past surgical history that includes appendectomy; Open reduction internal fixation hip nailing (1/12/2014); Open reduction internal fixation hip (unk, prior to 2014); Arthroplasty hip (Right, 8/31/2015); and Remove hardware hip nailing (Right, 8/31/2015).  FAMILY HISTORY: family history includes C.A.D. in her father; Prostate Cancer in her brother.  SOCIAL HISTORY:  reports that she has never smoked. She does not have any smokeless tobacco history on file. She reports that she drinks alcohol. She reports that she does not use illicit drugs.  IMMUNIZATIONS:  Most Recent Immunizations   Administered Date(s) Administered     Influenza (High Dose) 3 valent vaccine 10/03/2017     Influenza (IIV3) PF 10/20/2016     Pneumo Conj 13-V (2010&after) 10/15/2015     Pneumococcal 23 valent 11/09/1999     Tdap (Adacel,Boostrix) 10/15/2015     Zoster vaccine, live 10/29/2012     Above immunizations pulled from [employer]. [facility] and facility records also reconciled. Outstanding information sent to  to update [employer].  Future immunizations needed:  yearly influenza per facility protocol and Provider working with patient, first contact, and facility to administer immunizations.  MEDICATIONS:  Current Outpatient Prescriptions   Medication Sig Dispense Refill     Acetaminophen (TYLENOL PO) Take 650 mg by mouth every 4 hours as needed for mild pain or fever       AMOXICILLIN PO Give 2 gram by mouth as needed for Prophylaxis Dental Appointment       aspirin 81 MG chewable tablet Take 1 tablet (81 mg) by mouth daily 108 tablet 3     ORDER FOR DME Equipment being ordered: Walker Wheels () and Walker ()  Treatment Diagnosis: Impaired functional mobility tolerance and safety  "concerns with gait and transfer skills. 1 each 0     senna-docusate (SENOKOT-S;PERICOLACE) 8.6-50 MG per tablet Take 1 tablet by mouth daily as needed for constipation       trolamine salicylate (ASPERCREME) 10 % cream Apply topically 3 times daily as needed Apply to left knee        Medications reviewed:  Medications reconciled to facility chart and changes were made to reflect current medications as identified as above med list. Below are the changes that were made:   Medications stopped since last EPIC medication reconciliation:   There are no discontinued medications.    Medications started since last [facility] medication reconciliation:  No orders of the defined types were placed in this encounter.    Case Management:  I have reviewed the facility/SNF care plan/MDS which was done 7/11/18, including the falls risk, nutrition and pain screening. I also reviewed the current immunizations, and preventive care..Future cancer screening is not clinically indicated secondary to age/goals of care Patient's desire to return to the community is not assessible due to cognitive impairment. Current Level of Care is appropriate.    Advance Directive Discussion:    I reviewed the current advanced directives as reflected in EPIC, the POLST and the facility chart, and verified the congruency of orders. I contacted the first party Sarah  and left a message regarding the plan of Care.  I did not due to cognitive impairment review the advance directives with the resident.     Team Discussion:  I communicated with the appropriate disciplines involved with the Plan of Care:   Nursing      Patient Goal:  Patient's goal is pain control and comfort.    Information reviewed:  Medications, vital signs, orders, and nursing notes.    ROS:  4 point ROS including Respiratory, CV, GI and , other than that noted in the HPI,  is negative    Exam:  /76   Pulse 94   Temp 97.5  F (36.4  C)   Resp 18   Ht 1.702 m (5' 7\")   Wt 75 kg (165 lb " 6.4 oz)   SpO2 99%   BMI 25.91 kg/m    Body mass index is 25.91 kg/m .    GENERAL APPEARANCE:  Alert, in no distress, pleasant, cooperative.  EYES:  EOM, lids, pupils and irises normal, sclera clear and conjunctiva normal, no discharge or mattering on lids or lashes noted  ENT:  Mouth normal, moist mucous membranes, nose without drainage or crusting, external ears without lesions, hearing acuity intact  NECK: supple, symmetrical  RESP:  respiratory effort and palpation of chest normal, no chest wall tenderness, no respiratory distress, Lung sounds clear, patient is on room air  CV:  Palpation and auscultation of heart done, rate and rhythm regular, no murmur, no rub or gallop. Edema none in bilateral lower extremities.   ABDOMEN:  normal bowel sounds, soft, nontender.  M/S:   Gait and station ambulates independently with walker, no tenderness or swelling of the joints; able to move all extremities   SKIN:  Inspection and palpation of skin and subcutaneous tissue: skin warm, dry and intact without rashes  NEURO: cranial nerves 2-12 grossly intact, no facial asymmetry, no speech deficits and able to follow directions, moves all extremities symmetrically  PSYCH:  insight and judgement impaired, memory impaired, affect and mood normal      Lab/Diagnostic data:   CBC RESULTS:   Recent Labs   Lab Test 10/03/17 03/01/17   WBC  8.3  9.7   RBC  5.57*  5.60*   HGB  17.2*  16.8*   HCT  53.8*  53.7*   MCV  97  96   MCH  30.9  30.0   MCHC  32.0  31.3*   RDW  13.8  14.3   PLT  278  320     Lab Results   Component Value Date    WBC 7.3 09/25/2018     Lab Results   Component Value Date    RBC 5.34 09/25/2018     Lab Results   Component Value Date    HGB 15.9 09/25/2018     Lab Results   Component Value Date    HCT 49.8 09/25/2018     Lab Results   Component Value Date    MCV 93 09/25/2018     Lab Results   Component Value Date    MCH 29.8 09/25/2018     Lab Results   Component Value Date    MCHC 31.9 09/25/2018     Lab Results    Component Value Date    RDW 14.0 09/25/2018     Lab Results   Component Value Date     09/25/2018         Last Basic Metabolic Panel:  Recent Labs   Lab Test 10/03/17 03/01/17   NA  142  141   POTASSIUM  4.1  4.3   CHLORIDE  105  106   JANELLE  9.9  10.1   CO2  26  26   BUN  13  11   CR  0.84  0.90   GLC  66*  98     Last Comprehensive Metabolic Panel:  Sodium   Date Value Ref Range Status   09/25/2018 139 136 - 145 mmol/L Final     Potassium   Date Value Ref Range Status   09/25/2018 4.4 3.5 - 5.0 mmol/L Final     Chloride   Date Value Ref Range Status   09/25/2018 104 98 - 107 mmol/L Final     Carbon Dioxide   Date Value Ref Range Status   09/25/2018 26 22 - 31 mmol/L Final     Anion Gap   Date Value Ref Range Status   09/25/2018 9 5 - 18 mmol/L Final     Glucose   Date Value Ref Range Status   09/25/2018 76 70 - 125 mg/dL Final     Urea Nitrogen   Date Value Ref Range Status   09/25/2018 15 8 - 28 mg/dL Final     Creatinine   Date Value Ref Range Status   09/25/2018 0.80 0.60 - 1.10 mg/dL Final     GFR Estimate   Date Value Ref Range Status   09/25/2018 >60 >60 ml/min/1.73m2 Final     Calcium   Date Value Ref Range Status   09/25/2018 9.8 8.5 - 10.5 mg/dL Final         Liver Function Studies -   Recent Labs   Lab Test  01/11/14   1455   PROTTOTAL  6.9   ALBUMIN  3.8   BILITOTAL  0.5   ALKPHOS  63   AST  26   ALT  33       ASSESSMENT/PLAN  (D45) Primary polycythemia (H)  Comment: unknown cause, per chart review is not a candidate for work up. HGB 17.2 one year ago.   Plan: CBC every 6 months and PRN. Continue with asa 81 mg daily.    (M15.0) Primary osteoarthritis involving multiple joints  Comment: no pain today. Ambulating well with assistive device.    Plan: continue tylenol PRN and aspercreme for pain.     (G30.1,  F02.80) Late onset Alzheimer's disease without behavioral disturbance  Comment: chronic, progressive. Weights have been stable and she does not have sleep disturbance. Suspect that she may have  weight loss as the dementia progresses.   Plan: Board and care for assistance with meds and cares. Chronic, progressive. Needs 24 hour skilled nursing care. HPI/ROS difficult to obtain with cognitive impairment. Expect further functional and cognitive decline. Expect weight loss. Continue 24 hour care. Monitor weight. Monitor functional status. Monitor for behavioral disturbances.    (Z13.6) Screening for hypertension  Comment: Based on JNC-8 goals,  patients age of89 year old, no presence of diabetes or CKD, and goals of care goal BP is <150/90 mm Hg. Patient is stable and continue without pharmacological invention with routine assessment..      Orders:  BMP and CBC on next lab day    Electronically signed by:  GI Ash [facility]

## 2019-09-17 NOTE — LETTER
9/17/2019        RE: Lesvia Barney  [address] [address]  [facility] [address]        [facility]  Chief Complaint   Patient presents with     Annual Comprehensive Nursing Home     [place] Medical Record Number:  [id number]  Place of Service where encounter took place:  [facility] ([facility]) [[id number]]    HPI:    Lesvia Barney  is a 89 year old  (10/29/1928), who is being seen today for an annual comprehensive visit. HPI information obtained from: facility chart records, facility staff and patient report.      Today's concerns are:  Primary polycythemia (H)  Etiology unknown. 02 SAT:  95-99% on room air      Primary osteoarthritis involving multiple joints  Denies pain upon today's visit. Currently uses a walker for ambulation.     Late onset Alzheimer's disease without behavioral disturbance  BIMS 5/15. No behaviors noted by staff.   Wt Readings from Last 4 Encounters:   09/17/19 75 kg (165 lb 6.4 oz)   08/20/19 73.6 kg (162 lb 4.8 oz)   08/08/19 73.3 kg (161 lb 8 oz)   06/04/19 75.3 kg (165 lb 14.4 oz)   Reports she is sleeping, eating and going to the BR without difficulty.     Screening for hypertension  Based on JNC-8 goals,  patients age of 89 year old, no presence of diabetes or CKD, and goals of care goal BP is <150/90 mm Hg. Patient is stable and continue without pharmacological invention with routine assessment. Denies chest pain, syncope or shortness of breath.     BP Readings from Last 3 Encounters:   09/17/19 131/76   08/20/19 136/73   08/08/19 135/77     Pulse Readings from Last 4 Encounters:   09/17/19 94   08/20/19 82   08/08/19 70   06/04/19 69       ALLERGIES: No known allergies  PROBLEM LIST:  Patient Active Problem List   Diagnosis     Fall with injury     Femoral neck fracture (H)     HTN (hypertension)     Cognitive impairment     Avascular necrosis of bone of hip, right (H)     S/P prosthetic total arthroplasty of the hip     Late onset Alzheimer's disease  without behavioral disturbance     Primary osteoarthritis involving multiple joints     Primary polycythemia (H)     PAST MEDICAL HISTORY:  has a past medical history of HTN (hypertension). She also has no past medical history of Chronic infection; Malignant hyperthermia; or Sleep apnea.  PAST SURGICAL HISTORY:  has a past surgical history that includes appendectomy; Open reduction internal fixation hip nailing (1/12/2014); Open reduction internal fixation hip (unk, prior to 2014); Arthroplasty hip (Right, 8/31/2015); and Remove hardware hip nailing (Right, 8/31/2015).  FAMILY HISTORY: family history includes C.A.D. in her father; Prostate Cancer in her brother.  SOCIAL HISTORY:  reports that she has never smoked. She does not have any smokeless tobacco history on file. She reports that she drinks alcohol. She reports that she does not use illicit drugs.  IMMUNIZATIONS:  Most Recent Immunizations   Administered Date(s) Administered     Influenza (High Dose) 3 valent vaccine 10/03/2017     Influenza (IIV3) PF 10/20/2016     Pneumo Conj 13-V (2010&after) 10/15/2015     Pneumococcal 23 valent 11/09/1999     Tdap (Adacel,Boostrix) 10/15/2015     Zoster vaccine, live 10/29/2012     Above immunizations pulled from [employer]. [facility] and facility records also reconciled. Outstanding information sent to  to update [employer].  Future immunizations needed:  yearly influenza per facility protocol and Provider working with patient, first contact, and facility to administer immunizations.  MEDICATIONS:  Current Outpatient Prescriptions   Medication Sig Dispense Refill     Acetaminophen (TYLENOL PO) Take 650 mg by mouth every 4 hours as needed for mild pain or fever       AMOXICILLIN PO Give 2 gram by mouth as needed for Prophylaxis Dental Appointment       aspirin 81 MG chewable tablet Take 1 tablet (81 mg) by mouth daily 108 tablet 3     ORDER FOR DME Equipment being ordered: Walker Wheels () and  Walker ()  Treatment Diagnosis: Impaired functional mobility tolerance and safety concerns with gait and transfer skills. 1 each 0     senna-docusate (SENOKOT-S;PERICOLACE) 8.6-50 MG per tablet Take 1 tablet by mouth daily as needed for constipation       trolamine salicylate (ASPERCREME) 10 % cream Apply topically 3 times daily as needed Apply to left knee        Medications reviewed:  Medications reconciled to facility chart and changes were made to reflect current medications as identified as above med list. Below are the changes that were made:   Medications stopped since last EPIC medication reconciliation:   There are no discontinued medications.    Medications started since last [facility] medication reconciliation:  No orders of the defined types were placed in this encounter.    Case Management:  I have reviewed the facility/SNF care plan/MDS which was done 7/11/18, including the falls risk, nutrition and pain screening. I also reviewed the current immunizations, and preventive care..Future cancer screening is not clinically indicated secondary to age/goals of care Patient's desire to return to the community is not assessible due to cognitive impairment. Current Level of Care is appropriate.    Advance Directive Discussion:    I reviewed the current advanced directives as reflected in EPIC, the POLST and the facility chart, and verified the congruency of orders. I contacted the first party Sarah  and left a message regarding the plan of Care.  I did not due to cognitive impairment review the advance directives with the resident.     Team Discussion:  I communicated with the appropriate disciplines involved with the Plan of Care:   Nursing      Patient Goal:  Patient's goal is pain control and comfort.    Information reviewed:  Medications, vital signs, orders, and nursing notes.    ROS:  4 point ROS including Respiratory, CV, GI and , other than that noted in the HPI,  is negative    Exam:  /76    "Pulse 94   Temp 97.5  F (36.4  C)   Resp 18   Ht 1.702 m (5' 7\")   Wt 75 kg (165 lb 6.4 oz)   SpO2 99%   BMI 25.91 kg/m     Body mass index is 25.91 kg/m .    GENERAL APPEARANCE:  Alert, in no distress, pleasant, cooperative.  EYES:  EOM, lids, pupils and irises normal, sclera clear and conjunctiva normal, no discharge or mattering on lids or lashes noted  ENT:  Mouth normal, moist mucous membranes, nose without drainage or crusting, external ears without lesions, hearing acuity intact  NECK: supple, symmetrical  RESP:  respiratory effort and palpation of chest normal, no chest wall tenderness, no respiratory distress, Lung sounds clear, patient is on room air  CV:  Palpation and auscultation of heart done, rate and rhythm regular, no murmur, no rub or gallop. Edema none in bilateral lower extremities.   ABDOMEN:  normal bowel sounds, soft, nontender.  M/S:   Gait and station ambulates independently with walker, no tenderness or swelling of the joints; able to move all extremities   SKIN:  Inspection and palpation of skin and subcutaneous tissue: skin warm, dry and intact without rashes  NEURO: cranial nerves 2-12 grossly intact, no facial asymmetry, no speech deficits and able to follow directions, moves all extremities symmetrically  PSYCH:  insight and judgement impaired, memory impaired, affect and mood normal      Lab/Diagnostic data:   CBC RESULTS:   Recent Labs   Lab Test 10/03/17 03/01/17   WBC  8.3  9.7   RBC  5.57*  5.60*   HGB  17.2*  16.8*   HCT  53.8*  53.7*   MCV  97  96   MCH  30.9  30.0   MCHC  32.0  31.3*   RDW  13.8  14.3   PLT  278  320     Lab Results   Component Value Date    WBC 7.3 09/25/2018     Lab Results   Component Value Date    RBC 5.34 09/25/2018     Lab Results   Component Value Date    HGB 15.9 09/25/2018     Lab Results   Component Value Date    HCT 49.8 09/25/2018     Lab Results   Component Value Date    MCV 93 09/25/2018     Lab Results   Component Value Date    MCH 29.8 " 09/25/2018     Lab Results   Component Value Date    MCHC 31.9 09/25/2018     Lab Results   Component Value Date    RDW 14.0 09/25/2018     Lab Results   Component Value Date     09/25/2018         Last Basic Metabolic Panel:  Recent Labs   Lab Test 10/03/17 03/01/17   NA  142  141   POTASSIUM  4.1  4.3   CHLORIDE  105  106   JANELLE  9.9  10.1   CO2  26  26   BUN  13  11   CR  0.84  0.90   GLC  66*  98     Last Comprehensive Metabolic Panel:  Sodium   Date Value Ref Range Status   09/25/2018 139 136 - 145 mmol/L Final     Potassium   Date Value Ref Range Status   09/25/2018 4.4 3.5 - 5.0 mmol/L Final     Chloride   Date Value Ref Range Status   09/25/2018 104 98 - 107 mmol/L Final     Carbon Dioxide   Date Value Ref Range Status   09/25/2018 26 22 - 31 mmol/L Final     Anion Gap   Date Value Ref Range Status   09/25/2018 9 5 - 18 mmol/L Final     Glucose   Date Value Ref Range Status   09/25/2018 76 70 - 125 mg/dL Final     Urea Nitrogen   Date Value Ref Range Status   09/25/2018 15 8 - 28 mg/dL Final     Creatinine   Date Value Ref Range Status   09/25/2018 0.80 0.60 - 1.10 mg/dL Final     GFR Estimate   Date Value Ref Range Status   09/25/2018 >60 >60 ml/min/1.73m2 Final     Calcium   Date Value Ref Range Status   09/25/2018 9.8 8.5 - 10.5 mg/dL Final         Liver Function Studies -   Recent Labs   Lab Test  01/11/14   1455   PROTTOTAL  6.9   ALBUMIN  3.8   BILITOTAL  0.5   ALKPHOS  63   AST  26   ALT  33       ASSESSMENT/PLAN  (D45) Primary polycythemia (H)  Comment: unknown cause, per chart review is not a candidate for work up. HGB 17.2 one year ago.   Plan: CBC every 6 months and PRN. Continue with asa 81 mg daily.    (M15.0) Primary osteoarthritis involving multiple joints  Comment: no pain today. Ambulating well with assistive device.    Plan: continue tylenol PRN and aspercreme for pain.     (G30.1,  F02.80) Late onset Alzheimer's disease without behavioral disturbance  Comment: chronic, progressive.  Weights have been stable and she does not have sleep disturbance. Suspect that she may have weight loss as the dementia progresses.   Plan: Board and care for assistance with meds and cares. Chronic, progressive. Needs 24 hour skilled nursing care. HPI/ROS difficult to obtain with cognitive impairment. Expect further functional and cognitive decline. Expect weight loss. Continue 24 hour care. Monitor weight. Monitor functional status. Monitor for behavioral disturbances.    (Z13.6) Screening for hypertension  Comment: Based on JNC-8 goals,  patients age of89 year old, no presence of diabetes or CKD, and goals of care goal BP is <150/90 mm Hg. Patient is stable and continue without pharmacological invention with routine assessment..      Orders:  BMP and CBC on next lab day    Electronically signed by:  GI Ash [facility]             Sincerely,        Lexii Reid NP

## 2019-12-10 ENCOUNTER — NURSING HOME VISIT (OUTPATIENT)
Dept: GERIATRICS | Facility: CLINIC | Age: 84
End: 2019-12-10
Payer: COMMERCIAL

## 2019-12-10 VITALS
OXYGEN SATURATION: 97 % | TEMPERATURE: 97.8 F | WEIGHT: 164 LBS | SYSTOLIC BLOOD PRESSURE: 134 MMHG | DIASTOLIC BLOOD PRESSURE: 80 MMHG | HEIGHT: 67 IN | HEART RATE: 75 BPM | BODY MASS INDEX: 25.74 KG/M2 | RESPIRATION RATE: 18 BRPM

## 2019-12-10 DIAGNOSIS — M15.0 PRIMARY OSTEOARTHRITIS INVOLVING MULTIPLE JOINTS: Primary | ICD-10-CM

## 2019-12-10 DIAGNOSIS — D45 PRIMARY POLYCYTHEMIA (H): ICD-10-CM

## 2019-12-10 DIAGNOSIS — F02.80 LATE ONSET ALZHEIMER'S DISEASE WITHOUT BEHAVIORAL DISTURBANCE (H): ICD-10-CM

## 2019-12-10 DIAGNOSIS — G30.1 LATE ONSET ALZHEIMER'S DISEASE WITHOUT BEHAVIORAL DISTURBANCE (H): ICD-10-CM

## 2019-12-10 PROCEDURE — 99309 SBSQ NF CARE MODERATE MDM 30: CPT | Performed by: INTERNAL MEDICINE

## 2019-12-10 ASSESSMENT — MIFFLIN-ST. JEOR: SCORE: 1191.53

## 2019-12-10 NOTE — LETTER
"    12/10/2019        RE: Lesvia Barney  [address] [address]  [facility] [address]        Lesvia Barney is a 89 year old female seen December 10, 2019 at [facility] unit where she has resided for 4 years (admit 2015) seen to follow up progressive Alzheimer's dementia.       She is seen on the unit, up to chair enjoying a group activity.   Smiling and engaged, states she feels well    Nursing staff report general decline functionally, and patient refuses bathing or showers.    No longer wearing lipstick or quite as well put together as she used to be.       She has HTN by history, but sbps are currently stable without anti-hypertensive tx:  BP Readings from Last 3 Encounters:   12/10/19 134/80   19 131/76   19 136/73       She remains ambulatory with a 4WW but with significant deformity of LE joints, flexed at knees and hips, forward stoop.   Has become a difficult transfer.          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     Her brother  leaving her without any close kin.   A neighbor Sarah is her POA.     ROS:  +incontinence; BIMS 8/15  Wt Readings from Last 5 Encounters:   12/10/19 74.4 kg (164 lb)   19 75 kg (165 lb 6.4 oz)   19 73.6 kg (162 lb 4.8 oz)   19 73.3 kg (161 lb 8 oz)   19 75.3 kg (165 lb 14.4 oz)        EXAM:  NAD, alert and oriented x1  /80   Pulse 75   Temp 97.8  F (36.6  C)   Resp 18   Ht 1.702 m (5' 7\")   Wt 74.4 kg (164 lb)   SpO2 97%   BMI 25.69 kg/m      Neck supple without adenopathy  Lungs with decreased BS, no rales or wheeze  Heart RRR s1s2, without murmur  Abd soft, NT, no distention, +BS, no masses, no " rebound or guarding.     Ext trace ankle edema L>R, valgus deformity of left knee, and chronic hip flexion  Neuro: no tremor or stiffness, +wordfinding difficulty.  Psych: affect okay    Labs reviewed    IMP/PLAN:  (D45) Primary polycythemia (H)  Comment: hgb 15.9 on most recent check; no clear cause documented, no h/o lung disease or hypoxia  Plan: She is not a candidate for a workup  She is on daily aspirin 81 mg   Recheck WBC.        (M15.0) Primary osteoarthritis involving multiple joints   Comment: significant deforming changes of OA in LE joints, but no reports of pain.  Plan: Assist with transfers.   Ambulation with walker  Local measures and prn acetaminophen should she c/o pain.         (G30.1,  F02.80) Late onset Alzheimer's disease without behavioral disturbance  Comment:  gradual decline over past few years, now with loss of language and functional status  Plan: Board and Care for assist with meals, meds, cares, activity.      Add scheduled dose of trazodone prior to bathing and see if this helps.        Advanced directives: DNR/DNI.   Friend Sarah Irving is first contact.      Nabila Velasquez MD       Sincerely,        Nabila Velasquez MD

## 2019-12-17 NOTE — PROGRESS NOTES
"Lesvia Barney is a 89 year old female seen December 10, 2019 at [facility] unit where she has resided for 4 years (admit 2015) seen to follow up progressive Alzheimer's dementia.       She is seen on the unit, up to chair enjoying a group activity.   Smiling and engaged, states she feels well    Nursing staff report general decline functionally, and patient refuses bathing or showers.    No longer wearing lipstick or quite as well put together as she used to be.       She has HTN by history, but sbps are currently stable without anti-hypertensive tx:  BP Readings from Last 3 Encounters:   12/10/19 134/80   19 131/76   19 136/73       She remains ambulatory with a 4WW but with significant deformity of LE joints, flexed at knees and hips, forward stoop.   Has become a difficult transfer.          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     Her brother  leaving her without any close kin.   A neighbor Sarah is her POA.     ROS:  +incontinence; BIMS 8/15  Wt Readings from Last 5 Encounters:   12/10/19 74.4 kg (164 lb)   19 75 kg (165 lb 6.4 oz)   19 73.6 kg (162 lb 4.8 oz)   19 73.3 kg (161 lb 8 oz)   19 75.3 kg (165 lb 14.4 oz)        EXAM:  NAD, alert and oriented x1  /80   Pulse 75   Temp 97.8  F (36.6  C)   Resp 18   Ht 1.702 m (5' 7\")   Wt 74.4 kg (164 lb)   SpO2 97%   BMI 25.69 kg/m     Neck supple without adenopathy  Lungs with decreased BS, no rales or wheeze  Heart RRR s1s2, without murmur  Abd soft, NT, no distention, +BS, no masses, no rebound or guarding.     Ext trace ankle edema L>R, valgus deformity of left knee, and chronic " hip flexion  Neuro: no tremor or stiffness, +wordfinding difficulty.  Psych: affect okay    Labs reviewed    IMP/PLAN:  (D45) Primary polycythemia (H)  Comment: hgb 15.9 on most recent check; no clear cause documented, no h/o lung disease or hypoxia  Plan: She is not a candidate for a workup  She is on daily aspirin 81 mg   Recheck WBC.        (M15.0) Primary osteoarthritis involving multiple joints   Comment: significant deforming changes of OA in LE joints, but no reports of pain.  Plan: Assist with transfers.   Ambulation with walker  Local measures and prn acetaminophen should she c/o pain.         (G30.1,  F02.80) Late onset Alzheimer's disease without behavioral disturbance  Comment:  gradual decline over past few years, now with loss of language and functional status  Plan: Board and Care for assist with meals, meds, cares, activity.      Add scheduled dose of trazodone prior to bathing and see if this helps.        Advanced directives: DNR/DNI.   Friend Sarah Irving is first contact.      Nabila Velasquez MD

## 2020-02-04 ENCOUNTER — RECORDS - HEALTHEAST (OUTPATIENT)
Dept: LAB | Facility: CLINIC | Age: 85
End: 2020-02-04

## 2020-02-04 LAB
ALBUMIN UR-MCNC: ABNORMAL MG/DL
APPEARANCE UR: ABNORMAL
BACTERIA #/AREA URNS HPF: ABNORMAL HPF
BASOPHILS # BLD AUTO: 0 THOU/UL (ref 0–0.2)
BASOPHILS NFR BLD AUTO: 0 % (ref 0–2)
BILIRUB UR QL STRIP: NEGATIVE
COLOR UR AUTO: YELLOW
EOSINOPHIL # BLD AUTO: 0 THOU/UL (ref 0–0.4)
EOSINOPHIL NFR BLD AUTO: 1 % (ref 0–6)
ERYTHROCYTE [DISTWIDTH] IN BLOOD BY AUTOMATED COUNT: 14.4 % (ref 11–14.5)
GLUCOSE UR STRIP-MCNC: NEGATIVE MG/DL
HCT VFR BLD AUTO: 44.8 % (ref 35–47)
HGB BLD-MCNC: 14.3 G/DL (ref 12–16)
HGB UR QL STRIP: NEGATIVE
HYALINE CASTS #/AREA URNS LPF: ABNORMAL LPF
KETONES UR STRIP-MCNC: ABNORMAL MG/DL
LEUKOCYTE ESTERASE UR QL STRIP: ABNORMAL
LYMPHOCYTES # BLD AUTO: 1.6 THOU/UL (ref 0.8–4.4)
LYMPHOCYTES NFR BLD AUTO: 26 % (ref 20–40)
MCH RBC QN AUTO: 30 PG (ref 27–34)
MCHC RBC AUTO-ENTMCNC: 31.9 G/DL (ref 32–36)
MCV RBC AUTO: 94 FL (ref 80–100)
MONOCYTES # BLD AUTO: 1 THOU/UL (ref 0–0.9)
MONOCYTES NFR BLD AUTO: 16 % (ref 2–10)
MUCOUS THREADS #/AREA URNS LPF: ABNORMAL LPF
NEUTROPHILS # BLD AUTO: 3.6 THOU/UL (ref 2–7.7)
NEUTROPHILS NFR BLD AUTO: 57 % (ref 50–70)
NITRATE UR QL: POSITIVE
PH UR STRIP: 6 [PH] (ref 4.5–8)
PLATELET # BLD AUTO: 202 THOU/UL (ref 140–440)
PMV BLD AUTO: 9.6 FL (ref 8.5–12.5)
RBC # BLD AUTO: 4.77 MILL/UL (ref 3.8–5.4)
RBC #/AREA URNS AUTO: ABNORMAL HPF
SP GR UR STRIP: 1.02 (ref 1–1.03)
SQUAMOUS #/AREA URNS AUTO: ABNORMAL LPF
UROBILINOGEN UR STRIP-ACNC: ABNORMAL
WBC #/AREA URNS AUTO: >100 HPF
WBC CLUMPS #/AREA URNS HPF: PRESENT /[HPF]
WBC: 6.3 THOU/UL (ref 4–11)

## 2020-02-05 ENCOUNTER — RECORDS - HEALTHEAST (OUTPATIENT)
Dept: LAB | Facility: CLINIC | Age: 85
End: 2020-02-05

## 2020-02-05 ENCOUNTER — TELEPHONE (OUTPATIENT)
Dept: GERIATRICS | Facility: CLINIC | Age: 85
End: 2020-02-05

## 2020-02-05 ENCOUNTER — NURSING HOME VISIT (OUTPATIENT)
Dept: GERIATRICS | Facility: CLINIC | Age: 85
End: 2020-02-05
Payer: COMMERCIAL

## 2020-02-05 VITALS
OXYGEN SATURATION: 95 % | TEMPERATURE: 98.7 F | HEART RATE: 79 BPM | HEIGHT: 67 IN | BODY MASS INDEX: 26.27 KG/M2 | DIASTOLIC BLOOD PRESSURE: 73 MMHG | SYSTOLIC BLOOD PRESSURE: 117 MMHG | WEIGHT: 167.4 LBS | RESPIRATION RATE: 18 BRPM

## 2020-02-05 DIAGNOSIS — J11.00 PNEUMONIA OF LEFT LOWER LOBE DUE TO INFLUENZA A VIRUS: Primary | ICD-10-CM

## 2020-02-05 DIAGNOSIS — J90 PLEURAL EFFUSION: ICD-10-CM

## 2020-02-05 DIAGNOSIS — J10.1 INFLUENZA A: ICD-10-CM

## 2020-02-05 LAB
ANION GAP SERPL CALCULATED.3IONS-SCNC: 11 MMOL/L (ref 5–18)
BACTERIA SPEC CULT: NORMAL
BUN SERPL-MCNC: 12 MG/DL (ref 8–28)
CALCIUM SERPL-MCNC: 9.5 MG/DL (ref 8.5–10.5)
CHLORIDE BLD-SCNC: 106 MMOL/L (ref 98–107)
CO2 SERPL-SCNC: 26 MMOL/L (ref 22–31)
CREAT SERPL-MCNC: 0.83 MG/DL (ref 0.6–1.1)
FLUAV AG SPEC QL IA: ABNORMAL
FLUBV AG SPEC QL IA: ABNORMAL
GFR SERPL CREATININE-BSD FRML MDRD: >60 ML/MIN/1.73M2
GLUCOSE BLD-MCNC: 97 MG/DL (ref 70–125)
POTASSIUM BLD-SCNC: 4.4 MMOL/L (ref 3.5–5)
SODIUM SERPL-SCNC: 143 MMOL/L (ref 136–145)

## 2020-02-05 PROCEDURE — 99310 SBSQ NF CARE HIGH MDM 45: CPT | Performed by: NURSE PRACTITIONER

## 2020-02-05 PROCEDURE — 99207 ZZC CDG-CODE CATEGORY CHANGED: CPT | Performed by: NURSE PRACTITIONER

## 2020-02-05 RX ORDER — NYSTATIN 10B UNIT
POWDER (EA) MISCELLANEOUS
COMMUNITY
End: 2020-04-09 | Stop reason: CLARIF

## 2020-02-05 RX ORDER — FUROSEMIDE 20 MG
10 TABLET ORAL DAILY
Qty: 3 TABLET | Refills: 0 | Status: SHIPPED | OUTPATIENT
Start: 2020-02-05 | End: 2020-02-25 | Stop reason: CLARIF

## 2020-02-05 RX ORDER — AZITHROMYCIN 500 MG/1
500 TABLET, FILM COATED ORAL DAILY
COMMUNITY
End: 2020-02-25 | Stop reason: CLARIF

## 2020-02-05 ASSESSMENT — MIFFLIN-ST. JEOR: SCORE: 1206.95

## 2020-02-05 NOTE — LETTER
2/5/2020        RE: Lesvia Barney  [address] [address]  [facility] [address]        [facility]  Chief Complaint   Patient presents with     shelter Regulatory     [facility] Medical Record Number:  [id number]  Place of Service where encounter took [facility][id number]    HPI:    Lesvia Barney  is 89 year old (10/29/1928), who is being seen today for a federally mandated E/M visit.  HPI information obtained from: facility chart records, facility staff, patient report and [facility] chart review.     Today's concerns are:    ICD-10-CM    1. Pneumonia of left lower lobe due to influenza A virus J11.00    2. Pleural effusion J90    3. Influenza A J10.1      Resident's symptoms started on Monday; however had a fall Sunday night without injury. Chest x-ray demonstrated left lower lobe infiltrate and small left pleural effusion. Influenza swab not resulted until late 2/5/2020 but revealed resident is positive for influenza A.   -Continues to have low grade temp and weak. Stayed in bed throughout today. Was started on azithromycin on 2/4/2020. Denies SOB or CP.       ALLERGIES:No known allergies  PAST MEDICAL HISTORY:   has a past medical history of HTN (hypertension). She also has no past medical history of Chronic infection, Malignant hyperthermia, or Sleep apnea.  PAST SURGICAL HISTORY:   has a past surgical history that includes appendectomy; Open reduction internal fixation hip nailing (1/12/2014); Open reduction internal fixation hip (unk, prior to 2014); Arthroplasty hip (Right, 8/31/2015); and Remove hardware hip nailing (Right, 8/31/2015).  FAMILY HISTORY: family history includes C.A.D. in her father; Prostate Cancer in her brother.  SOCIAL HISTORY:  reports that she has never smoked. She does not have any smokeless tobacco history on file. She reports current alcohol use. She reports that she does not use drugs.    MEDICATIONS:  Current Outpatient  "Medications   Medication Sig Dispense Refill     Acetaminophen (TYLENOL PO) Take 650 mg by mouth every 4 hours as needed for mild pain or fever       AMOXICILLIN PO Give 2 gram by mouth as needed for Prophylaxis Dental Appointment       aspirin 81 MG chewable tablet Take 1 tablet (81 mg) by mouth daily 108 tablet 3     azithromycin (ZITHROMAX) 500 MG tablet Take 500 mg by mouth daily for left lower lobe infiltrate until 02/04/2020 AND Give 250 mg by mouth one time a day for left lower lobe infiltrate for 4 Days       nystatin POWD Apply to Under Breasts topically two times a day for Rash AND Apply to Under Breast topically as needed for Rash BID prn       senna-docusate (SENOKOT-S;PERICOLACE) 8.6-50 MG per tablet Take 1 tablet by mouth daily as needed for constipation       sodium chloride (OCEAN NASAL SPRAY) 0.65 % nasal spray Spray 1 spray into both nostrils every hour as needed for congestion for Nasal bleeding.       trolamine salicylate (ASPERCREME) 10 % cream Apply topically 3 times daily as needed Apply to left knee        Case Management:  I have reviewed the care plan and MDS and do agree with the plan. Patient's desire to return to the community is not assessible due to cognitive impairment. Information reviewed:  Medications, vital signs, orders, and nursing notes.    ROS:  Unobtainable secondary to cognitive impairment.     Vitals:  /73   Pulse 79   Temp 98.7  F (37.1  C)   Resp 18   Ht 1.702 m (5' 7\")   Wt 75.9 kg (167 lb 6.4 oz)   SpO2 95%   BMI 26.22 kg/m     Body mass index is 26.22 kg/m .  Exam:  GENERAL APPEARANCE:  Alert  ENT:  Mouth and posterior oropharynx normal, moist mucous membranes  RESP:  diminished breath sounds left mid and lower lobes  CV:  Palpation and auscultation of heart done , tachycardic 112  M/S:   Gait and station normal  Digits and nails normal  SKIN:  Inspection of skin and subcutaneous tissue baseline, Palpation of skin and subcutaneous tissue baseline  NEURO:   " Cranial nerves 2-12 are normal tested and grossly at patient's baseline  PSYCH:  insight and judgement impaired, memory impaired , affect and mood normal    Lab/Diagnostic data:   Labs done in SNF are in [facility]. Please refer to them using [employer]/Care Everywhere. and Recent labs in [facility] reviewed by me today.     ASSESSMENT/PLAN  (J11.00) Pneumonia of left lower lobe due to influenza A virus  (primary encounter diagnosis)  Comment: Acute pneumonia 2/2 influenza A. Was started on azithromycin on 2/4/2020. Unstable   Plan:   -Continue azithromycin until completed.   -Radiologist recommends f/u chest x-ray    (J90) Pleural effusion  Comment: Small effusion on left, unstable.   Plan:   -Lasix 10 mg/day x 5 days  -BMP Tuesday 2/11/2020  -Repeat chest x-ray one week    (J10.1) Influenza A  Comment: Signs and symptoms appeared Monday. No improvement seen. Resident has been resting in bed. Unstable   Plan:   -Stat BMP pending, previous lab work 2018  -Will dose Tamiflu per GFR      Orders written by provider at facility  -Stat GFR  -Tamiflu per resulted GFR  -Repeat chest x-ray one week   -BMP next Tuesday 2/11/2020  -Lasix 10 mg/dy x5 days.       Electronically signed by:  GI Ash CNP  [facility]       [facility]  Chief Complaint   Patient presents with     Cough     [facility] Medical Record Number:  [id number]  Place of Service where encounter took place:  [facility] ([facility]) [[id number]]    HPI:    Lesvia Barney  is 89 year old (10/29/1928), who is being seen today for a federally mandated E/M visit.  HPI information obtained from: facility chart records, facility staff, patient report and [facility] chart review.     Today's concerns are:    ICD-10-CM    1. Pneumonia of left lower lobe due to influenza A virus J11.00    2. Pleural effusion J90 furosemide (LASIX) 20 MG tablet   3. Influenza A J10.1      Resident's symptoms started on Monday; however had a  fall Sunday night without injury. Chest x-ray demonstrated left lower lobe infiltrate and small left pleural effusion. Influenza swab not resulted until late 2/5/2020 but revealed resident is positive for influenza A.   -Continues to have low grade temp and weak. Stayed in bed throughout today. Was started on azithromycin on 2/4/2020. Denies SOB or CP.       ALLERGIES:No known allergies  PAST MEDICAL HISTORY:   has a past medical history of HTN (hypertension). She also has no past medical history of Chronic infection, Malignant hyperthermia, or Sleep apnea.  PAST SURGICAL HISTORY:   has a past surgical history that includes appendectomy; Open reduction internal fixation hip nailing (1/12/2014); Open reduction internal fixation hip (unk, prior to 2014); Arthroplasty hip (Right, 8/31/2015); and Remove hardware hip nailing (Right, 8/31/2015).  FAMILY HISTORY: family history includes C.A.D. in her father; Prostate Cancer in her brother.  SOCIAL HISTORY:  reports that she has never smoked. She does not have any smokeless tobacco history on file. She reports current alcohol use. She reports that she does not use drugs.    MEDICATIONS:  Current Outpatient Medications   Medication Sig Dispense Refill     Acetaminophen (TYLENOL PO) Take 650 mg by mouth every 4 hours as needed for mild pain or fever       AMOXICILLIN PO Give 2 gram by mouth as needed for Prophylaxis Dental Appointment       aspirin 81 MG chewable tablet Take 1 tablet (81 mg) by mouth daily 108 tablet 3     azithromycin (ZITHROMAX) 500 MG tablet Take 500 mg by mouth daily for left lower lobe infiltrate until 02/04/2020 AND Give 250 mg by mouth one time a day for left lower lobe infiltrate for 4 Days       furosemide (LASIX) 20 MG tablet Take 0.5 tablets (10 mg) by mouth daily 3 tablet 0     nystatin POWD Apply to Under Breasts topically two times a day for Rash AND Apply to Under Breast topically as needed for Rash BID prn       senna-docusate  "(SENOKOT-S;PERICOLACE) 8.6-50 MG per tablet Take 1 tablet by mouth daily as needed for constipation       sodium chloride (OCEAN NASAL SPRAY) 0.65 % nasal spray Spray 1 spray into both nostrils every hour as needed for congestion for Nasal bleeding.       trolamine salicylate (ASPERCREME) 10 % cream Apply topically 3 times daily as needed Apply to left knee        Case Management:  I have reviewed the care plan and MDS and do agree with the plan. Patient's desire to return to the community is not assessible due to cognitive impairment. Information reviewed:  Medications, vital signs, orders, and nursing notes.    ROS:  Unobtainable secondary to cognitive impairment.     Vitals:  /73   Pulse 79   Temp 98.7  F (37.1  C)   Resp 18   Ht 1.702 m (5' 7\")   Wt 75.9 kg (167 lb 6.4 oz)   SpO2 95%   BMI 26.22 kg/m     Body mass index is 26.22 kg/m .  Exam:  GENERAL APPEARANCE:  Alert  ENT:  Mouth and posterior oropharynx normal, moist mucous membranes  RESP:  diminished breath sounds left mid and lower lobes  CV:  Palpation and auscultation of heart done , tachycardic 112  M/S:   Gait and station normal  Digits and nails normal  SKIN:  Inspection of skin and subcutaneous tissue baseline, Palpation of skin and subcutaneous tissue baseline  NEURO:   Cranial nerves 2-12 are normal tested and grossly at patient's baseline  PSYCH:  insight and judgement impaired, memory impaired , affect and mood normal    Lab/Diagnostic data:   Labs done in SNF are in [facility]. Please refer to them using [employer]/Care Everywhere. and Recent labs in [facility] reviewed by me today.     ASSESSMENT/PLAN  (J11.00) Pneumonia of left lower lobe due to influenza A virus  (primary encounter diagnosis)  Comment: Acute pneumonia 2/2 influenza A. Was started on azithromycin on 2/4/2020. Unstable   Plan:   -Continue azithromycin until completed.   -Radiologist recommends f/u chest x-ray    (J90) Pleural effusion  Comment: Small effusion on left, " unstable.   Plan:   -Lasix 10 mg/day x 5 days  -BMP Tuesday 2/11/2020  -Repeat chest x-ray one week    (J10.1) Influenza A  Comment: Signs and symptoms appeared Monday. No improvement seen. Resident has been resting in bed. Unstable   Plan:   -Stat BMP pending, previous lab work 2018  -Will dose Tamiflu per GFR      Orders written by provider at facility  -Stat GFR  -Tamiflu per resulted GFR  -Repeat chest x-ray one week   -BMP next Tuesday 2/11/2020  -Lasix 10 mg/dy x5 days.       Electronically signed by:  IG Ash [facility]         Sincerely,        Lexii Reid, NP

## 2020-02-05 NOTE — PROGRESS NOTES
[facility]  Chief Complaint   Patient presents with     Cough     [facility] Medical Record Number:  [id number]  Place of Service where encounter took place:  [facility] ([facility]) [[id number]]    HPI:    Lesvia Barney  is 89 year old (10/29/1928), who is being seen today for a federally mandated E/M visit.  HPI information obtained from: facility chart records, facility staff, patient report and [facility] chart review.     Today's concerns are:    ICD-10-CM    1. Pneumonia of left lower lobe due to influenza A virus J11.00    2. Pleural effusion J90 furosemide (LASIX) 20 MG tablet   3. Influenza A J10.1      Resident's symptoms started on Monday; however had a fall Sunday night without injury. Chest x-ray demonstrated left lower lobe infiltrate and small left pleural effusion. Influenza swab not resulted until late 2/5/2020 but revealed resident is positive for influenza A.   -Continues to have low grade temp and weak. Stayed in bed throughout today. Was started on azithromycin on 2/4/2020. Denies SOB or CP.       ALLERGIES:No known allergies  PAST MEDICAL HISTORY:   has a past medical history of HTN (hypertension). She also has no past medical history of Chronic infection, Malignant hyperthermia, or Sleep apnea.  PAST SURGICAL HISTORY:   has a past surgical history that includes appendectomy; Open reduction internal fixation hip nailing (1/12/2014); Open reduction internal fixation hip (unk, prior to 2014); Arthroplasty hip (Right, 8/31/2015); and Remove hardware hip nailing (Right, 8/31/2015).  FAMILY HISTORY: family history includes C.A.D. in her father; Prostate Cancer in her brother.  SOCIAL HISTORY:  reports that she has never smoked. She does not have any smokeless tobacco history on file. She reports current alcohol use. She reports that she does not use drugs.    MEDICATIONS:  Current Outpatient Medications   Medication Sig Dispense Refill     Acetaminophen (TYLENOL PO) Take  "650 mg by mouth every 4 hours as needed for mild pain or fever       AMOXICILLIN PO Give 2 gram by mouth as needed for Prophylaxis Dental Appointment       aspirin 81 MG chewable tablet Take 1 tablet (81 mg) by mouth daily 108 tablet 3     azithromycin (ZITHROMAX) 500 MG tablet Take 500 mg by mouth daily for left lower lobe infiltrate until 02/04/2020 AND Give 250 mg by mouth one time a day for left lower lobe infiltrate for 4 Days       furosemide (LASIX) 20 MG tablet Take 0.5 tablets (10 mg) by mouth daily 3 tablet 0     nystatin POWD Apply to Under Breasts topically two times a day for Rash AND Apply to Under Breast topically as needed for Rash BID prn       senna-docusate (SENOKOT-S;PERICOLACE) 8.6-50 MG per tablet Take 1 tablet by mouth daily as needed for constipation       sodium chloride (OCEAN NASAL SPRAY) 0.65 % nasal spray Spray 1 spray into both nostrils every hour as needed for congestion for Nasal bleeding.       trolamine salicylate (ASPERCREME) 10 % cream Apply topically 3 times daily as needed Apply to left knee        Case Management:  I have reviewed the care plan and MDS and do agree with the plan. Patient's desire to return to the community is not assessible due to cognitive impairment. Information reviewed:  Medications, vital signs, orders, and nursing notes.    ROS:  Unobtainable secondary to cognitive impairment.     Vitals:  /73   Pulse 79   Temp 98.7  F (37.1  C)   Resp 18   Ht 1.702 m (5' 7\")   Wt 75.9 kg (167 lb 6.4 oz)   SpO2 95%   BMI 26.22 kg/m    Body mass index is 26.22 kg/m .  Exam:  GENERAL APPEARANCE:  Alert  ENT:  Mouth and posterior oropharynx normal, moist mucous membranes  RESP:  diminished breath sounds left mid and lower lobes  CV:  Palpation and auscultation of heart done , tachycardic 112  M/S:   Gait and station normal  Digits and nails normal  SKIN:  Inspection of skin and subcutaneous tissue baseline, Palpation of skin and subcutaneous tissue " baseline  NEURO:   Cranial nerves 2-12 are normal tested and grossly at patient's baseline  PSYCH:  insight and judgement impaired, memory impaired , affect and mood normal    Lab/Diagnostic data:   Labs done in SNF are in [facility]. Please refer to them using [facility]/Care Everywhere. and Recent labs in [facility] reviewed by me today.     ASSESSMENT/PLAN  (J11.00) Pneumonia of left lower lobe due to influenza A virus  (primary encounter diagnosis)  Comment: Acute pneumonia 2/2 influenza A. Was started on azithromycin on 2/4/2020. Unstable   Plan:   -Continue azithromycin until completed.   -Radiologist recommends f/u chest x-ray    (J90) Pleural effusion  Comment: Small effusion on left, unstable.   Plan:   -Lasix 10 mg/day x 5 days  -BMP Tuesday 2/11/2020  -Repeat chest x-ray one week    (J10.1) Influenza A  Comment: Signs and symptoms appeared Monday. No improvement seen. Resident has been resting in bed. Unstable   Plan:   -Stat BMP pending, previous lab work 2018  -Will dose Tamiflu per GFR      Orders written by provider at facility  -Stat GFR  -Tamiflu per resulted GFR  -Repeat chest x-ray one week   -BMP next Tuesday 2/11/2020  -Lasix 10 mg/dy x5 days.       Electronically signed by:  GI Ash CNP  [facility]

## 2020-02-06 NOTE — TELEPHONE ENCOUNTER
Patient tested positive for influenza A earlier today, [facility] ordered for GFR to for Tamilfu dosing. Nursing called with results: GFR >60, creatinine 0.83      Tamiflu 75mg po BID x 5 days    GI Mc CNP

## 2020-02-10 ENCOUNTER — RECORDS - HEALTHEAST (OUTPATIENT)
Dept: LAB | Facility: CLINIC | Age: 85
End: 2020-02-10

## 2020-02-11 LAB
ANION GAP SERPL CALCULATED.3IONS-SCNC: 10 MMOL/L (ref 5–18)
BUN SERPL-MCNC: 13 MG/DL (ref 8–28)
CALCIUM SERPL-MCNC: 9.6 MG/DL (ref 8.5–10.5)
CHLORIDE BLD-SCNC: 107 MMOL/L (ref 98–107)
CO2 SERPL-SCNC: 26 MMOL/L (ref 22–31)
CREAT SERPL-MCNC: 0.78 MG/DL (ref 0.6–1.1)
GFR SERPL CREATININE-BSD FRML MDRD: >60 ML/MIN/1.73M2
GLUCOSE BLD-MCNC: 65 MG/DL (ref 70–125)
POTASSIUM BLD-SCNC: 4.3 MMOL/L (ref 3.5–5)
SODIUM SERPL-SCNC: 143 MMOL/L (ref 136–145)

## 2020-02-13 ENCOUNTER — NURSING HOME VISIT (OUTPATIENT)
Dept: GERIATRICS | Facility: CLINIC | Age: 85
End: 2020-02-13
Payer: COMMERCIAL

## 2020-02-13 VITALS
BODY MASS INDEX: 26.27 KG/M2 | SYSTOLIC BLOOD PRESSURE: 132 MMHG | RESPIRATION RATE: 18 BRPM | OXYGEN SATURATION: 65 % | HEART RATE: 76 BPM | TEMPERATURE: 98 F | HEIGHT: 67 IN | WEIGHT: 167.4 LBS | DIASTOLIC BLOOD PRESSURE: 70 MMHG

## 2020-02-13 DIAGNOSIS — F02.80 LATE ONSET ALZHEIMER'S DISEASE WITHOUT BEHAVIORAL DISTURBANCE (H): ICD-10-CM

## 2020-02-13 DIAGNOSIS — J10.1 INFLUENZA A: Primary | ICD-10-CM

## 2020-02-13 DIAGNOSIS — J90 PLEURAL EFFUSION: ICD-10-CM

## 2020-02-13 DIAGNOSIS — G30.1 LATE ONSET ALZHEIMER'S DISEASE WITHOUT BEHAVIORAL DISTURBANCE (H): ICD-10-CM

## 2020-02-13 DIAGNOSIS — J11.00 PNEUMONIA OF LEFT LOWER LOBE DUE TO INFLUENZA A VIRUS: ICD-10-CM

## 2020-02-13 PROCEDURE — 99207 ZZC CDG-CODE CATEGORY CHANGED: CPT | Performed by: NURSE PRACTITIONER

## 2020-02-13 PROCEDURE — 99308 SBSQ NF CARE LOW MDM 20: CPT | Performed by: NURSE PRACTITIONER

## 2020-02-13 ASSESSMENT — MIFFLIN-ST. JEOR: SCORE: 1206.95

## 2020-02-13 NOTE — PROGRESS NOTES
[place] [facility]  [place] Medical Record Number:  [id number]  Place of Service where encounter took place:  [facility] (FGS) [[id number]]  Chief Complaint   Patient presents with     Nursing Home Acute       HPI:    Lesvia Barney  is a 89 year old (10/29/1928), who is being seen today for an episodic care visit.  HPI information obtained from: facility chart records, facility staff and patient report. Today's concern is:    ICD-10-CM    1. Influenza A J10.1    2. Pleural effusion J90    3. Pneumonia of left lower lobe due to influenza A virus J11.00    4. Late onset Alzheimer's disease without behavioral disturbance (H) G30.1     F02.80      Resident sitting in room. Recent diagnosis of Influenza, LLL pleural effusion and pneumonia.   -Completed Tamiflu on 2/11  -Completed azithromycin 2/9  -Completed Lasix 10 mg/one time only   -Denies SOB or lightheadedness. No cough noted upon today's exam. Does reports she is weak and not walking as much lately. No pain noted.   -O2 95% on RA    BP Readings from Last 3 Encounters:   02/13/20 132/70   02/05/20 117/73   12/10/19 134/80     Pulse Readings from Last 4 Encounters:   02/13/20 76   02/05/20 79   12/10/19 75   09/17/19 94     Wt Readings from Last 4 Encounters:   02/13/20 75.9 kg (167 lb 6.4 oz)   02/05/20 75.9 kg (167 lb 6.4 oz)   12/10/19 74.4 kg (164 lb)   09/17/19 75 kg (165 lb 6.4 oz)         Past Medical and Surgical History reviewed in Epic today.    MEDICATIONS:  Current Outpatient Medications   Medication Sig Dispense Refill     Acetaminophen (TYLENOL PO) Take 650 mg by mouth every 4 hours as needed for mild pain or fever       AMOXICILLIN PO Give 2 gram by mouth as needed for Prophylaxis Dental Appointment       aspirin 81 MG chewable tablet Take 1 tablet (81 mg) by mouth daily 108 tablet 3     nystatin POWD Apply to Under Breasts topically two times a day for Rash AND Apply to Under Breast topically as needed for Rash BID prn        "senna-docusate (SENOKOT-S;PERICOLACE) 8.6-50 MG per tablet Take 1 tablet by mouth daily as needed for constipation       sodium chloride (OCEAN NASAL SPRAY) 0.65 % nasal spray Spray 1 spray into both nostrils every hour as needed for congestion for Nasal bleeding.       trolamine salicylate (ASPERCREME) 10 % cream Apply topically 3 times daily as needed Apply to left knee        azithromycin (ZITHROMAX) 500 MG tablet Take 500 mg by mouth daily for left lower lobe infiltrate until 02/04/2020 AND Give 250 mg by mouth one time a day for left lower lobe infiltrate for 4 Days       furosemide (LASIX) 20 MG tablet Take 0.5 tablets (10 mg) by mouth daily (Patient not taking: Reported on 2/13/2020) 3 tablet 0     REVIEW OF SYSTEMS:  Unobtainable secondary to cognitive impairment.     Objective:  /70   Pulse 76   Temp 98  F (36.7  C)   Resp 18   Ht 1.702 m (5' 7\")   Wt 75.9 kg (167 lb 6.4 oz)   SpO2 (!) 65%   BMI 26.22 kg/m    Exam:  GENERAL APPEARANCE:  Alert, in no distress  ENT:  Mouth and posterior oropharynx normal, moist mucous membranes, normal hearing acuity  RESP:  respiratory effort and palpation of chest normal, lungs clear to auscultation , no respiratory distress  CV:  Palpation and auscultation of heart done , regular rate and rhythm, no murmur, rub, or gallop  SKIN:  Inspection of skin and subcutaneous tissue baseline, Palpation of skin and subcutaneous tissue baseline  NEURO:   Cranial nerves 2-12 are normal tested and grossly at patient's baseline  PSYCH:  insight and judgement impaired, memory impaired , affect and mood normal    Labs:   [facility]. Please refer to them using EPIC/Care Everywhere. and Recent labs in EPIC reviewed by me today.     ASSESSMENT/PLAN:  (J10.1) Influenza A  (primary encounter diagnosis)  Comment: resolved  Plan: Monitor and update NP with changes.     (J90) Pleural effusion  Comment: Per chest x-ray. Received one time dose lasix 10 mg. " Remains asymptomatic.   Plan: Monitor and update NP with changes.     (J11.00) Pneumonia of left lower lobe due to influenza A virus  Comment: Resolved.   Plan: Monitor and update NP with changes.     (G30.1,  F02.80) Late onset Alzheimer's disease without behavioral disturbance (H)  Comment: Progressing.   Plan: Resides on secure dementia unit with assistance for ADLs and meals.    The current medical regimen is effective; continue present plan and medications.    Electronically signed by:  GI Ash CNP  [facility]

## 2020-02-13 NOTE — LETTER
2/13/2020        RE: Lesvia Barney  [address] [address]  [facility] [address]        [place] [facility]  [place] Medical Record Number:  [id number]  Place of Service where encounter took place:  [facility] (FGS) [[id number]]  Chief Complaint   Patient presents with     Nursing Home Acute       HPI:    Lesvia Barney  is a 89 year old (10/29/1928), who is being seen today for an episodic care visit.  HPI information obtained from: facility chart records, facility staff and patient report. Today's concern is:    ICD-10-CM    1. Influenza A J10.1    2. Pleural effusion J90    3. Pneumonia of left lower lobe due to influenza A virus J11.00    4. Late onset Alzheimer's disease without behavioral disturbance (H) G30.1     F02.80      Resident sitting in room. Recent diagnosis of Influenza, LLL pleural effusion and pneumonia.   -Completed Tamiflu on 2/11  -Completed azithromycin 2/9  -Completed Lasix 10 mg/one time only   -Denies SOB or lightheadedness. No cough noted upon today's exam. Does reports she is weak and not walking as much lately. No pain noted.   -O2 95% on RA    BP Readings from Last 3 Encounters:   02/13/20 132/70   02/05/20 117/73   12/10/19 134/80     Pulse Readings from Last 4 Encounters:   02/13/20 76   02/05/20 79   12/10/19 75   09/17/19 94     Wt Readings from Last 4 Encounters:   02/13/20 75.9 kg (167 lb 6.4 oz)   02/05/20 75.9 kg (167 lb 6.4 oz)   12/10/19 74.4 kg (164 lb)   09/17/19 75 kg (165 lb 6.4 oz)         Past Medical and Surgical History reviewed in Epic today.    MEDICATIONS:  Current Outpatient Medications   Medication Sig Dispense Refill     Acetaminophen (TYLENOL PO) Take 650 mg by mouth every 4 hours as needed for mild pain or fever       AMOXICILLIN PO Give 2 gram by mouth as needed for Prophylaxis Dental Appointment       aspirin 81 MG chewable tablet Take 1 tablet (81 mg) by mouth daily 108 tablet 3     nystatin POWD Apply to Under Breasts topically two  "times a day for Rash AND Apply to Under Breast topically as needed for Rash BID prn       senna-docusate (SENOKOT-S;PERICOLACE) 8.6-50 MG per tablet Take 1 tablet by mouth daily as needed for constipation       sodium chloride (OCEAN NASAL SPRAY) 0.65 % nasal spray Spray 1 spray into both nostrils every hour as needed for congestion for Nasal bleeding.       trolamine salicylate (ASPERCREME) 10 % cream Apply topically 3 times daily as needed Apply to left knee        azithromycin (ZITHROMAX) 500 MG tablet Take 500 mg by mouth daily for left lower lobe infiltrate until 02/04/2020 AND Give 250 mg by mouth one time a day for left lower lobe infiltrate for 4 Days       furosemide (LASIX) 20 MG tablet Take 0.5 tablets (10 mg) by mouth daily (Patient not taking: Reported on 2/13/2020) 3 tablet 0     REVIEW OF SYSTEMS:  Unobtainable secondary to cognitive impairment.     Objective:  /70   Pulse 76   Temp 98  F (36.7  C)   Resp 18   Ht 1.702 m (5' 7\")   Wt 75.9 kg (167 lb 6.4 oz)   SpO2 (!) 65%   BMI 26.22 kg/m     Exam:  GENERAL APPEARANCE:  Alert, in no distress  ENT:  Mouth and posterior oropharynx normal, moist mucous membranes, normal hearing acuity  RESP:  respiratory effort and palpation of chest normal, lungs clear to auscultation , no respiratory distress  CV:  Palpation and auscultation of heart done , regular rate and rhythm, no murmur, rub, or gallop  SKIN:  Inspection of skin and subcutaneous tissue baseline, Palpation of skin and subcutaneous tissue baseline  NEURO:   Cranial nerves 2-12 are normal tested and grossly at patient's baseline  PSYCH:  insight and judgement impaired, memory impaired , affect and mood normal    Labs:   Labs done in SNF are in [facility]. Please refer to them using EPIC/Care Everywhere. and Recent labs in EPIC reviewed by me today.     ASSESSMENT/PLAN:  (J10.1) Influenza A  (primary encounter diagnosis)  Comment: resolved  Plan: Monitor and update NP with changes. "     (J90) Pleural effusion  Comment: Per chest x-ray. Received one time dose lasix 10 mg. Remains asymptomatic.   Plan: Monitor and update NP with changes.     (J11.00) Pneumonia of left lower lobe due to influenza A virus  Comment: Resolved.   Plan: Monitor and update NP with changes.     (G30.1,  F02.80) Late onset Alzheimer's disease without behavioral disturbance (H)  Comment: Progressing.   Plan: Resides on secure dementia unit with assistance for ADLs and meals.    The current medical regimen is effective; continue present plan and medications.    Electronically signed by:  GI Ash [facility]         Sincerely,        Lexii Reid, NP

## 2020-02-18 ENCOUNTER — CLINICAL UPDATE (OUTPATIENT)
Dept: PHARMACY | Facility: CLINIC | Age: 85
End: 2020-02-18
Payer: COMMERCIAL

## 2020-02-18 DIAGNOSIS — M15.0 PRIMARY OSTEOARTHRITIS INVOLVING MULTIPLE JOINTS: ICD-10-CM

## 2020-02-18 DIAGNOSIS — F02.80 LATE ONSET ALZHEIMER'S DISEASE WITHOUT BEHAVIORAL DISTURBANCE (H): ICD-10-CM

## 2020-02-18 DIAGNOSIS — D45 PRIMARY POLYCYTHEMIA (H): Primary | ICD-10-CM

## 2020-02-18 DIAGNOSIS — G30.1 LATE ONSET ALZHEIMER'S DISEASE WITHOUT BEHAVIORAL DISTURBANCE (H): ICD-10-CM

## 2020-02-18 PROCEDURE — 99207 ZZC NO CHARGE LOS: CPT | Performed by: PHARMACIST

## 2020-02-18 NOTE — PROGRESS NOTES
This patient's medication list and chart were reviewed as part of the service provided by [facility].    Assessment/Recommendations:    No recommendations for changes at this time.    Meena Lerner, Pharm.D.,[facility]  Board Certified Geriatric Pharmacist  Medication Therapy Management Pharmacist  [phone]

## 2020-04-02 ENCOUNTER — VIRTUAL VISIT (OUTPATIENT)
Dept: GERIATRICS | Facility: CLINIC | Age: 85
End: 2020-04-02
Payer: COMMERCIAL

## 2020-04-02 VITALS
OXYGEN SATURATION: 96 % | WEIGHT: 162.5 LBS | BODY MASS INDEX: 25.51 KG/M2 | RESPIRATION RATE: 18 BRPM | HEIGHT: 67 IN | TEMPERATURE: 98.5 F | DIASTOLIC BLOOD PRESSURE: 71 MMHG | HEART RATE: 78 BPM | SYSTOLIC BLOOD PRESSURE: 142 MMHG

## 2020-04-02 DIAGNOSIS — M15.0 PRIMARY OSTEOARTHRITIS INVOLVING MULTIPLE JOINTS: ICD-10-CM

## 2020-04-02 DIAGNOSIS — G30.1 LATE ONSET ALZHEIMER'S DISEASE WITHOUT BEHAVIORAL DISTURBANCE (H): Primary | ICD-10-CM

## 2020-04-02 DIAGNOSIS — I10 ESSENTIAL HYPERTENSION: ICD-10-CM

## 2020-04-02 DIAGNOSIS — F02.80 LATE ONSET ALZHEIMER'S DISEASE WITHOUT BEHAVIORAL DISTURBANCE (H): Primary | ICD-10-CM

## 2020-04-02 PROCEDURE — 99309 SBSQ NF CARE MODERATE MDM 30: CPT | Mod: GT | Performed by: NURSE PRACTITIONER

## 2020-04-02 ASSESSMENT — MIFFLIN-ST. JEOR: SCORE: 1184.73

## 2020-04-02 NOTE — LETTER
"    4/2/2020        RE: Lesvia Barney  [address] [address]  [facility] [address]        [place] [facility] E-VISIT   Lesvia Barney is being evaluated via a billable video visit due to the restrictions of the Covid-19 pandemic.   The patient has been notified of following:  \"This video visit will be conducted via a call between you and your provider. We have found that certain health care needs can be provided without the need for an in-person physical exam.  This service lets us provide the care you need with a video conversation. If during the course of the call the provider feels a video visit is not appropriate, you will not be charged for this service.\"   The provider has received verbal consent for a Video Visit from the patient or first contact? No  Patient  or facility staff would like the video invitation sent by: N/A   Video Start Time:1600  [facility] Medical Record Number:  [id number]  Place of Location at the time of visit: [facility]   Chief Complaint   Patient presents with     Nursing Home Acute     HPI:  Lesvia Barney  is a 89 year old (10/29/1928), who is being seen today for an E-visit.  HPI information obtained from: facility chart records, facility staff and patient report.     Today's concern is:   Diagnosis Comments   1. Late onset Alzheimer's disease without behavioral disturbance (H)  Resident doing well. Up in hallway reading magazine. No behavioral or mood concerns per staff.    2. Primary osteoarthritis involving multiple joints  No indications of acute pain. Currently have PRN acetaminophen available.    3. Essential hypertension  BP Readings from Last 3 Encounters:   04/02/20 (!) 142/71   02/13/20 132/70   02/05/20 117/73   No indications of CP or SOB. No recent falls.          Past Medical and Surgical History reviewed in Epic today.  MEDICATIONS:    Current Outpatient Medications   Medication Sig Dispense Refill     Acetaminophen (TYLENOL PO) Take 650 mg by mouth " "every 4 hours as needed for mild pain or fever       AMOXICILLIN PO Give 2 gram by mouth as needed for Prophylaxis Dental Appointment       aspirin 81 MG chewable tablet Take 1 tablet (81 mg) by mouth daily 108 tablet 3     senna-docusate (SENOKOT-S;PERICOLACE) 8.6-50 MG per tablet Take 1 tablet by mouth daily as needed for constipation       sodium chloride (OCEAN NASAL SPRAY) 0.65 % nasal spray Spray 1 spray into both nostrils every hour as needed for congestion for Nasal bleeding.       trolamine salicylate (ASPERCREME) 10 % cream Apply topically 3 times daily as needed Apply to left knee        nystatin POWD Apply to Under Breasts topically two times a day for Rash AND Apply to Under Breast topically as needed for Rash BID prn       REVIEW OF SYSTEMS: Unobtainable secondary to cognitive impairment.   Objective: BP (!) 142/71   Pulse 78   Temp 98.5  F (36.9  C)   Resp 18   Ht 1.702 m (5' 7\")   Wt 73.7 kg (162 lb 8 oz)   SpO2 96%   BMI 25.45 kg/m     E-visit exam done given COVID-19 precautions.   GENERAL APPEARANCE:  Alert  RESP:  no respiratory distress  CV:  no edema  M/S:   Gait and station normal  Digits and nails normal  SKIN:  Inspection of skin and subcutaneous tissue baseline, Palpation of skin and subcutaneous tissue baseline  NEURO:   Cranial nerves 2-12 are normal tested and grossly at patient's baseline  Labs:   Labs done in SNF are in [facility]. Please refer to them using EPIC/Care Everywhere. and Recent labs in EPIC reviewed by me today.     ASSESSMENT/PLAN:  (G30.1,  F02.80) Late onset Alzheimer's disease without behavioral disturbance (H)  (primary encounter diagnosis)  Resident doing well on Board and Care memory care unit. No behaviors noted. Weight is stable and without recent fall.  Plan:   -Continue Board and Care assist with medication administration, meals, and ADLs.   -Expect further decline with progression of disease. Monitor weight, behavior and mood and update NP with " changes.     (M15.0) Primary osteoarthritis involving multiple joints  No indications of pain. PRN acetaminophen available.   Plan:   -No change and adjustments as clinically indicated.     (I10) Essential hypertension  Chronic, managed off antihypertensive agents.   Plan:   -Keep SBP> 130 mmHg and DBP > 65 mmHg (levels below these increase mortality as shown by standard studies and observations).       The current medical regimen is effective; continue present plan and medications.    Electronically signed by:  GI Ash CNP  [facility]   Video-Visit Details  Type of service:  Video Visit  Video End Time (time video stopped): 16:16  Distant Location (provider location):  [place] [facility]           Sincerely,        Lexii Reid NP

## 2020-04-02 NOTE — PROGRESS NOTES
"[place] [facility] E-VISIT   Lesvia Barney is being evaluated via a billable video visit due to the restrictions of the Covid-19 pandemic.   The patient has been notified of following:  \"This video visit will be conducted via a call between you and your provider. We have found that certain health care needs can be provided without the need for an in-person physical exam.  This service lets us provide the care you need with a video conversation. If during the course of the call the provider feels a video visit is not appropriate, you will not be charged for this service.\"   The provider has received verbal consent for a Video Visit from the patient or first contact? No  Patient  or facility staff would like the video invitation sent by: N/A   Video Start Time:1600  [place] Medical Record Number:  [id number]  Place of Location at the time of visit: [facility]   Chief Complaint   Patient presents with     Nursing Home Acute     HPI:  Lesvia Barney  is a 89 year old (10/29/1928), who is being seen today for an E-visit.  HPI information obtained from: facility chart records, facility staff and patient report.     Today's concern is:   Diagnosis Comments   1. Late onset Alzheimer's disease without behavioral disturbance (H)  Resident doing well. Up in hallway reading magazine. No behavioral or mood concerns per staff.    2. Primary osteoarthritis involving multiple joints  No indications of acute pain. Currently have PRN acetaminophen available.    3. Essential hypertension  BP Readings from Last 3 Encounters:   04/02/20 (!) 142/71   02/13/20 132/70   02/05/20 117/73   No indications of CP or SOB. No recent falls.          Past Medical and Surgical History reviewed in Epic today.  MEDICATIONS:    Current Outpatient Medications   Medication Sig Dispense Refill     Acetaminophen (TYLENOL PO) Take 650 mg by mouth every 4 hours as needed for mild pain or fever       AMOXICILLIN PO Give 2 gram by mouth as " "needed for Prophylaxis Dental Appointment       aspirin 81 MG chewable tablet Take 1 tablet (81 mg) by mouth daily 108 tablet 3     senna-docusate (SENOKOT-S;PERICOLACE) 8.6-50 MG per tablet Take 1 tablet by mouth daily as needed for constipation       sodium chloride (OCEAN NASAL SPRAY) 0.65 % nasal spray Spray 1 spray into both nostrils every hour as needed for congestion for Nasal bleeding.       trolamine salicylate (ASPERCREME) 10 % cream Apply topically 3 times daily as needed Apply to left knee        nystatin POWD Apply to Under Breasts topically two times a day for Rash AND Apply to Under Breast topically as needed for Rash BID prn       REVIEW OF SYSTEMS: Unobtainable secondary to cognitive impairment.   Objective: BP (!) 142/71   Pulse 78   Temp 98.5  F (36.9  C)   Resp 18   Ht 1.702 m (5' 7\")   Wt 73.7 kg (162 lb 8 oz)   SpO2 96%   BMI 25.45 kg/m     E-visit exam done given COVID-19 precautions.   GENERAL APPEARANCE:  Alert  RESP:  no respiratory distress  CV:  no edema  M/S:   Gait and station normal  Digits and nails normal  SKIN:  Inspection of skin and subcutaneous tissue baseline, Palpation of skin and subcutaneous tissue baseline  NEURO:   Cranial nerves 2-12 are normal tested and grossly at patient's baseline  Labs:   Labs done in SNF are in [facility]. Please refer to them using EPIC/Care Everywhere. and Recent labs in EPIC reviewed by me today.     ASSESSMENT/PLAN:  (G30.1,  F02.80) Late onset Alzheimer's disease without behavioral disturbance (H)  (primary encounter diagnosis)  Resident doing well on Board and Care memory care unit. No behaviors noted. Weight is stable and without recent fall.  Plan:   -Continue Board and Care assist with medication administration, meals, and ADLs.   -Expect further decline with progression of disease. Monitor weight, behavior and mood and update NP with changes.     (M15.0) Primary osteoarthritis involving multiple joints  No indications of pain. PRN " acetaminophen available.   Plan:   -No change and adjustments as clinically indicated.     (I10) Essential hypertension  Chronic, managed off antihypertensive agents.   Plan:   -Keep SBP> 130 mmHg and DBP > 65 mmHg (levels below these increase mortality as shown by standard studies and observations).       The current medical regimen is effective; continue present plan and medications.    Electronically signed by:  GI Ash CNP  [facility]   Video-Visit Details  Type of service:  Video Visit  Video End Time (time video stopped): 16:16  Distant Location (provider location):  [facility]

## 2020-04-21 ENCOUNTER — VIRTUAL VISIT (OUTPATIENT)
Dept: GERIATRICS | Facility: CLINIC | Age: 85
End: 2020-04-21
Payer: COMMERCIAL

## 2020-04-21 VITALS
SYSTOLIC BLOOD PRESSURE: 136 MMHG | WEIGHT: 153.7 LBS | RESPIRATION RATE: 15 BRPM | HEIGHT: 67 IN | DIASTOLIC BLOOD PRESSURE: 72 MMHG | HEART RATE: 66 BPM | BODY MASS INDEX: 24.12 KG/M2 | OXYGEN SATURATION: 90 % | TEMPERATURE: 98.1 F

## 2020-04-21 DIAGNOSIS — G30.1 LATE ONSET ALZHEIMER'S DISEASE WITHOUT BEHAVIORAL DISTURBANCE (H): ICD-10-CM

## 2020-04-21 DIAGNOSIS — F02.80 LATE ONSET ALZHEIMER'S DISEASE WITHOUT BEHAVIORAL DISTURBANCE (H): ICD-10-CM

## 2020-04-21 DIAGNOSIS — D45 PRIMARY POLYCYTHEMIA (H): ICD-10-CM

## 2020-04-21 DIAGNOSIS — M15.0 PRIMARY OSTEOARTHRITIS INVOLVING MULTIPLE JOINTS: Primary | ICD-10-CM

## 2020-04-21 PROCEDURE — 99207 ZZC CDG-CUT & PASTE-POTENTIAL IMPACT ON LEVEL: CPT | Performed by: INTERNAL MEDICINE

## 2020-04-21 PROCEDURE — 99309 SBSQ NF CARE MODERATE MDM 30: CPT | Mod: 95 | Performed by: INTERNAL MEDICINE

## 2020-04-21 ASSESSMENT — MIFFLIN-ST. JEOR: SCORE: 1144.81

## 2020-04-21 NOTE — LETTER
"    4/21/2020        RE: Lesvia Barney  [address] [address]  [facility] [address]        [place] [facility] Regulatory   Lesvia Barney is being evaluated via a billable video visit due to the restrictions of the Covid-19 pandemic.   The patient has been notified of following:  \"This video visit will be conducted via a call between you and your provider. We have found that certain health care needs can be provided without the need for an in-person physical exam.  This service lets us provide the care you need with a video conversation. If during the course of the call the provider feels a video visit is not appropriate, you will not be charged for this service.\"   The provider has received verbal consent for a Video Visit from the patient or first contact? Yes  Patient or facility staff would like the video invitation sent by: N/A   Video Start Time: 1:44 pm  [place] Medical Record Number:  [id number]  Place of Location at the time of visit: [facility]   Chief Complaint   Patient presents with     [facility]     HPI:  Lesvia Barney  is a 89 year old (10/29/1928), who is being seen today for an E-REG visit.  HPI information obtained from: facility staff and Epic chart review.  She is at [facility] Care unit where she has resided for 4 years (admit 8/2015) seen to follow up progressive Alzheimer's dementia.   She had influenza A in February, complicated by LLL pleural effusion and pneumonia, but has recovered from that.      Pt is seen in her room up to chair.   She is gracious, but not able to give much history.   States \"I'm going home today.\"    Nursing staff report general decline functionally, and patient refuses bathing or showers.        She has HTN by history, but sbps are currently stable without anti-hypertensive tx:  BP Readings from Last 3 Encounters:   04/21/20 136/72   04/02/20 (!) 142/71   02/13/20 132/70       She remains ambulatory with a 4WW but " "with significant deformity of LE joints, flexed at knees and hips, forward stoop.   Has become a difficult transfer.      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     Her brother  leaving her without any close kin.   A neighbor Sarah is her POA.     MEDICATIONS:  Current Outpatient Medications   Medication Sig Dispense Refill     Acetaminophen (TYLENOL PO) Take 650 mg by mouth every 4 hours as needed for mild pain or fever       AMOXICILLIN PO Give 2 gram by mouth as needed for Prophylaxis Dental Appointment       aspirin 81 MG chewable tablet Take 1 tablet (81 mg) by mouth daily 108 tablet 3     senna-docusate (SENOKOT-S;PERICOLACE) 8.6-50 MG per tablet Take 1 tablet by mouth daily as needed for constipation       sodium chloride (OCEAN NASAL SPRAY) 0.65 % nasal spray Spray 1 spray into both nostrils every hour as needed for congestion for Nasal bleeding.       trolamine salicylate (ASPERCREME) 10 % cream Apply topically 3 times daily as needed Apply to left knee        REVIEW OF SYSTEMS: Unobtainable secondary to cognitive impairment.   Wt Readings from Last 5 Encounters:   20 69.7 kg (153 lb 11.2 oz)   20 73.7 kg (162 lb 8 oz)   20 75.9 kg (167 lb 6.4 oz)   20 75.9 kg (167 lb 6.4 oz)   12/10/19 74.4 kg (164 lb)      Objective: /72   Pulse 66   Temp 98.1  F (36.7  C)   Resp 15   Ht 1.702 m (5' 7\")   Wt 69.7 kg (153 lb 11.2 oz)   SpO2 90%   BMI 24.07 kg/m    Limited visit exam done given COVID-19 precautions.   GENERAL APPEARANCE:  Alert, in no distress, confused   More frail in appearance    Labs: reviewed  2020 Cr 0.83 and GFR >60    ASSESSMENT/PLAN:  (D45) Primary " polycythemia (H)  Comment: hgb 15.9 on most recent check; no clear cause documented, no h/o lung disease or hypoxia  Plan: She is not a candidate for a workup  She is on daily aspirin 81 mg     (M15.0) Primary osteoarthritis involving multiple joints   Comment: significant deforming changes of OA in LE joints, but no reports of pain.  Plan: Assist with transfers.   Ambulation with walker  Local measures and prn acetaminophen should she c/o pain.         (G30.1,  F02.80) Late onset Alzheimer's disease without behavioral disturbance  Comment:  gradual decline over past few years, now with loss of language and functional status  Plan: Board and Care for assist with meals, meds, cares, activity.      Add scheduled dose of trazodone prior to bathing and see if this helps.        Advanced directives: DNR/DNI, comfort focus.   Friend Sarah Irving is first contact.     Electronically signed by:  aNbila Velasquez MD     Video-Visit Details  Type of service:  Video Visit  Video End Time (time video stopped): 1:52 pm  Distant Location (provider location):  [place] [facility]           Sincerely,        Nabila Velasquez MD

## 2020-04-21 NOTE — PROGRESS NOTES
"[place] [facility] Regulatory   Lesvia Barney is being evaluated via a billable video visit due to the restrictions of the Covid-19 pandemic.   The patient has been notified of following:  \"This video visit will be conducted via a call between you and your provider. We have found that certain health care needs can be provided without the need for an in-person physical exam.  This service lets us provide the care you need with a video conversation. If during the course of the call the provider feels a video visit is not appropriate, you will not be charged for this service.\"   The provider has received verbal consent for a Video Visit from the patient or first contact? Yes  Patient or facility staff would like the video invitation sent by: N/A   Video Start Time: 1:44 pm  [place] Medical Record Number:  [id number]  Place of Location at the time of visit: [facility]   Chief Complaint   Patient presents with     [facility] Regulatory     HPI:  Lesvia Barney  is a 89 year old (10/29/1928), who is being seen today for an E-REG visit.  HPI information obtained from: facility staff and Epic chart review.  She is at [facility] Care unit where she has resided for 4 years (admit 8/2015) seen to follow up progressive Alzheimer's dementia.   She had influenza A in February, complicated by LLL pleural effusion and pneumonia, but has recovered from that.      Pt is seen in her room up to chair.   She is gracious, but not able to give much history.   States \"I'm going home today.\"    Nursing staff report general decline functionally, and patient refuses bathing or showers.        She has HTN by history, but sbps are currently stable without anti-hypertensive tx:  BP Readings from Last 3 Encounters:   04/21/20 136/72   04/02/20 (!) 142/71   02/13/20 132/70       She remains ambulatory with a 4WW but with significant deformity of LE joints, flexed at knees and hips, forward stoop.   Has " "become a difficult transfer.      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    Her POA is tony Barney, [phone]    MEDICATIONS:  Current Outpatient Medications   Medication Sig Dispense Refill     Acetaminophen (TYLENOL PO) Take 650 mg by mouth every 4 hours as needed for mild pain or fever       AMOXICILLIN PO Give 2 gram by mouth as needed for Prophylaxis Dental Appointment       aspirin 81 MG chewable tablet Take 1 tablet (81 mg) by mouth daily 108 tablet 3     senna-docusate (SENOKOT-S;PERICOLACE) 8.6-50 MG per tablet Take 1 tablet by mouth daily as needed for constipation       sodium chloride (OCEAN NASAL SPRAY) 0.65 % nasal spray Spray 1 spray into both nostrils every hour as needed for congestion for Nasal bleeding.       trolamine salicylate (ASPERCREME) 10 % cream Apply topically 3 times daily as needed Apply to left knee        REVIEW OF SYSTEMS: Unobtainable secondary to cognitive impairment.   Wt Readings from Last 5 Encounters:   04/21/20 69.7 kg (153 lb 11.2 oz)   04/02/20 73.7 kg (162 lb 8 oz)   02/13/20 75.9 kg (167 lb 6.4 oz)   02/05/20 75.9 kg (167 lb 6.4 oz)   12/10/19 74.4 kg (164 lb)      Objective: /72   Pulse 66   Temp 98.1  F (36.7  C)   Resp 15   Ht 1.702 m (5' 7\")   Wt 69.7 kg (153 lb 11.2 oz)   SpO2 90%   BMI 24.07 kg/m    Limited visit exam done given COVID-19 precautions.   GENERAL APPEARANCE:  Alert, in no distress, confused   More frail in appearance    Labs: reviewed  February 2020 Cr 0.83 and GFR >60    ASSESSMENT/PLAN:  (D45) Primary polycythemia (H)  Comment: hgb 15.9 on most recent check; no clear cause documented, no h/o lung disease or hypoxia  Plan: She is " not a candidate for a workup  She is on daily aspirin 81 mg     (M15.0) Primary osteoarthritis involving multiple joints   Comment: significant deforming changes of OA in LE joints, but no reports of pain.  Plan: Assist with transfers.   Ambulation with walker  Local measures and prn acetaminophen should she c/o pain.         (G30.1,  F02.80) Late onset Alzheimer's disease without behavioral disturbance  Comment:  gradual decline over past few years, now with loss of language and functional status  Plan: Board and Care for assist with meals, meds, cares, activity.            Advanced directives: DNR/DNI, comfort focus.   Friend Sarah Irving is first contact.     Electronically signed by:  Nabila Velasquez MD     Video-Visit Details  Type of service:  Video Visit  Video End Time (time video stopped): 1:52 pm  Distant Location (provider location):  [place] [facility]

## 2020-06-04 ENCOUNTER — TELEPHONE (OUTPATIENT)
Dept: GERIATRICS | Facility: CLINIC | Age: 85
End: 2020-06-04

## 2020-06-04 ASSESSMENT — MIFFLIN-ST. JEOR: SCORE: 1110.33

## 2020-06-04 NOTE — TELEPHONE ENCOUNTER
Called by nursing staff at [facility] Board and Care about patient who has tested COVID + on screening.     Another 2 residents she usually sits with are also positive.   Pt is currently asx.       PLAN: Transfer over to [facility] to COVID unit with same medications and plan of care.     Nabila Velasquez MD

## 2020-06-05 ENCOUNTER — NURSING HOME VISIT (OUTPATIENT)
Dept: GERIATRICS | Facility: CLINIC | Age: 85
End: 2020-06-05
Payer: COMMERCIAL

## 2020-06-05 VITALS
RESPIRATION RATE: 20 BRPM | HEIGHT: 67 IN | WEIGHT: 135.6 LBS | OXYGEN SATURATION: 96 % | HEART RATE: 91 BPM | TEMPERATURE: 97.8 F | BODY MASS INDEX: 21.28 KG/M2 | SYSTOLIC BLOOD PRESSURE: 128 MMHG | DIASTOLIC BLOOD PRESSURE: 59 MMHG

## 2020-06-05 DIAGNOSIS — D45 PRIMARY POLYCYTHEMIA (H): ICD-10-CM

## 2020-06-05 DIAGNOSIS — G30.1 LATE ONSET ALZHEIMER'S DISEASE WITHOUT BEHAVIORAL DISTURBANCE (H): ICD-10-CM

## 2020-06-05 DIAGNOSIS — M15.0 PRIMARY OSTEOARTHRITIS INVOLVING MULTIPLE JOINTS: ICD-10-CM

## 2020-06-05 DIAGNOSIS — U07.1 COVID-19 VIRUS INFECTION: Primary | ICD-10-CM

## 2020-06-05 DIAGNOSIS — F02.80 LATE ONSET ALZHEIMER'S DISEASE WITHOUT BEHAVIORAL DISTURBANCE (H): ICD-10-CM

## 2020-06-05 PROCEDURE — 99309 SBSQ NF CARE MODERATE MDM 30: CPT | Performed by: INTERNAL MEDICINE

## 2020-06-05 ASSESSMENT — MIFFLIN-ST. JEOR: SCORE: 1062.71

## 2020-06-05 NOTE — PROGRESS NOTES
"[place] [facility]  [place] Medical Record Number:  [id number]  Lesvia Barney is a 89 year old female seen June 5, 2020 at [facility] where she has resided for almost 5 years (admit to Board and Care 8/2015) seen after transfer to LT to reside in the COVID unit.  Pt and 2 other residents that she usually sat with on the Memory Care unit all tested +for COVID-19  Pt appears to be asx, but the move has really thrown her off.   She is seen in her room, and later ambulating in the hallway.   Keeps repeating \"I'm going home today.\"   Doesn't tract well enough to give much other history.    Pt has many years of gradually progressive Alzheimer's dementia with general decline functionally.    No longer wearing lipstick or quite as well put together as she used to be, refuses bathing or showers.    She remains ambulatory with a 4WW but with significant deformity of LE joints, flexed at knees and hips, forward stoop.   Has become a difficult transfer.      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  Her POA is nephrodolfo Barney [phone]    ROS:  +incontinence; BIMS 8/15  Wt Readings from Last 5 Encounters:   06/05/20 61.5 kg (135 lb 9.6 oz)   04/21/20 69.7 kg (153 lb 11.2 oz)   04/02/20 73.7 kg (162 lb 8 oz)   02/13/20 75.9 kg (167 lb 6.4 oz)   02/05/20 75.9 kg (167 lb 6.4 oz)        EXAM:  NAD, alert and oriented x1  /59   Pulse 91   Temp 97.8  F (36.6  C)   Resp 20   Ht 1.702 m (5' 7\")   Wt 61.5 kg (135 lb 9.6 oz)   SpO2 96%   BMI 21.24 kg/m     Neck supple without adenopathy  Lungs with decreased BS, no rales or wheeze  Heart RRR s1s2, without murmur  Abd soft, NT, no distention, +BS, no masses, " no rebound or guarding.     Ext trace ankle edema L>R, valgus deformity of left knee, and chronic hip flexion  Neuro: no tremor or stiffness, +wordfinding difficulty.  Psych: affect anxious    LABS 2/5/2020:  Na 143   K 4.4  BUN/Cr 12/0.8  GFR >60   WBC 6.3  hgb 14.3  plts 202k      IMP/PLAN:  (U07.1) COVID-19 virus infection  (primary encounter diagnosis)  Comment: minimal symptoms   Plan: supportive care in LTC COVID unit.     Monitor respiratory status and O2sat, add supplemental oxygen if she is symptomatic.      (D45) Primary polycythemia (H)  Comment: no clear cause documented, no h/o lung disease or hypoxia  Plan: She is not a candidate for a workup  She is on daily aspirin 81 mg       (M15.0) Primary osteoarthritis involving multiple joints   Comment: significant deforming changes of OA in LE joints, but no reports of pain.  Plan: Assist with transfers.   Ambulation with walker  Local measures and prn acetaminophen should she c/o pain.         (G30.1,  F02.80) Late onset Alzheimer's disease without behavioral disturbance  Comment:  gradual decline over past few years, now with loss of language and functional status.    More disoriented with move to LTC.  Plan: LTC for assist with meals, meds, cares, activity.      Scheduled dose of trazodone prior to bathing.        Advanced directives: DNR/DNI.        Nabila Velasquez MD

## 2020-06-05 NOTE — LETTER
"    6/5/2020        RE: Lesvia Barney  [facility]  [address]  [facility] [address]        [place] [facility]  [place] Medical Record Number:  [id number]  Lesvia Barney is a 89 year old female seen June 5, 2020 at [facility] where she has resided for almost 5 years (admit to Board and Care 8/2015) seen after transfer to LT to reside in the COVID unit.  Pt and 2 other residents that she usually sat with on the Memory Care unit all tested +for COVID-19  Pt appears to be asx, but the move has really thrown her off.   She is seen in her room, and later ambulating in the hallway.   Keeps repeating \"I'm going home today.\"   Doesn't tract well enough to give much other history.    Pt has many years of gradually progressive Alzheimer's dementia with general decline functionally.    No longer wearing lipstick or quite as well put together as she used to be, refuses bathing or showers.    She remains ambulatory with a 4WW but with significant deformity of LE joints, flexed at knees and hips, forward stoop.   Has become a difficult transfer.      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  Her POA is tony Barney [phone]    ROS:  +incontinence; BIMS 8/15  Wt Readings from Last 5 Encounters:   06/05/20 61.5 kg (135 lb 9.6 oz)   04/21/20 69.7 kg (153 lb 11.2 oz)   04/02/20 73.7 kg (162 lb 8 oz)   02/13/20 75.9 kg (167 lb 6.4 oz)   02/05/20 75.9 kg (167 lb 6.4 oz)        EXAM:  NAD, alert and oriented x1  /59   Pulse 91   Temp 97.8  F (36.6  C)   Resp 20   Ht 1.702 m (5' 7\")   Wt 61.5 kg (135 lb 9.6 oz)   SpO2 96%   BMI 21.24 kg/m     Neck supple without " adenopathy  Lungs with decreased BS, no rales or wheeze  Heart RRR s1s2, without murmur  Abd soft, NT, no distention, +BS, no masses, no rebound or guarding.     Ext trace ankle edema L>R, valgus deformity of left knee, and chronic hip flexion  Neuro: no tremor or stiffness, +wordfinding difficulty.  Psych: affect anxious    LABS 2/5/2020:  Na 143   K 4.4  BUN/Cr 12/0.8  GFR >60   WBC 6.3  hgb 14.3  plts 202k      IMP/PLAN:  (U07.1) COVID-19 virus infection  (primary encounter diagnosis)  Comment: minimal symptoms   Plan: supportive care in LTC COVID unit.     Monitor respiratory status and O2sat, add supplemental oxygen if she is symptomatic.      (D45) Primary polycythemia (H)  Comment: no clear cause documented, no h/o lung disease or hypoxia  Plan: She is not a candidate for a workup  She is on daily aspirin 81 mg       (M15.0) Primary osteoarthritis involving multiple joints   Comment: significant deforming changes of OA in LE joints, but no reports of pain.  Plan: Assist with transfers.   Ambulation with walker  Local measures and prn acetaminophen should she c/o pain.         (G30.1,  F02.80) Late onset Alzheimer's disease without behavioral disturbance  Comment:  gradual decline over past few years, now with loss of language and functional status.    More disoriented with move to LTC.  Plan: LTC for assist with meals, meds, cares, activity.      Scheduled dose of trazodone prior to bathing.        Advanced directives: DNR/DNI.        Nabila Velasquez MD         Sincerely,        Nabila Velasquez MD

## 2020-06-18 ENCOUNTER — VIRTUAL VISIT (OUTPATIENT)
Dept: GERIATRICS | Facility: CLINIC | Age: 85
End: 2020-06-18
Payer: COMMERCIAL

## 2020-06-18 VITALS
TEMPERATURE: 98.2 F | WEIGHT: 135.6 LBS | HEART RATE: 62 BPM | BODY MASS INDEX: 21.28 KG/M2 | HEIGHT: 67 IN | SYSTOLIC BLOOD PRESSURE: 146 MMHG | OXYGEN SATURATION: 99 % | DIASTOLIC BLOOD PRESSURE: 79 MMHG | RESPIRATION RATE: 16 BRPM

## 2020-06-18 DIAGNOSIS — G30.1 LATE ONSET ALZHEIMER'S DISEASE WITHOUT BEHAVIORAL DISTURBANCE (H): ICD-10-CM

## 2020-06-18 DIAGNOSIS — U07.1 CLINICAL DIAGNOSIS OF COVID-19: Primary | ICD-10-CM

## 2020-06-18 DIAGNOSIS — F02.80 LATE ONSET ALZHEIMER'S DISEASE WITHOUT BEHAVIORAL DISTURBANCE (H): ICD-10-CM

## 2020-06-18 DIAGNOSIS — I10 ESSENTIAL HYPERTENSION: ICD-10-CM

## 2020-06-18 PROCEDURE — 99207 ZZC CDG-MDM COMPONENT: MEETS LOW - DOWN CODED: CPT | Performed by: NURSE PRACTITIONER

## 2020-06-18 PROCEDURE — 99308 SBSQ NF CARE LOW MDM 20: CPT | Mod: 95 | Performed by: NURSE PRACTITIONER

## 2020-06-18 ASSESSMENT — MIFFLIN-ST. JEOR: SCORE: 1062.71

## 2020-06-18 NOTE — PROGRESS NOTES
"[place] [facility] Regulatory   Lesvia Barney is being evaluated via a billable video visit due to the restrictions of the Covid-19 pandemic.   The patient has been notified of following:  \"This video visit will be conducted via a call between you and your provider. We have found that certain health care needs can be provided without the need for an in-person physical exam.  This service lets us provide the care you need with a video conversation. If during the course of the call the provider feels a video visit is not appropriate, you will not be charged for this service.\"   The provider has received verbal consent for a Video Visit from the patient or first contact? No  Patient or facility staff would like the video invitation sent by: N/A   Video Start Time: 10:34 AM    [place] Medical Record Number:  [id number]  Place of Location at the time of visit: [facility]   Chief Complaint   Patient presents with     [facility] Regulatory     HPI:  Lesvia Barney  is a 89 year old (10/29/1928), who is being seen today for a Regulatory visit.  HPI information obtained from: facility chart records and facility staff.     Today's concern is:    ICD-10-CM    1. Clinical diagnosis of COVID-19  U07.1    2. Late onset Alzheimer's disease without behavioral disturbance (H)  G30.1     F02.80    3. Essential hypertension  I10      Resident tested positive for covid-19. Remains asymptomatic and doing well. Up walking the hallway upon today's visit, pleasant and offered quick smile. Denies CP, SOB or lightheadedness.   BP Readings from Last 3 Encounters:   06/18/20 (!) 146/79   06/05/20 128/59   04/21/20 136/72     Wt Readings from Last 4 Encounters:   06/18/20 61.5 kg (135 lb 9.6 oz)   06/05/20 61.5 kg (135 lb 9.6 oz)   04/21/20 69.7 kg (153 lb 11.2 oz)   04/02/20 73.7 kg (162 lb 8 oz)     Case Management and Team Discussion:  I spoke with Nursing staff at the facility regarding the current Plan of Care. I have " "reviewed the facility/SNF care plan/MDS, including the falls risk, nutrition and pain screening. I also reviewed the current immunizations, and preventive care.Patient's desire to return to the community is not present. Current Level of Care is appropriate at this time. The Plan of Care is appropriate at this time.     Past Medical and Surgical History reviewed in Epic today.  MEDICATIONS:    Current Outpatient Medications   Medication Sig Dispense Refill     Acetaminophen (TYLENOL PO) Take 650 mg by mouth every 4 hours as needed for mild pain or fever       AMOXICILLIN PO Give 2 gram by mouth as needed for Prophylaxis Dental Appointment       aspirin 81 MG chewable tablet Take 1 tablet (81 mg) by mouth daily 108 tablet 3     senna-docusate (SENOKOT-S;PERICOLACE) 8.6-50 MG per tablet Take 1 tablet by mouth daily as needed for constipation       sodium chloride (OCEAN NASAL SPRAY) 0.65 % nasal spray Spray 1 spray into both nostrils every hour as needed for congestion for Nasal bleeding.       trolamine salicylate (ASPERCREME) 10 % cream Apply topically 3 times daily as needed Apply to left knee        REVIEW OF SYSTEMS: Unobtainable secondary to cognitive impairment.   Objective: BP (!) 146/79   Pulse 62   Temp 98.2  F (36.8  C)   Resp 16   Ht 1.702 m (5' 7\")   Wt 61.5 kg (135 lb 9.6 oz)   SpO2 99%   BMI 21.24 kg/m    Limited visit exam done given COVID-19 precautions.   GENERAL APPEARANCE:  Alert  ENT:  Mouth and posterior oropharynx normal, moist mucous membranes  RESP:  no respiratory distress  CV:  no edema  M/S:   Gait and station normal  Digits and nails normal  SKIN:  Inspection of skin and subcutaneous tissue baseline  NEURO:   Cranial nerves 2-12 are normal tested and grossly at patient's baseline  PSYCH:  memory impaired , affect and mood normal  Labs:   Labs done in SNF are in [facility]. Please refer to them using [facility]/Care Everywhere. and Recent labs in EPIC reviewed by me today. "     ASSESSMENT/PLAN:  (U07.1) Clinical diagnosis of COVID-19  (primary encounter diagnosis)  Comment: Positive. Remains asymptomatic.   Plan: Continue isolation, monitor and update NP with changes.     (G30.1,  F02.80) Late onset Alzheimer's disease without behavioral disturbance (H)  Comment: Chronic, progressive. Remains ambulatory, verbal and able to self feed.   Plan: Currently residing on LTC due to covid-19 isolation. Return to board and care when able. Expect further decline with progression of disease.     (I10) Essential hypertension  Comment: Chronic, blood pressure readings less than goal 150/90  Plan:   -Currently not requiring antihypertensive agents. Keep SBP> 130 mmHg and DBP > 65 mmHg (levels below these increase mortality as shown by standard studies and observations).       The current medical regimen is effective; continue present plan and medications.      Electronically signed by:  GI Ash CNP  [place] [facility]     Video-Visit Details  Type of service:  Video Visit  Video End Time (time video stopped):10:40 AM  Distant Location (provider location):  [place] [facility]

## 2020-06-18 NOTE — LETTER
"    6/18/2020        RE: [facility]  [address]  [facility] [address]        [place] [facility] Regulatory   Lesvia Barney is being evaluated via a billable video visit due to the restrictions of the Covid-19 pandemic.   The patient has been notified of following:  \"This video visit will be conducted via a call between you and your provider. We have found that certain health care needs can be provided without the need for an in-person physical exam.  This service lets us provide the care you need with a video conversation. If during the course of the call the provider feels a video visit is not appropriate, you will not be charged for this service.\"   The provider has received verbal consent for a Video Visit from the patient or first contact? No  Patient or facility staff would like the video invitation sent by: N/A   Video Start Time: 10:34 AM    [place] Medical Record Number:  [id number]  Place of Location at the time of visit: [facility]   Chief Complaint   Patient presents with     correction Regulatory     HPI:  Lesvia Barney  is a 89 year old (10/29/1928), who is being seen today for a Regulatory visit.  HPI information obtained from: facility chart records and facility staff.     Today's concern is:    ICD-10-CM    1. Clinical diagnosis of COVID-19  U07.1    2. Late onset Alzheimer's disease without behavioral disturbance (H)  G30.1     F02.80    3. Essential hypertension  I10      Resident tested positive for covid-19. Remains asymptomatic and doing well. Up walking the hallway upon today's visit, pleasant and offered quick smile. Denies CP, SOB or lightheadedness.   BP Readings from Last 3 Encounters:   06/18/20 (!) 146/79   06/05/20 128/59   04/21/20 136/72     Wt Readings from Last 4 Encounters:   06/18/20 61.5 kg (135 lb 9.6 oz)   06/05/20 61.5 kg (135 lb 9.6 oz)   04/21/20 69.7 kg (153 lb 11.2 oz)   04/02/20 73.7 kg (162 lb 8 oz)     Case " "Management and Team Discussion:  I spoke with Nursing staff at the facility regarding the current Plan of Care. I have reviewed the facility/SNF care plan/MDS, including the falls risk, nutrition and pain screening. I also reviewed the current immunizations, and preventive care.Patient's desire to return to the community is not present. Current Level of Care is appropriate at this time. The Plan of Care is appropriate at this time.     Past Medical and Surgical History reviewed in Epic today.  MEDICATIONS:    Current Outpatient Medications   Medication Sig Dispense Refill     Acetaminophen (TYLENOL PO) Take 650 mg by mouth every 4 hours as needed for mild pain or fever       AMOXICILLIN PO Give 2 gram by mouth as needed for Prophylaxis Dental Appointment       aspirin 81 MG chewable tablet Take 1 tablet (81 mg) by mouth daily 108 tablet 3     senna-docusate (SENOKOT-S;PERICOLACE) 8.6-50 MG per tablet Take 1 tablet by mouth daily as needed for constipation       sodium chloride (OCEAN NASAL SPRAY) 0.65 % nasal spray Spray 1 spray into both nostrils every hour as needed for congestion for Nasal bleeding.       trolamine salicylate (ASPERCREME) 10 % cream Apply topically 3 times daily as needed Apply to left knee        REVIEW OF SYSTEMS: Unobtainable secondary to cognitive impairment.   Objective: BP (!) 146/79   Pulse 62   Temp 98.2  F (36.8  C)   Resp 16   Ht 1.702 m (5' 7\")   Wt 61.5 kg (135 lb 9.6 oz)   SpO2 99%   BMI 21.24 kg/m    Limited visit exam done given COVID-19 precautions.   GENERAL APPEARANCE:  Alert  ENT:  Mouth and posterior oropharynx normal, moist mucous membranes  RESP:  no respiratory distress  CV:  no edema  M/S:   Gait and station normal  Digits and nails normal  SKIN:  Inspection of skin and subcutaneous tissue baseline  NEURO:   Cranial nerves 2-12 are normal tested and grossly at patient's baseline  PSYCH:  memory impaired , affect and mood normal  Labs:   Labs done in SNF are in " [facility]. Please refer to them using EPIC/Care Everywhere. and Recent labs in EPIC reviewed by me today.     ASSESSMENT/PLAN:  (U07.1) Clinical diagnosis of COVID-19  (primary encounter diagnosis)  Comment: Positive. Remains asymptomatic.   Plan: Continue isolation, monitor and update NP with changes.     (G30.1,  F02.80) Late onset Alzheimer's disease without behavioral disturbance (H)  Comment: Chronic, progressive. Remains ambulatory, verbal and able to self feed.   Plan: Currently residing on LTC due to covid-19 isolation. Return to [facility] and care when able. Expect further decline with progression of disease.     (I10) Essential hypertension  Comment: Chronic, blood pressure readings less than goal 150/90  Plan:   -Currently not requiring antihypertensive agents. Keep SBP> 130 mmHg and DBP > 65 mmHg (levels below these increase mortality as shown by standard studies and observations).       The current medical regimen is effective; continue present plan and medications.      Electronically signed by:  GI Ash CNP  [place] [facility]     Video-Visit Details  Type of service:  Video Visit  Video End Time (time video stopped):10:40 AM  Distant Location (provider location):  [place] [facility]           Sincerely,        Lexii Reid NP

## 2020-06-30 ENCOUNTER — NURSING HOME VISIT (OUTPATIENT)
Dept: GERIATRICS | Facility: CLINIC | Age: 85
End: 2020-06-30
Payer: COMMERCIAL

## 2020-06-30 VITALS
TEMPERATURE: 98 F | RESPIRATION RATE: 18 BRPM | HEART RATE: 75 BPM | WEIGHT: 147.3 LBS | DIASTOLIC BLOOD PRESSURE: 63 MMHG | OXYGEN SATURATION: 75 % | HEIGHT: 67 IN | SYSTOLIC BLOOD PRESSURE: 127 MMHG | BODY MASS INDEX: 23.12 KG/M2

## 2020-06-30 DIAGNOSIS — F02.80 LATE ONSET ALZHEIMER'S DISEASE WITHOUT BEHAVIORAL DISTURBANCE (H): Primary | ICD-10-CM

## 2020-06-30 DIAGNOSIS — D45 PRIMARY POLYCYTHEMIA (H): ICD-10-CM

## 2020-06-30 DIAGNOSIS — M15.0 PRIMARY OSTEOARTHRITIS INVOLVING MULTIPLE JOINTS: ICD-10-CM

## 2020-06-30 DIAGNOSIS — I10 ESSENTIAL HYPERTENSION: ICD-10-CM

## 2020-06-30 DIAGNOSIS — G30.1 LATE ONSET ALZHEIMER'S DISEASE WITHOUT BEHAVIORAL DISTURBANCE (H): Primary | ICD-10-CM

## 2020-06-30 PROCEDURE — 99309 SBSQ NF CARE MODERATE MDM 30: CPT | Performed by: NURSE PRACTITIONER

## 2020-06-30 ASSESSMENT — MIFFLIN-ST. JEOR: SCORE: 1115.78

## 2020-06-30 NOTE — LETTER
6/30/2020        RE: Lesvia Barney  [facility]  [address]  [facility] [address]        [place]  PRIMARY CARE PROVIDER AND CLINIC:  Lexii Reid NP, [address] [place] [address] / [facility] [address]  Chief Complaint   Patient presents with     ADMIT     [place] Medical Record Number:  [id number]  Place of Service where encounter took place:  [facility] ([facility]) [[id number]]    Lesvia Barney  is a 89 year old  (10/29/1928), admitted to the above facility from [facility]..  Admitted to this facility for  nursing care.    HPI:    HPI information obtained from: facility chart records, facility staff and patient report.   Updates on Status Since Skilled nursing Admission:   Resident discharged from LTC. Was in isolation 2/2 covid-19 positive. Resident remained asymptomatic and has had negative retest.   -Pleasant upon today's visit.   -No indicated of acute pain.   -denies CP, SOB or lightheadedness.   BP Readings from Last 3 Encounters:   06/30/20 127/63   06/18/20 (!) 146/79   06/05/20 128/59     Pulse Readings from Last 4 Encounters:   06/30/20 75   06/18/20 62   06/05/20 91   04/21/20 66     Wt Readings from Last 4 Encounters:   06/30/20 66.8 kg (147 lb 4.8 oz)   06/18/20 61.5 kg (135 lb 9.6 oz)   06/05/20 61.5 kg (135 lb 9.6 oz)   04/21/20 69.7 kg (153 lb 11.2 oz)         CODE STATUS/ADVANCE DIRECTIVES DISCUSSION:   DNR / DNI  Patient's living condition: lives in a residential care facility  ALLERGIES: No known allergies  PAST MEDICAL HISTORY:  has a past medical history of HTN (hypertension). She also has no past medical history of Chronic infection, Malignant hyperthermia, or Sleep apnea.  PAST SURGICAL HISTORY:   has a past surgical history that includes appendectomy; Open reduction internal fixation hip nailing (1/12/2014); Open reduction internal fixation hip (unk, prior to 2014); Arthroplasty hip (Right, 8/31/2015); and Remove hardware hip nailing  "(Right, 8/31/2015).  FAMILY HISTORY: family history includes C.A.D. in her father; Prostate Cancer in her brother.  SOCIAL HISTORY:   reports that she has never smoked. She does not have any smokeless tobacco history on file. She reports current alcohol use. She reports that she does not use drugs.    Post Discharge Medication Reconciliation Status: discharge medications reconciled, continue medications without change    Current Outpatient Medications   Medication Sig Dispense Refill     Acetaminophen (TYLENOL PO) Take 650 mg by mouth every 4 hours as needed for mild pain or fever       AMOXICILLIN PO Give 2 gram by mouth as needed for Prophylaxis Dental Appointment       aspirin 81 MG chewable tablet Take 1 tablet (81 mg) by mouth daily 108 tablet 3     senna-docusate (SENOKOT-S;PERICOLACE) 8.6-50 MG per tablet Take 1 tablet by mouth daily as needed for constipation       sodium chloride (OCEAN NASAL SPRAY) 0.65 % nasal spray Spray 1 spray into both nostrils every hour as needed for congestion for Nasal bleeding.       trolamine salicylate (ASPERCREME) 10 % cream Apply topically 3 times daily as needed Apply to left knee            ROS:  Unobtainable secondary to cognitive impairment.     Vitals:  /63   Pulse 75   Temp 98  F (36.7  C)   Resp 18   Ht 1.702 m (5' 7\")   Wt 66.8 kg (147 lb 4.8 oz)   SpO2 (!) 75%   BMI 23.07 kg/m    Exam:  GENERAL APPEARANCE:  Alert  ENT:  Mouth and posterior oropharynx normal, moist mucous membranes, normal hearing acuity  RESP:  no respiratory distress  CV:  no edema  M/S:   Gait and station normal  SKIN:  Inspection of skin and subcutaneous tissue baseline  NEURO:   Cranial nerves 2-12 are normal tested and grossly at patient's baseline  PSYCH:  insight and judgement impaired, memory impaired     Lab/Diagnostic data:  Labs done in SNF are in [place] [employer]. Please refer to them using [employer] Everywhere. and Recent labs in [employer] reviewed by me today. "     ASSESSMENT/PLAN:  (G30.1,  F02.80) Late onset Alzheimer's disease without behavioral disturbance (H)  (primary encounter diagnosis)  Comment: Chronic, progressive. At baseline, no behaviors per staff.   Plan:   -Continue Board and Care support for medication administration, meals, and safety.     (I10) Essential hypertension  Comment: Blood pressure less than goal 150/90   Plan:   -Not taking antihypertensive agents at this time   -Keep SBP> 130 mmHg and DBP > 65 mmHg (levels below these increase mortality as shown by standard studies and observations).     (M89.49) Primary osteoarthritis involving multiple joints  Comment: Longstanding history, pain managed on Aspercreme   Plan: No change, monitor     (D45) Primary polycythemia (H)  Comment: no clear cause documented, no h/o lung disease or hypoxia  Plan: She is not a candidate for a workup  She is on daily aspirin 81 mg         The current medical regimen is effective; continue present plan and medications.      Total time spent with patient visit at the skilled nursing facility was 35 min including patient visit and review of past records. Greater than 50% of total time spent with counseling and coordinating care due to new admission.     Electronically signed by:  GI Ash [facility]                       Sincerely,        Lexii Reid NP

## 2020-06-30 NOTE — PROGRESS NOTES
[facility]  PRIMARY CARE PROVIDER AND CLINIC:  Lexii Reid NP, [address] [facility] [address] / [place] [address]  Chief Complaint   Patient presents with     ADMIT     [place] Medical Record Number:  [id number]  Place of Service where encounter took place:  [facility] ([facility]) [[id number]]    Lesvia Barney  is a 89 year old  (10/29/1928), admitted to the above facility from [facility]..  Admitted to this facility for  nursing care.    HPI:    HPI information obtained from: facility chart records, facility staff and patient report.   Updates on Status Since Skilled nursing Admission:   Resident discharged from LTC. Was in isolation 2/2 covid-19 positive. Resident remained asymptomatic and has had negative retest.   -Pleasant upon today's visit.   -No indicated of acute pain.   -denies CP, SOB or lightheadedness.   BP Readings from Last 3 Encounters:   06/30/20 127/63   06/18/20 (!) 146/79   06/05/20 128/59     Pulse Readings from Last 4 Encounters:   06/30/20 75   06/18/20 62   06/05/20 91   04/21/20 66     Wt Readings from Last 4 Encounters:   06/30/20 66.8 kg (147 lb 4.8 oz)   06/18/20 61.5 kg (135 lb 9.6 oz)   06/05/20 61.5 kg (135 lb 9.6 oz)   04/21/20 69.7 kg (153 lb 11.2 oz)         CODE STATUS/ADVANCE DIRECTIVES DISCUSSION:   DNR / DNI  Patient's living condition: lives in a residential care facility  ALLERGIES: No known allergies  PAST MEDICAL HISTORY:  has a past medical history of HTN (hypertension). She also has no past medical history of Chronic infection, Malignant hyperthermia, or Sleep apnea.  PAST SURGICAL HISTORY:   has a past surgical history that includes appendectomy; Open reduction internal fixation hip nailing (1/12/2014); Open reduction internal fixation hip (unk, prior to 2014); Arthroplasty hip (Right, 8/31/2015); and Remove hardware hip nailing (Right, 8/31/2015).  FAMILY HISTORY: family history includes C.A.D. in her father; Prostate Cancer in her  "brother.  SOCIAL HISTORY:   reports that she has never smoked. She does not have any smokeless tobacco history on file. She reports current alcohol use. She reports that she does not use drugs.    Post Discharge Medication Reconciliation Status: discharge medications reconciled, continue medications without change    Current Outpatient Medications   Medication Sig Dispense Refill     Acetaminophen (TYLENOL PO) Take 650 mg by mouth every 4 hours as needed for mild pain or fever       AMOXICILLIN PO Give 2 gram by mouth as needed for Prophylaxis Dental Appointment       aspirin 81 MG chewable tablet Take 1 tablet (81 mg) by mouth daily 108 tablet 3     senna-docusate (SENOKOT-S;PERICOLACE) 8.6-50 MG per tablet Take 1 tablet by mouth daily as needed for constipation       sodium chloride (OCEAN NASAL SPRAY) 0.65 % nasal spray Spray 1 spray into both nostrils every hour as needed for congestion for Nasal bleeding.       trolamine salicylate (ASPERCREME) 10 % cream Apply topically 3 times daily as needed Apply to left knee            ROS:  Unobtainable secondary to cognitive impairment.     Vitals:  /63   Pulse 75   Temp 98  F (36.7  C)   Resp 18   Ht 1.702 m (5' 7\")   Wt 66.8 kg (147 lb 4.8 oz)   SpO2 (!) 75%   BMI 23.07 kg/m    Exam:  GENERAL APPEARANCE:  Alert  ENT:  Mouth and posterior oropharynx normal, moist mucous membranes, normal hearing acuity  RESP:  no respiratory distress  CV:  no edema  M/S:   Gait and station normal  SKIN:  Inspection of skin and subcutaneous tissue baseline  NEURO:   Cranial nerves 2-12 are normal tested and grossly at patient's baseline  PSYCH:  insight and judgement impaired, memory impaired     Lab/Diagnostic data:  Labs done in SNF are in [facility]. Please refer to them using [employer] Everywhere. and Recent labs in [employer] reviewed by me today.     ASSESSMENT/PLAN:  (G30.1,  F02.80) Late onset Alzheimer's disease without behavioral disturbance (H)  (primary encounter " diagnosis)  Comment: Chronic, progressive. At baseline, no behaviors per staff.   Plan:   -Continue Board and Care support for medication administration, meals, and safety.     (I10) Essential hypertension  Comment: Blood pressure less than goal 150/90   Plan:   -Not taking antihypertensive agents at this time   -Keep SBP> 130 mmHg and DBP > 65 mmHg (levels below these increase mortality as shown by standard studies and observations).     (M89.49) Primary osteoarthritis involving multiple joints  Comment: Longstanding history, pain managed on Aspercreme   Plan: No change, monitor     (D45) Primary polycythemia (H)  Comment: no clear cause documented, no h/o lung disease or hypoxia  Plan: She is not a candidate for a workup  She is on daily aspirin 81 mg         The current medical regimen is effective; continue present plan and medications.      Total time spent with patient visit at the skilled nursing facility was 35 min including patient visit and review of past records. Greater than 50% of total time spent with counseling and coordinating care due to new admission.     Electronically signed by:  GI Ash CNP  [facility]

## 2020-08-17 ENCOUNTER — NURSING HOME VISIT (OUTPATIENT)
Dept: GERIATRICS | Facility: CLINIC | Age: 85
End: 2020-08-17
Payer: COMMERCIAL

## 2020-08-17 VITALS
HEIGHT: 67 IN | BODY MASS INDEX: 23.61 KG/M2 | OXYGEN SATURATION: 96 % | WEIGHT: 150.4 LBS | SYSTOLIC BLOOD PRESSURE: 128 MMHG | RESPIRATION RATE: 18 BRPM | HEART RATE: 66 BPM | DIASTOLIC BLOOD PRESSURE: 68 MMHG | TEMPERATURE: 98.6 F

## 2020-08-17 DIAGNOSIS — F02.80 LATE ONSET ALZHEIMER'S DISEASE WITHOUT BEHAVIORAL DISTURBANCE (H): ICD-10-CM

## 2020-08-17 DIAGNOSIS — G30.1 LATE ONSET ALZHEIMER'S DISEASE WITHOUT BEHAVIORAL DISTURBANCE (H): ICD-10-CM

## 2020-08-17 DIAGNOSIS — D45 PRIMARY POLYCYTHEMIA (H): ICD-10-CM

## 2020-08-17 DIAGNOSIS — M15.0 PRIMARY OSTEOARTHRITIS INVOLVING MULTIPLE JOINTS: Primary | ICD-10-CM

## 2020-08-17 PROCEDURE — 99308 SBSQ NF CARE LOW MDM 20: CPT | Performed by: INTERNAL MEDICINE

## 2020-08-17 ASSESSMENT — MIFFLIN-ST. JEOR: SCORE: 1129.84

## 2020-08-17 NOTE — LETTER
"    8/17/2020        RE: Lesvia Barney  [facility]  [address]  [facility] [address]        [facility]  [place] Medical Record Number:  [id number]  Lesvia Barney is a 89 year old female seen August 17, 2020 at [facility] where she has resided for 5 years (admit to Board and Care 8/2015) seen for regulatory visit.   Pt is seen in her room sitting edge of bed.  Confused by PPE, \"Why do you wear that?\"   Otherwise in good spirits and reports feeling well.  Reports her usual \"I'm going home today.\"          Pt and 2 other residents that she usually sat with on the Memory Care unit all tested +for COVID-19     Patient moved over to [place] to [facility] with other +residents.   Remained asx during that time, but disoriented by the move.   Most recent test negative and now returned to Board and Care.   Pt has many years of gradually progressive Alzheimer's dementia with general decline functionally.    No longer wearing lipstick or quite as well put together as she used to be, refuses bathing or showers at times.    She remains ambulatory with a 4WW but with significant deformity of LE joints, flexed at knees and hips, forward stoop.   Has become a difficult transfer.      Past Medical History   Diagnosis Date     HTN (hypertension)    Grade I LV diastolic dysfunction, EF 55-60% by ECHO 2014  Late onset dementia, Alzheimer's type  DJD  S/P femoral neck fracture 2014, with subsequent AVN requiring ATIYA revision with hardware removal 8/2015    Primary polycythemia   COVID19+ June 2020    SH:  Single, retired from working in a bank.  Her POA is nephrodolfo Barney [phone]    ROS:  +incontinence; BIMS 8/15  Wt Readings from Last 5 Encounters:   08/17/20 68.2 kg (150 lb 6.4 oz)   06/30/20 66.8 kg (147 lb 4.8 oz)   06/18/20 61.5 kg (135 lb 9.6 oz)   06/05/20 61.5 kg (135 lb 9.6 oz)   04/21/20 69.7 kg (153 lb 11.2 oz)        EXAM:  NAD, alert and oriented x1  /68   Pulse " "66   Temp 98.6  F (37  C)   Resp 18   Ht 1.702 m (5' 7\")   Wt 68.2 kg (150 lb 6.4 oz)   SpO2 96%   BMI 23.56 kg/m     Neck supple without adenopathy  Lungs with decreased BS, no rales or wheeze  Heart RRR s1s2, without murmur  Abd soft, NT, no distention, +BS, no masses, no rebound or guarding.     Ext trace ankle edema L>R, valgus deformity of left knee, and chronic hip flexion  Neuro: no tremor or stiffness, +wordfinding difficulty.  Psych: affect anxious    Labs reviewed.     IMP/PLAN:  (D45) Primary polycythemia (H)  Comment: no clear cause documented, no h/o lung disease or hypoxia  Plan: She is not a candidate for a workup; recheck CBC prn only     She is on daily aspirin 81 mg       (M15.0) Primary osteoarthritis involving multiple joints   Comment: significant deforming changes of OA in LE joints, but no reports of pain.  Plan: Assist with transfers.   Ambulation with walker  Local measures and prn acetaminophen should she c/o pain.         (G30.1,  F02.80) Late onset Alzheimer's disease without behavioral disturbance  Comment:  gradual decline over past few years, low functional status  She can usually be redirected and has not needed pharmacologic intervention to date.     Plan: LTC for assist with meals, meds, cares, activity.          Advanced directives: DNR/DNI.        Nabila Velasquez MD         Sincerely,        Nabila Velasquez MD    "

## 2020-08-24 NOTE — PROGRESS NOTES
"[facility]  [place] Medical Record Number:  [id number]  Lesvia Barney is a 89 year old female seen August 17, 2020 at [facility] where she has resided for 5 years (admit to [facility] 8/2015) seen for regulatory visit.   Pt is seen in her room sitting edge of bed.  Confused by PPE, \"Why do you wear that?\"   Otherwise in good spirits and reports feeling well.  Reports her usual \"I'm going home today.\"          Pt and 2 other residents that she usually sat with on the Memory Care unit all tested +for COVID-19     Patient moved over to [facility] to cohort with other +residents.   Remained asx during that time, but disoriented by the move.   Most recent test negative and now returned to [facility].   Pt has many years of gradually progressive Alzheimer's dementia with general decline functionally.    No longer wearing lipstick or quite as well put together as she used to be, refuses bathing or showers at times.    She remains ambulatory with a 4WW but with significant deformity of LE joints, flexed at knees and hips, forward stoop.   Has become a difficult transfer.      Past Medical History   Diagnosis Date     HTN (hypertension)    Grade I LV diastolic dysfunction, EF 55-60% by ECHO 2014  Late onset dementia, Alzheimer's type  DJD  S/P femoral neck fracture 2014, with subsequent AVN requiring ATIYA revision with hardware removal 8/2015    Primary polycythemia   COVID19+ June 2020    SH:  Single, retired from working in a bank.  Her POA is tony Barney [phone]    ROS:  +incontinence; BIMS 8/15  Wt Readings from Last 5 Encounters:   08/17/20 68.2 kg (150 lb 6.4 oz)   06/30/20 66.8 kg (147 lb 4.8 oz)   06/18/20 61.5 kg (135 lb 9.6 oz)   06/05/20 61.5 kg (135 lb 9.6 oz)   04/21/20 69.7 kg (153 lb 11.2 oz)        EXAM:  NAD, alert and oriented x1  /68   Pulse 66   Temp 98.6  F (37  C)   Resp 18   Ht 1.702 m (5' 7\")   Wt 68.2 kg (150 lb 6.4 oz)   SpO2 96%   BMI 23.56 kg/m  "    Neck supple without adenopathy  Lungs with decreased BS, no rales or wheeze  Heart RRR s1s2, without murmur  Abd soft, NT, no distention, +BS, no masses, no rebound or guarding.     Ext trace ankle edema L>R, valgus deformity of left knee, and chronic hip flexion  Neuro: no tremor or stiffness, +wordfinding difficulty.  Psych: affect anxious    Labs reviewed.     IMP/PLAN:  (D45) Primary polycythemia (H)  Comment: no clear cause documented, no h/o lung disease or hypoxia  Plan: She is not a candidate for a workup; recheck CBC prn only     She is on daily aspirin 81 mg       (M15.0) Primary osteoarthritis involving multiple joints   Comment: significant deforming changes of OA in LE joints, but no reports of pain.  Plan: Assist with transfers.   Ambulation with walker  Local measures and prn acetaminophen should she c/o pain.         (G30.1,  F02.80) Late onset Alzheimer's disease without behavioral disturbance  Comment:  gradual decline over past few years, low functional status  She can usually be redirected and has not needed pharmacologic intervention to date.     Plan: LTC for assist with meals, meds, cares, activity.          Advanced directives: DNR/DNI.        Nabila Velasquez MD

## 2020-09-03 ENCOUNTER — NURSING HOME VISIT (OUTPATIENT)
Dept: GERIATRICS | Facility: CLINIC | Age: 85
End: 2020-09-03
Payer: COMMERCIAL

## 2020-09-03 VITALS
BODY MASS INDEX: 19.41 KG/M2 | OXYGEN SATURATION: 90 % | HEIGHT: 70 IN | TEMPERATURE: 98.2 F | HEART RATE: 77 BPM | WEIGHT: 135.6 LBS | RESPIRATION RATE: 18 BRPM

## 2020-09-03 DIAGNOSIS — F02.80 LATE ONSET ALZHEIMER'S DISEASE WITHOUT BEHAVIORAL DISTURBANCE (H): ICD-10-CM

## 2020-09-03 DIAGNOSIS — Z86.16 HISTORY OF 2019 NOVEL CORONAVIRUS DISEASE (COVID-19): ICD-10-CM

## 2020-09-03 DIAGNOSIS — G30.1 LATE ONSET ALZHEIMER'S DISEASE WITHOUT BEHAVIORAL DISTURBANCE (H): ICD-10-CM

## 2020-09-03 DIAGNOSIS — D45 PRIMARY POLYCYTHEMIA (H): ICD-10-CM

## 2020-09-03 DIAGNOSIS — I10 ESSENTIAL HYPERTENSION: ICD-10-CM

## 2020-09-03 DIAGNOSIS — M15.0 PRIMARY OSTEOARTHRITIS INVOLVING MULTIPLE JOINTS: Primary | ICD-10-CM

## 2020-09-03 PROCEDURE — 99318 ZZC ANNUAL NURSING FAC ASSESSMNT, STABLE: CPT | Performed by: NURSE PRACTITIONER

## 2020-09-03 NOTE — LETTER
9/3/2020        RE: Lesvia Barney  [facility]  [address]  [facility] [address]        [facility]  Chief Complaint   Patient presents with     Annual Comprehensive Nursing Home     [facility]     [place] Medical Record Number:  [id number]  Place of Service where encounter took place:  [facility] ([facility]) [[id number]]    HPI:    Lesvia Barney  is a 89 year old  (10/29/1928), who is being seen today for an annual comprehensive visit. HPI information obtained from: facility chart records, facility staff and patient report.  Today's concerns are:  1. Primary osteoarthritis involving multiple joints  Denies pain off medication.     2. Primary polycythemia (H)  Last platelet level 349    3. Late onset Alzheimer's disease without behavioral disturbance (H)  No behaviors noted.   Wt Readings from Last 4 Encounters:   06/04/20 61.5 kg (135 lb 9.6 oz)   08/17/20 68.2 kg (150 lb 6.4 oz)   06/30/20 66.8 kg (147 lb 4.8 oz)   06/18/20 61.5 kg (135 lb 9.6 oz)       4. Essential hypertension  BP Readings from Last 3 Encounters:   08/17/20 128/68   06/30/20 127/63   06/18/20 (!) 146/79   no indication of CP, SOB or lightheadedness.       5. History of 2019 novel coronavirus disease (COVID-19)  Resolved, resident remained asymptomatic.       ALLERGIES: No known allergies  PAST MEDICAL HISTORY:  has a past medical history of HTN (hypertension). She also has no past medical history of Chronic infection, Malignant hyperthermia, or Sleep apnea.  PAST SURGICAL HISTORY:  has a past surgical history that includes appendectomy; Open reduction internal fixation hip nailing (1/12/2014); Open reduction internal fixation hip (unk, prior to 2014); Arthroplasty hip (Right, 8/31/2015); and Remove hardware hip nailing (Right, 8/31/2015).  IMMUNIZATIONS:  Immunization History   Administered Date(s) Administered     Influenza (IIV3) PF 01/01/2014, 10/21/2015, 10/20/2016, 10/03/2017, 10/11/2018,  10/24/2019     Pneumo Conj 13-V (2010&after) 10/15/2015     Pneumococcal 23 valent 11/09/1999     Tdap (Adacel,Boostrix) 10/15/2015     Zoster vaccine, live 10/29/2012     Above immunizations pulled from [facility]. [facility] and facility records also reconciled. Outstanding information sent to  to update [facility].  Future immunizations are not needed at this point as all recommended immunizations are up to date.     Current Outpatient Medications   Medication Sig Dispense Refill     Acetaminophen (TYLENOL PO) Take 650 mg by mouth every 4 hours as needed for mild pain or fever       AMOXICILLIN PO Give 2 gram by mouth as needed for Prophylaxis Dental Appointment       aspirin 81 MG chewable tablet Take 1 tablet (81 mg) by mouth daily 108 tablet 3     senna-docusate (SENOKOT-S;PERICOLACE) 8.6-50 MG per tablet Take 1 tablet by mouth daily as needed for constipation       sodium chloride (OCEAN NASAL SPRAY) 0.65 % nasal spray Spray 1 spray into both nostrils every hour as needed for congestion for Nasal bleeding.       trolamine salicylate (ASPERCREME) 10 % cream Apply topically 3 times daily as needed Apply to left knee            Case Management:  I have reviewed the facility/SNF care plan/MDS, including the falls risk, nutrition and pain screening. I also reviewed the current immunizations, and preventive care. .Future cancer screening is not clinically indicated secondary to age/goals of care Patient's desire to return to the community is not assessible due to cognitive impairment. Current Level of Care is appropriate.    Advance Directive Discussion:    I reviewed the current advanced directives as reflected in EPIC, the POLST and the facility chart, and verified the congruency of orders. I did not contact first party, no medication changes made and no concerns at this time.  .  I did not due to cognitive impairment review the advance directives with the resident.     Team Discussion:  I  "communicated with the appropriate disciplines involved with the Plan of Care:   Nursing  ,    and Dietitian    Patient's goal is unobtainable secondary to cognitive impairment.  Information reviewed:  Medications, vital signs, orders, and nursing notes.    ROS:  Unobtainable secondary to cognitive impairment.     Vitals:  Pulse 77   Temp 98.2  F (36.8  C)   Resp 18   Ht 1.778 m (5' 10\")   Wt 61.5 kg (135 lb 9.6 oz)   SpO2 90%   BMI 19.46 kg/m   Body mass index is 19.46 kg/m .  Exam:  GENERAL APPEARANCE:  Alert, in no distress  ENT:  Mouth and posterior oropharynx normal, moist mucous membranes, normal hearing acuity  RESP:  respiratory effort and palpation of chest normal, no respiratory distress  CV:  Palpation and auscultation of heart done , regular rate and rhythm, no murmur, rub, or gallop  ABDOMEN:  normal bowel sounds, soft, nontender, no hepatosplenomegaly or other masses  M/S:   Gait and station normal  Digits and nails normal  SKIN:  Inspection of skin and subcutaneous tissue baseline, Palpation of skin and subcutaneous tissue baseline  NEURO:   Cranial nerves 2-12 are normal tested and grossly at patient's baseline  PSYCH:  memory impaired , affect and mood normal     Lab/Diagnostic data:   Labs done in SNF are in [employer]. Please refer to them using [employer] Everywhere. and Recent labs in [facility] reviewed by me today.     ASSESSMENT/PLAN  (M89.49) Primary osteoarthritis involving multiple joints  (primary encounter diagnosis)  Comment: No indications of pain off medication   Plan: no change, monitor     (D45) Primary polycythemia (H)  Comment: Stable   Plan: CBC 9/15/20    (G30.1,  F02.80) Late onset Alzheimer's disease without behavioral disturbance (H)  Comment: progressing, no behaviors noted. Weight stable.   Plan:   -Continue memory care board and care support for medication administration, meals and safety.      (I10) Essential hypertension  Comment: Chronic, managed off " medication   Plan:   -Keep SBP> 130 mmHg and DBP > 65 mmHg (levels below these increase mortality as shown by standard studies and observations).       (Z86.19) History of 2019 novel coronavirus disease (COVID-19)  Comment: Resolved   Plan: resolved         The health plan new enrollment has happened. I have reviewed the  MDS, the preventative needs,  and facility care plan. The level of care is appropriate. I have reviewed the code status/advanced directives.       Orders written by provider at facility  The current medical regimen is effective; continue present plan and medications.  -CBC and BMP on 9/15/20    Electronically signed by:  GI Ash [facility]         Sincerely,        Lexii Reid NP

## 2020-09-03 NOTE — PROGRESS NOTES
[facility]  Chief Complaint   Patient presents with     Annual Comprehensive Nursing Home     FVP     [place] Medical Record Number:  [id number]  Place of Service where encounter took place:  [facility] ([facility]) [[id number]]    HPI:    Lesvia Barney  is a 89 year old  (10/29/1928), who is being seen today for an annual comprehensive visit. HPI information obtained from: facility chart records, facility staff and patient report.  Today's concerns are:  1. Primary osteoarthritis involving multiple joints  Denies pain off medication.     2. Primary polycythemia (H)  Last platelet level 349    3. Late onset Alzheimer's disease without behavioral disturbance (H)  No behaviors noted.   Wt Readings from Last 4 Encounters:   06/04/20 61.5 kg (135 lb 9.6 oz)   08/17/20 68.2 kg (150 lb 6.4 oz)   06/30/20 66.8 kg (147 lb 4.8 oz)   06/18/20 61.5 kg (135 lb 9.6 oz)       4. Essential hypertension  BP Readings from Last 3 Encounters:   08/17/20 128/68   06/30/20 127/63   06/18/20 (!) 146/79   no indication of CP, SOB or lightheadedness.       5. History of 2019 novel coronavirus disease (COVID-19)  Resolved, resident remained asymptomatic.       ALLERGIES: No known allergies  PAST MEDICAL HISTORY:  has a past medical history of HTN (hypertension). She also has no past medical history of Chronic infection, Malignant hyperthermia, or Sleep apnea.  PAST SURGICAL HISTORY:  has a past surgical history that includes appendectomy; Open reduction internal fixation hip nailing (1/12/2014); Open reduction internal fixation hip (unk, prior to 2014); Arthroplasty hip (Right, 8/31/2015); and Remove hardware hip nailing (Right, 8/31/2015).  IMMUNIZATIONS:  Immunization History   Administered Date(s) Administered     Influenza (IIV3) PF 01/01/2014, 10/21/2015, 10/20/2016, 10/03/2017, 10/11/2018, 10/24/2019     Pneumo Conj 13-V (2010&after) 10/15/2015     Pneumococcal 23 valent 11/09/1999     Tdap (Adacel,Boostrix)  10/15/2015     Zoster vaccine, live 10/29/2012     Above immunizations pulled from [facility]. [facility] and facility records also reconciled. Outstanding information sent to  to update [facility].  Future immunizations are not needed at this point as all recommended immunizations are up to date.     Current Outpatient Medications   Medication Sig Dispense Refill     Acetaminophen (TYLENOL PO) Take 650 mg by mouth every 4 hours as needed for mild pain or fever       AMOXICILLIN PO Give 2 gram by mouth as needed for Prophylaxis Dental Appointment       aspirin 81 MG chewable tablet Take 1 tablet (81 mg) by mouth daily 108 tablet 3     senna-docusate (SENOKOT-S;PERICOLACE) 8.6-50 MG per tablet Take 1 tablet by mouth daily as needed for constipation       sodium chloride (OCEAN NASAL SPRAY) 0.65 % nasal spray Spray 1 spray into both nostrils every hour as needed for congestion for Nasal bleeding.       trolamine salicylate (ASPERCREME) 10 % cream Apply topically 3 times daily as needed Apply to left knee            Case Management:  I have reviewed the facility/SNF care plan/MDS, including the falls risk, nutrition and pain screening. I also reviewed the current immunizations, and preventive care. .Future cancer screening is not clinically indicated secondary to age/goals of care Patient's desire to return to the community is not assessible due to cognitive impairment. Current Level of Care is appropriate.    Advance Directive Discussion:    I reviewed the current advanced directives as reflected in EPIC, the POLST and the facility chart, and verified the congruency of orders. I did not contact first party, no medication changes made and no concerns at this time.  .  I did not due to cognitive impairment review the advance directives with the resident.     Team Discussion:  I communicated with the appropriate disciplines involved with the Plan of Care:   Nursing  ,    and Dietitian   "  Patient's goal is unobtainable secondary to cognitive impairment.  Information reviewed:  Medications, vital signs, orders, and nursing notes.    ROS:  Unobtainable secondary to cognitive impairment.     Vitals:  Pulse 77   Temp 98.2  F (36.8  C)   Resp 18   Ht 1.778 m (5' 10\")   Wt 61.5 kg (135 lb 9.6 oz)   SpO2 90%   BMI 19.46 kg/m   Body mass index is 19.46 kg/m .  Exam:  GENERAL APPEARANCE:  Alert, in no distress  ENT:  Mouth and posterior oropharynx normal, moist mucous membranes, normal hearing acuity  RESP:  respiratory effort and palpation of chest normal, no respiratory distress  CV:  Palpation and auscultation of heart done , regular rate and rhythm, no murmur, rub, or gallop  ABDOMEN:  normal bowel sounds, soft, nontender, no hepatosplenomegaly or other masses  M/S:   Gait and station normal  Digits and nails normal  SKIN:  Inspection of skin and subcutaneous tissue baseline, Palpation of skin and subcutaneous tissue baseline  NEURO:   Cranial nerves 2-12 are normal tested and grossly at patient's baseline  PSYCH:  memory impaired , affect and mood normal     Lab/Diagnostic data:   Labs done in SNF are in [place] [facility]. Please refer to them using [employer]/Care Everywhere. and Recent labs in EPIC reviewed by me today.     ASSESSMENT/PLAN  (M89.49) Primary osteoarthritis involving multiple joints  (primary encounter diagnosis)  Comment: No indications of pain off medication   Plan: no change, monitor     (D45) Primary polycythemia (H)  Comment: Stable   Plan: CBC 9/15/20    (G30.1,  F02.80) Late onset Alzheimer's disease without behavioral disturbance (H)  Comment: progressing, no behaviors noted. Weight stable.   Plan:   -Continue memory care board and care support for medication administration, meals and safety.      (I10) Essential hypertension  Comment: Chronic, managed off medication   Plan:   -Keep SBP> 130 mmHg and DBP > 65 mmHg (levels below these increase mortality as shown by standard studies " and observations).       (Z86.19) History of 2019 novel coronavirus disease (COVID-19)  Comment: Resolved   Plan: resolved         The health plan new enrollment has happened. I have reviewed the  MDS, the preventative needs,  and facility care plan. The level of care is appropriate. I have reviewed the code status/advanced directives.       Orders written by provider at facility  The current medical regimen is effective; continue present plan and medications.  -CBC and BMP on 9/15/20    Electronically signed by:  GI [facility]

## 2020-09-25 ASSESSMENT — MIFFLIN-ST. JEOR: SCORE: 1130.75

## 2020-10-01 ENCOUNTER — NURSING HOME VISIT (OUTPATIENT)
Dept: GERIATRICS | Facility: CLINIC | Age: 85
End: 2020-10-01
Payer: COMMERCIAL

## 2020-10-01 VITALS
WEIGHT: 150.6 LBS | OXYGEN SATURATION: 95 % | HEART RATE: 68 BPM | SYSTOLIC BLOOD PRESSURE: 143 MMHG | BODY MASS INDEX: 23.64 KG/M2 | RESPIRATION RATE: 18 BRPM | HEIGHT: 67 IN | DIASTOLIC BLOOD PRESSURE: 78 MMHG | TEMPERATURE: 98 F

## 2020-10-01 DIAGNOSIS — I10 ESSENTIAL HYPERTENSION: ICD-10-CM

## 2020-10-01 DIAGNOSIS — F02.80 LATE ONSET ALZHEIMER'S DISEASE WITHOUT BEHAVIORAL DISTURBANCE (H): ICD-10-CM

## 2020-10-01 DIAGNOSIS — G30.1 LATE ONSET ALZHEIMER'S DISEASE WITHOUT BEHAVIORAL DISTURBANCE (H): ICD-10-CM

## 2020-10-01 DIAGNOSIS — D45 PRIMARY POLYCYTHEMIA (H): ICD-10-CM

## 2020-10-01 DIAGNOSIS — M15.0 PRIMARY OSTEOARTHRITIS INVOLVING MULTIPLE JOINTS: Primary | ICD-10-CM

## 2020-10-01 PROCEDURE — 99309 SBSQ NF CARE MODERATE MDM 30: CPT | Performed by: NURSE PRACTITIONER

## 2020-10-01 NOTE — PROGRESS NOTES
[place]  Chief Complaint   Patient presents with     senior living Regulatory     [place] Medical Record Number:  [id number]  Place of Service where encounter took place:  [facility] ([facility]) [[id number]]    HPI:    Lesvia Barney  is 89 year old (10/29/1928), who is being seen today for a federally mandated E/M visit.  HPI information obtained from: facility chart records.     Today's concerns are:  1. Primary osteoarthritis involving multiple joints  Denies pain off medication.      2. Primary polycythemia (H)  Last platelet level 349     3. Late onset Alzheimer's disease without behavioral disturbance (H)  No behaviors noted.       Wt Readings from Last 4 Encounters:   06/04/20 61.5 kg (135 lb 9.6 oz)   08/17/20 68.2 kg (150 lb 6.4 oz)   06/30/20 66.8 kg (147 lb 4.8 oz)   06/18/20 61.5 kg (135 lb 9.6 oz)      Wt Readings from Last 4 Encounters:   09/25/20 68.3 kg (150 lb 9.6 oz)   06/04/20 61.5 kg (135 lb 9.6 oz)   08/17/20 68.2 kg (150 lb 6.4 oz)   06/30/20 66.8 kg (147 lb 4.8 oz)        4. Essential hypertension      BP Readings from Last 3 Encounters:   08/17/20 128/68   06/30/20 127/63   06/18/20 (!) 146/79      BP Readings from Last 3 Encounters:   09/25/20 (!) 143/78   08/17/20 128/68   06/30/20 127/63   No indication of CP, SOB or lightheadedness.       ALLERGIES:No known allergies  PAST MEDICAL HISTORY:   has a past medical history of HTN (hypertension). She also has no past medical history of Chronic infection, Malignant hyperthermia, or Sleep apnea.  PAST SURGICAL HISTORY:   has a past surgical history that includes appendectomy; Open reduction internal fixation hip nailing (1/12/2014); Open reduction internal fixation hip (unk, prior to 2014); Arthroplasty hip (Right, 8/31/2015); and Remove hardware hip nailing (Right, 8/31/2015).  FAMILY HISTORY: family history includes C.A.D. in her father; Prostate Cancer in her brother.  SOCIAL HISTORY:  reports that she has never  "smoked. She has never used smokeless tobacco. She reports current alcohol use. She reports that she does not use drugs.    MEDICATIONS:  Current Outpatient Medications   Medication Sig Dispense Refill     aspirin 81 MG chewable tablet Take 1 tablet (81 mg) by mouth daily 108 tablet 3         Case Management:  I have reviewed the care plan and MDS and do agree with the plan. Patient's desire to return to the community is not assessible due to cognitive impairment. Information reviewed:  Medications, vital signs, orders, and nursing notes.    ROS:  Unobtainable secondary to cognitive impairment.     Vitals:  BP (!) 143/78   Pulse 68   Temp 98  F (36.7  C)   Resp 18   Ht 1.702 m (5' 7\")   Wt 68.3 kg (150 lb 9.6 oz)   SpO2 95%   BMI 23.59 kg/m    Body mass index is 23.59 kg/m .  Exam:  GENERAL APPEARANCE:  Alert  ENT:  Mouth and posterior oropharynx normal, moist mucous membranes, [place]  RESP:  respiratory effort and palpation of chest normal, lungs clear to auscultation , no respiratory distress  CV:  Palpation and auscultation of heart done , regular rate and rhythm, no murmur, rub, or gallop  M/S:   Gait and station normal  Digits and nails normal  SKIN:  Inspection of skin and subcutaneous tissue baseline, Palpation of skin and subcutaneous tissue baseline  NEURO:   Cranial nerves 2-12 are normal tested and grossly at patient's baseline  PSYCH:  memory impaired , affect and mood normal    Lab/Diagnostic data:   Labs done in SNF are in [facility]. Please refer to them using [employer] [place] Everywhere. and Recent labs in [facility] reviewed by me today.     ASSESSMENT/PLAN  (M89.49) Primary osteoarthritis involving multiple joints  (primary encounter diagnosis)  Comment: No indications of pain off medication   Plan: no change, monitor      (D45) Primary polycythemia (H)  Comment: Stable   Plan: CBC 9/15/20 was at baseline      (G30.1,  F02.80) Late onset Alzheimer's disease without behavioral disturbance (H)  Comment: " progressing, no behaviors noted. Weight stable.   Plan:   -Continue memory care board and care support for medication administration, meals and safety.       (I10) Essential hypertension  Comment: Chronic, managed off medication   Plan:   -Keep SBP> 130 mmHg and DBP > 65 mmHg (levels below these increase mortality as shown by standard studies and observations).    The current medical regimen is effective; continue present plan and medications.    Electronically signed by:  GI [facility]

## 2020-12-11 ENCOUNTER — NURSING HOME VISIT (OUTPATIENT)
Dept: GERIATRICS | Facility: CLINIC | Age: 85
End: 2020-12-11
Payer: COMMERCIAL

## 2020-12-11 VITALS
BODY MASS INDEX: 24.08 KG/M2 | TEMPERATURE: 97.7 F | SYSTOLIC BLOOD PRESSURE: 118 MMHG | WEIGHT: 153.4 LBS | HEART RATE: 88 BPM | HEIGHT: 67 IN | DIASTOLIC BLOOD PRESSURE: 62 MMHG | RESPIRATION RATE: 20 BRPM | OXYGEN SATURATION: 94 %

## 2020-12-11 DIAGNOSIS — D45 PRIMARY POLYCYTHEMIA (H): ICD-10-CM

## 2020-12-11 DIAGNOSIS — M15.0 PRIMARY OSTEOARTHRITIS INVOLVING MULTIPLE JOINTS: Primary | ICD-10-CM

## 2020-12-11 DIAGNOSIS — G30.1 LATE ONSET ALZHEIMER'S DISEASE WITHOUT BEHAVIORAL DISTURBANCE (H): ICD-10-CM

## 2020-12-11 DIAGNOSIS — F02.80 LATE ONSET ALZHEIMER'S DISEASE WITHOUT BEHAVIORAL DISTURBANCE (H): ICD-10-CM

## 2020-12-11 PROCEDURE — 99309 SBSQ NF CARE MODERATE MDM 30: CPT | Performed by: INTERNAL MEDICINE

## 2020-12-11 ASSESSMENT — MIFFLIN-ST. JEOR: SCORE: 1138.45

## 2020-12-11 NOTE — LETTER
"    12/11/2020        RE: Lesvia Barney  [facility]  [address]  [facility] [address]        Lesvia Barney is a 89 year old female seen December 11, 2020 at [facility] where she has resided for 5 years (admit to Board and Care 8/2015) seen for regulatory visit.   Pt is seen in her room up to chair flipping through a magazine.  She is pleasant and in good spirits, states she is feeling well.  Reports her usual \"I'm going home today.\"   No new concerns reported by nursing staff.     Pt has many years of gradually progressive Alzheimer's dementia with general decline functionally.    No longer as well put together as she used to be, refuses bathing or showers at times.    She remains ambulatory with a 4WW but with significant deformity of LE joints, flexed at knees and hips, forward stoop.   Has become a difficult transfer.      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    Her POA is tony Barney [phone]    ROS:  +incontinence; BIMS 8/15  Wt Readings from Last 5 Encounters:   12/11/20 69.6 kg (153 lb 6.4 oz)   09/25/20 68.3 kg (150 lb 9.6 oz)   06/04/20 61.5 kg (135 lb 9.6 oz)   08/17/20 68.2 kg (150 lb 6.4 oz)   06/30/20 66.8 kg (147 lb 4.8 oz)      EXAM:  NAD, alert and oriented x1  /62   Pulse 88   Temp 97.7  F (36.5  C)   Resp 20   Ht 1.702 m (5' 7\")   Wt 69.6 kg (153 lb 6.4 oz)   SpO2 94%   BMI 24.03 kg/m     Neck supple without adenopathy  Lungs with decreased BS, no rales or wheeze  Heart RRR s1s2, without murmur  Abd soft, NT, no distention, +BS, no masses, no rebound or guarding.     Ext trace ankle edema L>R, valgus deformity of " left knee, and chronic hip flexion  Neuro: no tremor or stiffness, +wordfinding difficulty.  Psych: affect anxious    Labs reviewed.     IMP/PLAN:  (D45) Primary polycythemia (H)  Comment: numbers improved, no h/o lung disease or hypoxia  Plan: She is not a candidate for a workup; recheck CBC prn only     She is on daily aspirin 81 mg as he only medication.          (M15.0) Primary osteoarthritis involving multiple joints   Comment: significant deforming changes of OA in LE joints, but no reports of pain.  Plan: Assist with transfers.   Ambulation with walker and directional cuing  Local measures and prn acetaminophen should she c/o pain.         (G30.1,  F02.80) Late onset Alzheimer's disease without behavioral disturbance  Comment:  gradual decline over past few years, low functional status  She can usually be redirected and has not needed pharmacologic intervention to date.     Plan: Board and Care for assist with meals, meds, cares, activity.          Advanced directives: DNR/DNI.        Nabila Velasquez MD           Sincerely,        Nabila Velasquez MD

## 2020-12-20 NOTE — PROGRESS NOTES
"Lesvia Barney is a 89 year old female seen December 11, 2020 at [facility] where she has resided for 5 years (admit to Board and Care 8/2015) seen for regulatory visit.   Pt is seen in her room up to chair flipping through a magazine.  She is pleasant and in good spirits, states she is feeling well.  Reports her usual \"I'm going home today.\"   No new concerns reported by nursing staff.     Pt has many years of gradually progressive Alzheimer's dementia with general decline functionally.    No longer as well put together as she used to be, refuses bathing or showers at times.    She remains ambulatory with a 4WW but with significant deformity of LE joints, flexed at knees and hips, forward stoop.   Has become a difficult transfer.      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    Her POA is tony Barney [phone]    ROS:  +incontinence; BIMS 8/15  Wt Readings from Last 5 Encounters:   12/11/20 69.6 kg (153 lb 6.4 oz)   09/25/20 68.3 kg (150 lb 9.6 oz)   06/04/20 61.5 kg (135 lb 9.6 oz)   08/17/20 68.2 kg (150 lb 6.4 oz)   06/30/20 66.8 kg (147 lb 4.8 oz)      EXAM:  NAD, alert and oriented x1  /62   Pulse 88   Temp 97.7  F (36.5  C)   Resp 20   Ht 1.702 m (5' 7\")   Wt 69.6 kg (153 lb 6.4 oz)   SpO2 94%   BMI 24.03 kg/m     Neck supple without adenopathy  Lungs with decreased BS, no rales or wheeze  Heart RRR s1s2, without murmur  Abd soft, NT, no distention, +BS, no masses, no rebound or guarding.     Ext trace ankle edema L>R, valgus deformity of left knee, and chronic hip flexion  Neuro: no tremor or stiffness, +wordfinding difficulty.  Psych: affect " anxious    Labs reviewed.     IMP/PLAN:  (D45) Primary polycythemia (H)  Comment: numbers improved, no h/o lung disease or hypoxia  Plan: She is not a candidate for a workup; recheck CBC prn only     She is on daily aspirin 81 mg as he only medication.          (M15.0) Primary osteoarthritis involving multiple joints   Comment: significant deforming changes of OA in LE joints, but no reports of pain.  Plan: Assist with transfers.   Ambulation with walker and directional cuing  Local measures and prn acetaminophen should she c/o pain.         (G30.1,  F02.80) Late onset Alzheimer's disease without behavioral disturbance  Comment:  gradual decline over past few years, low functional status  She can usually be redirected and has not needed pharmacologic intervention to date.     Plan: Board and Care for assist with meals, meds, cares, activity.          Advanced directives: DNR/DNI.        Nabila Velasquez MD

## 2021-01-01 ENCOUNTER — NURSING HOME VISIT (OUTPATIENT)
Dept: GERIATRICS | Facility: CLINIC | Age: 86
End: 2021-01-01
Payer: COMMERCIAL

## 2021-01-01 ENCOUNTER — VIRTUAL VISIT (OUTPATIENT)
Dept: GERIATRICS | Facility: CLINIC | Age: 86
End: 2021-01-01
Payer: COMMERCIAL

## 2021-01-01 VITALS
DIASTOLIC BLOOD PRESSURE: 78 MMHG | SYSTOLIC BLOOD PRESSURE: 143 MMHG | TEMPERATURE: 97.6 F | BODY MASS INDEX: 21.84 KG/M2 | RESPIRATION RATE: 18 BRPM | WEIGHT: 147.9 LBS | OXYGEN SATURATION: 95 % | HEART RATE: 78 BPM

## 2021-01-01 VITALS
BODY MASS INDEX: 21.86 KG/M2 | HEIGHT: 69 IN | DIASTOLIC BLOOD PRESSURE: 78 MMHG | OXYGEN SATURATION: 96 % | HEART RATE: 78 BPM | RESPIRATION RATE: 18 BRPM | SYSTOLIC BLOOD PRESSURE: 143 MMHG | TEMPERATURE: 97.3 F | WEIGHT: 147.6 LBS

## 2021-01-01 VITALS
OXYGEN SATURATION: 95 % | BODY MASS INDEX: 21.03 KG/M2 | DIASTOLIC BLOOD PRESSURE: 70 MMHG | RESPIRATION RATE: 20 BRPM | TEMPERATURE: 97.5 F | HEIGHT: 69 IN | SYSTOLIC BLOOD PRESSURE: 128 MMHG | HEART RATE: 67 BPM | WEIGHT: 142 LBS

## 2021-01-01 VITALS
TEMPERATURE: 97.5 F | OXYGEN SATURATION: 94 % | RESPIRATION RATE: 16 BRPM | BODY MASS INDEX: 21.41 KG/M2 | SYSTOLIC BLOOD PRESSURE: 123 MMHG | DIASTOLIC BLOOD PRESSURE: 76 MMHG | HEART RATE: 80 BPM | WEIGHT: 145 LBS

## 2021-01-01 VITALS
TEMPERATURE: 97.7 F | BODY MASS INDEX: 21.52 KG/M2 | RESPIRATION RATE: 18 BRPM | DIASTOLIC BLOOD PRESSURE: 76 MMHG | WEIGHT: 145.3 LBS | OXYGEN SATURATION: 94 % | SYSTOLIC BLOOD PRESSURE: 121 MMHG | HEIGHT: 69 IN

## 2021-01-01 DIAGNOSIS — F02.818 LATE ONSET ALZHEIMER'S DISEASE WITH BEHAVIORAL DISTURBANCE (H): Primary | ICD-10-CM

## 2021-01-01 DIAGNOSIS — G30.1 LATE ONSET ALZHEIMER'S DISEASE WITH BEHAVIORAL DISTURBANCE (H): ICD-10-CM

## 2021-01-01 DIAGNOSIS — K92.0 HEMATEMESIS WITHOUT NAUSEA: ICD-10-CM

## 2021-01-01 DIAGNOSIS — Z91.199 FAILURE TO ATTEND APPOINTMENT: Primary | ICD-10-CM

## 2021-01-01 DIAGNOSIS — M15.0 PRIMARY OSTEOARTHRITIS INVOLVING MULTIPLE JOINTS: ICD-10-CM

## 2021-01-01 DIAGNOSIS — D45 PRIMARY POLYCYTHEMIA (H): ICD-10-CM

## 2021-01-01 DIAGNOSIS — F69 ADULT BEHAVIOR PROBLEM: ICD-10-CM

## 2021-01-01 DIAGNOSIS — G30.1 LATE ONSET ALZHEIMER'S DISEASE WITH BEHAVIORAL DISTURBANCE (H): Primary | ICD-10-CM

## 2021-01-01 DIAGNOSIS — K92.0 COFFEE GROUND EMESIS: ICD-10-CM

## 2021-01-01 DIAGNOSIS — D62 ANEMIA DUE TO BLOOD LOSS, ACUTE: ICD-10-CM

## 2021-01-01 DIAGNOSIS — I10 ESSENTIAL HYPERTENSION: Primary | ICD-10-CM

## 2021-01-01 DIAGNOSIS — D45 PRIMARY POLYCYTHEMIA (H): Primary | ICD-10-CM

## 2021-01-01 DIAGNOSIS — F02.818 LATE ONSET ALZHEIMER'S DISEASE WITH BEHAVIORAL DISTURBANCE (H): ICD-10-CM

## 2021-01-01 DIAGNOSIS — K92.0 COFFEE GROUND EMESIS: Primary | ICD-10-CM

## 2021-01-01 PROCEDURE — 99309 SBSQ NF CARE MODERATE MDM 30: CPT | Performed by: INTERNAL MEDICINE

## 2021-01-01 PROCEDURE — 99309 SBSQ NF CARE MODERATE MDM 30: CPT | Performed by: NURSE PRACTITIONER

## 2021-01-01 PROCEDURE — 99207 PR CDG-CODE CATEGORY CHANGED: CPT | Performed by: NURSE PRACTITIONER

## 2021-01-01 PROCEDURE — 99309 SBSQ NF CARE MODERATE MDM 30: CPT | Mod: 95 | Performed by: NURSE PRACTITIONER

## 2021-01-01 RX ORDER — PANTOPRAZOLE SODIUM 40 MG/1
40 TABLET, DELAYED RELEASE ORAL DAILY
Start: 2021-01-01

## 2021-01-01 RX ORDER — ACETAMINOPHEN 500 MG
1000 TABLET ORAL EVERY 8 HOURS PRN
Start: 2021-01-01

## 2021-01-01 ASSESSMENT — MIFFLIN-ST. JEOR
SCORE: 1133.46
SCORE: 1113.49
SCORE: 1143.89
SCORE: 1136.63

## 2021-01-05 ENCOUNTER — NURSING HOME VISIT (OUTPATIENT)
Dept: GERIATRICS | Facility: CLINIC | Age: 86
End: 2021-01-05
Payer: COMMERCIAL

## 2021-01-05 ENCOUNTER — RECORDS - HEALTHEAST (OUTPATIENT)
Dept: LAB | Facility: CLINIC | Age: 86
End: 2021-01-05

## 2021-01-05 VITALS
BODY MASS INDEX: 24.01 KG/M2 | RESPIRATION RATE: 18 BRPM | HEIGHT: 67 IN | SYSTOLIC BLOOD PRESSURE: 145 MMHG | OXYGEN SATURATION: 96 % | WEIGHT: 153 LBS | HEART RATE: 70 BPM | DIASTOLIC BLOOD PRESSURE: 80 MMHG | TEMPERATURE: 98.2 F

## 2021-01-05 DIAGNOSIS — F02.818 LATE ONSET ALZHEIMER'S DISEASE WITH BEHAVIORAL DISTURBANCE (H): Primary | ICD-10-CM

## 2021-01-05 DIAGNOSIS — G30.1 LATE ONSET ALZHEIMER'S DISEASE WITH BEHAVIORAL DISTURBANCE (H): Primary | ICD-10-CM

## 2021-01-05 DIAGNOSIS — S20.01XD: ICD-10-CM

## 2021-01-05 DIAGNOSIS — M15.0 PRIMARY OSTEOARTHRITIS INVOLVING MULTIPLE JOINTS: ICD-10-CM

## 2021-01-05 DIAGNOSIS — F28 OTHER PSYCHOTIC DISORDER NOT DUE TO A SUBSTANCE OR KNOWN PHYSIOLOGICAL CONDITION (H): ICD-10-CM

## 2021-01-05 DIAGNOSIS — U07.1 COVID-19 VIRUS INFECTION: ICD-10-CM

## 2021-01-05 PROCEDURE — 99309 SBSQ NF CARE MODERATE MDM 30: CPT | Performed by: NURSE PRACTITIONER

## 2021-01-05 RX ORDER — QUETIAPINE FUMARATE 25 MG/1
12.5 TABLET, FILM COATED ORAL DAILY
Start: 2021-01-05 | End: 2021-03-16

## 2021-01-05 NOTE — LETTER
1/5/2021        RE: Lesvia Barney  [facility]  [address]  [facility] [address]        [place] [facility]  [place] Medical Record Number:  [id number]  Place of Service where encounter took place:  [facility] ([facility]) [[id number]]  Chief Complaint   Patient presents with     Nursing Home Acute       HPI:    Lesvia Barney  is a 89 year old (10/29/1928), who is being seen today for an episodic care visit.  HPI information obtained from: facility chart records, facility staff and patient report. Today's concern is:    ICD-10-CM    1. Late onset Alzheimer's disease with behavioral disturbance (H)  G30.1 QUEtiapine (SEROQUEL) 25 MG tablet    F02.81    2. COVID-19 virus infection  U07.1    3. Primary osteoarthritis involving multiple joints  M89.49    4. Traumatic ecchymosis of right female breast, subsequent encounter  S20.01XD    5. 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 Denies pain and unable to give details regarding etiology given degree of cognitive impairment.   -Has been demonstrating increased resistance with cares and is requiring assist of 2 with changing when soiled.   -Very pushy during visit and unable to assess given resistance. Using walker to push writer away and asked what the reason for my visit.   -Behaviors increased/began when residing on Covid-19 unit. No improvement seen. Appears to be sundowning.     Past Medical and Surgical History reviewed in Epic today.    MEDICATIONS:    Current Outpatient Medications   Medication Sig Dispense Refill     QUEtiapine (SEROQUEL) 25 MG tablet Take 0.5 tablets (12.5 mg) by mouth daily       aspirin 81 MG chewable tablet Take 1 tablet (81 mg) by mouth daily 108 tablet 3     REVIEW OF SYSTEMS:  Unobtainable secondary to cognitive impairment.     Objective:  BP (!) 145/80   Pulse 70   Temp 98.2  " F (36.8  C)   Resp 18   Ht 1.702 m (5' 7\")   Wt 69.4 kg (153 lb)   SpO2 96%   BMI 23.96 kg/m    Exam:  GENERAL APPEARANCE:  Alert  ENT:  Mouth and posterior oropharynx normal, moist mucous membranes, normal hearing acuity  RESP:  respiratory effort and palpation of chest normal, lungs clear to auscultation   CV:  Palpation and auscultation of heart done , regular rate and rhythm, no murmur, rub, or gallop  M/S:   Gait and station normal  Digits and nails normal  SKIN:  Large bruise lateral aspect of right breast   NEURO:   Cranial nerves 2-12 are normal tested and grossly at patient's baseline  PSYCH:  memory impaired     Labs:   Labs done in SNF are in [facility]. Please refer to them using [employer] Everywhere. and Recent labs in [facility] reviewed by me today.     ASSESSMENT/PLAN:  (G30.1,  F02.81) Late onset Alzheimer's disease with behavioral disturbance (H)  (primary encounter diagnosis)  Comment: Progressing, now with resistant behavior.   Plan:   -Seroquel 12.5 mg 1700. Monitor and update NP. Will follow up next week.     (U07.1) COVID-19 virus infection  Comment: Resolved   Plan:   -Monitor, no residual s/sx. Resident remained asymptomatic.     (M89.49) Primary osteoarthritis involving multiple joints  Comment: Longstanding history. Managed on current medication regimen.   Plan:   -No changes at this time. Monitor and update NP as indicated.     (S20.01XD) Traumatic ecchymosis of right female breast, subsequent encounter  Comment: Unknown etiology, no pain noted.   Plan: Monitor and update with changes.     Orders:    Seroquel 12.5 mg daily at 1700 related to increased behaviors.       Electronically signed by:  GI Ash [facility]           Sincerely,        Lexii Reid NP    "

## 2021-01-05 NOTE — PROGRESS NOTES
"[place] [facility]  [place] Medical Record Number:  [id number]  Place of Service where encounter took place:  [facility] (FGS) [[id number]]  Chief Complaint   Patient presents with     Nursing Home Acute       HPI:    Lesvia Barney  is a 89 year old (10/29/1928), who is being seen today for an episodic care visit.  HPI information obtained from: facility chart records, facility staff and patient report. Today's concern is:    ICD-10-CM    1. Late onset Alzheimer's disease with behavioral disturbance (H)  G30.1 QUEtiapine (SEROQUEL) 25 MG tablet    F02.81    2. COVID-19 virus infection  U07.1    3. Primary osteoarthritis involving multiple joints  M89.49    4. Traumatic ecchymosis of right female breast, subsequent encounter  S20.01XD    5. 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 Denies pain and unable to give details regarding etiology given degree of cognitive impairment.   -Has been demonstrating increased resistance with cares and is requiring assist of 2 with changing when soiled.   -Very pushy during visit and unable to assess given resistance. Using walker to push writer away and asked what the reason for my visit.   -Behaviors increased/began when residing on Covid-19 unit. No improvement seen. Appears to be sundowning.     Past Medical and Surgical History reviewed in Epic today.    MEDICATIONS:    Current Outpatient Medications   Medication Sig Dispense Refill     QUEtiapine (SEROQUEL) 25 MG tablet Take 0.5 tablets (12.5 mg) by mouth daily       aspirin 81 MG chewable tablet Take 1 tablet (81 mg) by mouth daily 108 tablet 3     REVIEW OF SYSTEMS:  Unobtainable secondary to cognitive impairment.     Objective:  BP (!) 145/80   Pulse 70   Temp 98.2  F (36.8  C)   Resp 18   Ht 1.702 m (5' 7\")   Wt 69.4 kg (153 lb)   SpO2 96%   BMI 23.96 kg/m    Exam:  GENERAL " APPEARANCE:  Alert  ENT:  Mouth and posterior oropharynx normal, moist mucous membranes, normal hearing acuity  RESP:  respiratory effort and palpation of chest normal, lungs clear to auscultation   CV:  Palpation and auscultation of heart done , regular rate and rhythm, no murmur, rub, or gallop  M/S:   Gait and station normal  Digits and nails normal  SKIN:  Large bruise lateral aspect of right breast   NEURO:   Cranial nerves 2-12 are normal tested and grossly at patient's baseline  PSYCH:  memory impaired     Labs:   Labs done in SNF are in [facility]. Please refer to them using [employer]/Care Everywhere. and Recent labs in [facility] reviewed by me today.     ASSESSMENT/PLAN:  (G30.1,  F02.81) Late onset Alzheimer's disease with behavioral disturbance (H)  (primary encounter diagnosis)  Comment: Progressing, now with resistant behavior.   Plan:   -Seroquel 12.5 mg 1700. Monitor and update NP. Will follow up next week.     (U07.1) COVID-19 virus infection  Comment: Resolved   Plan:   -Monitor, no residual s/sx. Resident remained asymptomatic.     (M89.49) Primary osteoarthritis involving multiple joints  Comment: Longstanding history. Managed on current medication regimen.   Plan:   -No changes at this time. Monitor and update NP as indicated.     (S20.01XD) Traumatic ecchymosis of right female breast, subsequent encounter  Comment: Unknown etiology, no pain noted.   Plan: Monitor and update with changes.     Orders:    Seroquel 12.5 mg daily at 1700 related to increased behaviors.       Electronically signed by:  GI Ash CNP  [facility]

## 2021-01-07 ENCOUNTER — VIRTUAL VISIT (OUTPATIENT)
Dept: GERIATRICS | Facility: CLINIC | Age: 86
End: 2021-01-07
Payer: COMMERCIAL

## 2021-01-07 ENCOUNTER — TRANSFERRED RECORDS (OUTPATIENT)
Dept: HEALTH INFORMATION MANAGEMENT | Facility: CLINIC | Age: 86
End: 2021-01-07

## 2021-01-07 VITALS — TEMPERATURE: 97.7 F | OXYGEN SATURATION: 97 %

## 2021-01-07 DIAGNOSIS — G30.1 LATE ONSET ALZHEIMER'S DISEASE WITH BEHAVIORAL DISTURBANCE (H): Primary | ICD-10-CM

## 2021-01-07 DIAGNOSIS — F02.818 LATE ONSET ALZHEIMER'S DISEASE WITH BEHAVIORAL DISTURBANCE (H): Primary | ICD-10-CM

## 2021-01-07 LAB
ANION GAP SERPL CALCULATED.3IONS-SCNC: 9 MMOL/L (ref 5–18)
ANION GAP SERPL CALCULATED.3IONS-SCNC: 9 MMOL/L (ref 5–18)
BASOPHILS # BLD AUTO: 0 THOU/UL (ref 0–0.2)
BASOPHILS NFR BLD AUTO: 0 % (ref 0–2)
BUN SERPL-MCNC: 12 MG/DL (ref 8–28)
BUN SERPL-MCNC: 12 MG/DL (ref 8–28)
CALCIUM SERPL-MCNC: 9.7 MG/DL (ref 8.5–10.5)
CALCIUM SERPL-MCNC: 9.7 MG/DL (ref 8.5–10.5)
CHLORIDE BLD-SCNC: 108 MMOL/L (ref 98–107)
CHLORIDE SERPLBLD-SCNC: 108 MMOL/L (ref 98–107)
CO2 SERPL-SCNC: 26 MMOL/L (ref 22–31)
CO2 SERPL-SCNC: 26 MMOL/L (ref 22–31)
CREAT SERPL-MCNC: 0.78 MG/DL (ref 0.6–1.1)
CREAT SERPL-MCNC: 0.78 MG/DL (ref 0.6–1.1)
DIFFERENTIAL: ABNORMAL
EOSINOPHIL # BLD AUTO: 0.1 THOU/UL (ref 0–0.4)
EOSINOPHIL NFR BLD AUTO: 1 % (ref 0–6)
ERYTHROCYTE [DISTWIDTH] IN BLOOD BY AUTOMATED COUNT: 14.9 % (ref 11–14.5)
ERYTHROCYTE [DISTWIDTH] IN BLOOD BY AUTOMATED COUNT: 14.9 % (ref 11–14.5)
GFR SERPL CREATININE-BSD FRML MDRD: >60 ML/MIN/1.73M2
GFR SERPL CREATININE-BSD FRML MDRD: >60 ML/MIN/1.73M2
GLUCOSE BLD-MCNC: 63 MG/DL (ref 70–125)
GLUCOSE SERPL-MCNC: 63 MG/DL (ref 70–125)
HCT VFR BLD AUTO: 49.1 % (ref 35–47)
HCT VFR BLD AUTO: 49.1 % (ref 35–47)
HEMOGLOBIN: 15.4 G/DL (ref 12–16)
HGB BLD-MCNC: 15.4 G/DL (ref 12–16)
IMM GRANULOCYTES # BLD: 0.1 THOU/UL
IMM GRANULOCYTES NFR BLD: 1 %
LYMPHOCYTES # BLD AUTO: 1.7 THOU/UL (ref 0.8–4.4)
LYMPHOCYTES NFR BLD AUTO: 20 % (ref 20–40)
MCH RBC QN AUTO: 30 PG (ref 27–34)
MCH RBC QN AUTO: 30 PG (ref 27–34)
MCHC RBC AUTO-ENTMCNC: 31.4 G/DL (ref 32–36)
MCHC RBC AUTO-ENTMCNC: 31.4 G/DL (ref 32–36)
MCV RBC AUTO: 96 FL (ref 80–100)
MCV RBC AUTO: 96 FL (ref 80–100)
MONOCYTES # BLD AUTO: 0.7 THOU/UL (ref 0–0.9)
MONOCYTES NFR BLD AUTO: 8 % (ref 2–10)
NEUTROPHILS # BLD AUTO: 5.9 THOU/UL (ref 2–7.7)
NEUTROPHILS NFR BLD AUTO: 70 % (ref 50–70)
PLATELET # BLD AUTO: 289 THOU/UL (ref 140–440)
PLATELET # BLD AUTO: 289 THOU/UL (ref 140–440)
PMV BLD AUTO: 9.6 FL (ref 8.5–12.5)
POTASSIUM BLD-SCNC: 4.6 MMOL/L (ref 3.5–5)
POTASSIUM SERPL-SCNC: 4.6 MMOL/L (ref 3.5–5)
RBC # BLD AUTO: 5.14 MILL/UL (ref 3.8–5.4)
RBC # BLD AUTO: 5.14 MILL/UL (ref 3.8–5.4)
SODIUM SERPL-SCNC: 143 MMOL/L (ref 136–145)
SODIUM SERPL-SCNC: 143 MMOL/L (ref 136–145)
WBC # BLD AUTO: 8.4 THOU/UL (ref 4–11)
WBC: 8.4 THOU/UL (ref 4–11)

## 2021-01-07 PROCEDURE — 99358 PROLONG SERVICE W/O CONTACT: CPT | Mod: 95 | Performed by: NURSE PRACTITIONER

## 2021-01-07 NOTE — PROGRESS NOTES
[place] [facility]  NON-FACE TO FACE PROLONGED SERVICE    Lesvia Barney 10/29/1928    Date of Related Face to Face Service: 1/5/2021    INTENT OF SERVICE  This is an extended evaluation of patient's specific problem.  The service relates to a recent or future E/M visit.  Documentation supports medical necessity, relates to ongoing patient management and documents start times and stop times.    Regarding the following diagnoses:  Late onset Alzheimer's disease with behavioral disturbance (H),   * I reviewed records for patient's complicated medical history.  (Start time 1339  Stop time 1345) 6 minutes   * I discussed with César who is nephew of the patient.  (Start time 1409  Stop time 1437) 28 minutes   * I coordinated care with Jennie SMITH RN facility member.  (Start time 1440  Stop time 1453) 13 minutes     ASSESSMENT/PLAN  Late onset Alzheimer's disease with behavioral disturbance (H)    Talked with nephew César regarding worsening behaviors and possible initiation of seroquel. Educated on dementia stages and other possible differential diagnosis for behaviors in older adults.     POC was developed to start acetaminophen 1000 mg PO every evening to r/o pain. Will document sleep x2 nights and monitor bowel movements.     BMP and CBC next lab day for routine monitoring.     Follow up on 1/12/21    New orders were communicated with nurse manager Jennie at time of this encounter.       TIME  Total time spent with Non-Face to Face prolonged service 47  minutes.     Electronically signed by:  GI Ash CNP  [facility]

## 2021-01-07 NOTE — LETTER
1/7/2021        RE: Lesvia Christie [facility]  [address]  [facility] [address]        [place] [facility]  NON-FACE TO FACE PROLONGED SERVICE    Lesvia Barney 10/29/1928    Date of Related Face to Face Service: 1/5/2021    INTENT OF SERVICE  This is an extended evaluation of patient's specific problem.  The service relates to a recent or future E/M visit.  Documentation supports medical necessity, relates to ongoing patient management and documents start times and stop times.    Regarding the following diagnoses:  Late onset Alzheimer's disease with behavioral disturbance (H),   * I reviewed records for patient's complicated medical history.  (Start time 1339  Stop time 1345) 6 minutes   * I discussed with César who is nephew of the patient.  (Start time 1409  Stop time 1437) 28 minutes   * I coordinated care with Jennie SMITH RN facility member.  (Start time 1440  Stop time 1453) 13 minutes     ASSESSMENT/PLAN  Late onset Alzheimer's disease with behavioral disturbance (H)    Talked with nephew César regarding worsening behaviors and possible initiation of seroquel. Educated on dementia stages and other possible differential diagnosis for behaviors in older adults.     POC was developed to start acetaminophen 1000 mg PO every evening to r/o pain. Will document sleep x2 nights and monitor bowel movements.     BMP and CBC next lab day for routine monitoring.     Follow up on 1/12/21    New orders were communicated with nurse manager Jennie at time of this encounter.       TIME  Total time spent with Non-Face to Face prolonged service 47  minutes.     Electronically signed by:  GI Ash CNP  [facility]                 Sincerely,        Lexii Reid NP

## 2021-01-08 ASSESSMENT — MIFFLIN-ST. JEOR: SCORE: 1140.72

## 2021-01-12 ENCOUNTER — NURSING HOME VISIT (OUTPATIENT)
Dept: GERIATRICS | Facility: CLINIC | Age: 86
End: 2021-01-12
Payer: COMMERCIAL

## 2021-01-12 VITALS
WEIGHT: 153.9 LBS | OXYGEN SATURATION: 96 % | RESPIRATION RATE: 18 BRPM | HEIGHT: 67 IN | DIASTOLIC BLOOD PRESSURE: 64 MMHG | BODY MASS INDEX: 24.16 KG/M2 | TEMPERATURE: 97.3 F | SYSTOLIC BLOOD PRESSURE: 135 MMHG | HEART RATE: 67 BPM

## 2021-01-12 DIAGNOSIS — F02.818 LATE ONSET ALZHEIMER'S DISEASE WITH BEHAVIORAL DISTURBANCE (H): Primary | ICD-10-CM

## 2021-01-12 DIAGNOSIS — F28 OTHER PSYCHOTIC DISORDER NOT DUE TO A SUBSTANCE OR KNOWN PHYSIOLOGICAL CONDITION (H): ICD-10-CM

## 2021-01-12 DIAGNOSIS — M15.0 PRIMARY OSTEOARTHRITIS INVOLVING MULTIPLE JOINTS: ICD-10-CM

## 2021-01-12 DIAGNOSIS — G30.1 LATE ONSET ALZHEIMER'S DISEASE WITH BEHAVIORAL DISTURBANCE (H): Primary | ICD-10-CM

## 2021-01-12 PROCEDURE — 99310 SBSQ NF CARE HIGH MDM 45: CPT | Performed by: NURSE PRACTITIONER

## 2021-01-12 PROCEDURE — 99207 PR CDG-MDM COMPONENT: MEETS HIGH - UP CODED: CPT | Performed by: NURSE PRACTITIONER

## 2021-01-12 NOTE — LETTER
1/12/2021        RE: Lesvia Barney  [facility]  [address]  [facility] [address]        [place] [facility]  [place] Medical Record Number:  [id number]  Place of Service where encounter took place:  [facility] (FGS) [[id number]]  Chief Complaint   Patient presents with     Nursing Home Acute       HPI:    Lesvia Barney  is a 89 year old (10/29/1928), who is being seen today for an episodic care visit.  HPI information obtained from: facility chart records, facility staff, patient report, [facility] chart review and family/first contact César (nephrodolfo) report.     Today's concern is:    ICD-10-CM    1. Late onset Alzheimer's disease with behavioral disturbance (H)  G30.1     F02.81    2. 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 Primary osteoarthritis involving multiple joints  M89.49      Per staff, resident with increased behaviors including resistance to staff and guarding. Intermittently uses walker to guard self or push others away.  Is taking up to 2-3 staff members to help with cares.     We performed 3 day trial of monitoring resident's bowel movements, pain levels, and sleep pattern. Was started on increased dose of acetaminophen to eliminate pain as possible culprit. Behaviors continued without change.    Ena Lindsey is aware of POA. We discussed plan for small dose of daily seroquel with monitoring.     BP Readings from Last 3 Encounters:   01/08/21 135/64   01/01/21 (!) 145/80   12/11/20 118/62     Pulse Readings from Last 4 Encounters:   01/08/21 67   01/01/21 70   12/11/20 88   09/25/20 68     Wt Readings from Last 4 Encounters:   01/08/21 69.8 kg (153 lb 14.4 oz)   01/01/21 69.4 kg (153 lb)   12/11/20 69.6 kg (153 lb 6.4 oz)   09/25/20 68.3 kg (150 lb 9.6 oz)            Past Medical and Surgical History reviewed in Epic today.    MEDICATIONS:    Current Outpatient Medications   Medication Sig  "Dispense Refill     aspirin 81 MG chewable tablet Take 1 tablet (81 mg) by mouth daily 108 tablet 3     QUEtiapine (SEROQUEL) 25 MG tablet Take 0.5 tablets (12.5 mg) by mouth daily           REVIEW OF SYSTEMS:  Unobtainable secondary to cognitive impairment.     Objective:  /64   Pulse 67   Temp 97.3  F (36.3  C)   Resp 18   Ht 1.702 m (5' 7\")   Wt 69.8 kg (153 lb 14.4 oz)   SpO2 96%   BMI 24.10 kg/m    Exam:  GENERAL APPEARANCE:  Alert  ENT:  Mouth and posterior oropharynx normal, moist mucous membranes, [place]  RESP:  respiratory effort and palpation of chest normal, lungs clear to auscultation , no respiratory distress  CV:  Palpation and auscultation of heart done , regular rate and rhythm, no murmur, rub, or gallop  SKIN:  Inspection of skin and subcutaneous tissue baseline  NEURO:   Cranial nerves 2-12 are normal tested and grossly at patient's baseline  PSYCH:  insight and judgement impaired, memory impaired     Labs:   Labs done in SNF are in [place] [employer]. Please refer to them using [employer]/Care Everywhere. and Recent labs in [facility] reviewed by me today.     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   Plan:     Seroquel 12.5 mg/day. Monitor closely and update NP with changes.     Labs WNL on 1/8/2021    (M89.49) Primary osteoarthritis involving multiple joints  Comment: Chronic, stable.   Plan:     No change managed on current medications.       Orders:    Seroquel 12.5 mg/day      Total time with patient visit: 40 minutes including discussions about the POC and care coordination with the patient and family, Desean Lindsey. Greater than 50% of total time spent with counseling and coordinating care due to new start of medication. Discussed side effect profile and current condition. "           Electronically signed by:  GI Ash CNP  [facility]           Sincerely,        Lexii Reid, NP

## 2021-01-12 NOTE — PROGRESS NOTES
[place] [facility]  [place] Medical Record Number:  [id number]  Place of Service where encounter took place:  [facility] (FGS) [[id number]]  Chief Complaint   Patient presents with     Nursing Home Acute       HPI:    Lesvia Barney  is a 89 year old (10/29/1928), who is being seen today for an episodic care visit.  HPI information obtained from: facility chart records, facility staff, patient report, [facility] chart review and family/first contact César (nephrodolfo) report.     Today's concern is:    ICD-10-CM    1. Late onset Alzheimer's disease with behavioral disturbance (H)  G30.1     F02.81    2. 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 Primary osteoarthritis involving multiple joints  M89.49      Per staff, resident with increased behaviors including resistance to staff and guarding. Intermittently uses walker to guard self or push others away.  Is taking up to 2-3 staff members to help with cares.     We performed 3 day trial of monitoring resident's bowel movements, pain levels, and sleep pattern. Was started on increased dose of acetaminophen to eliminate pain as possible culprit. Behaviors continued without change.    Ena Lindsey is aware of POA. We discussed plan for small dose of daily seroquel with monitoring.     BP Readings from Last 3 Encounters:   01/08/21 135/64   01/01/21 (!) 145/80   12/11/20 118/62     Pulse Readings from Last 4 Encounters:   01/08/21 67   01/01/21 70   12/11/20 88   09/25/20 68     Wt Readings from Last 4 Encounters:   01/08/21 69.8 kg (153 lb 14.4 oz)   01/01/21 69.4 kg (153 lb)   12/11/20 69.6 kg (153 lb 6.4 oz)   09/25/20 68.3 kg (150 lb 9.6 oz)            Past Medical and Surgical History reviewed in Epic today.    MEDICATIONS:    Current Outpatient Medications   Medication Sig Dispense Refill     aspirin 81 MG chewable tablet Take 1 tablet (81 mg) by mouth daily 108 tablet 3     QUEtiapine  "(SEROQUEL) 25 MG tablet Take 0.5 tablets (12.5 mg) by mouth daily           REVIEW OF SYSTEMS:  Unobtainable secondary to cognitive impairment.     Objective:  /64   Pulse 67   Temp 97.3  F (36.3  C)   Resp 18   Ht 1.702 m (5' 7\")   Wt 69.8 kg (153 lb 14.4 oz)   SpO2 96%   BMI 24.10 kg/m    Exam:  GENERAL APPEARANCE:  Alert  ENT:  Mouth and posterior oropharynx normal, moist mucous membranes, [place]  RESP:  respiratory effort and palpation of chest normal, lungs clear to auscultation , no respiratory distress  CV:  Palpation and auscultation of heart done , regular rate and rhythm, no murmur, rub, or gallop  SKIN:  Inspection of skin and subcutaneous tissue baseline  NEURO:   Cranial nerves 2-12 are normal tested and grossly at patient's baseline  PSYCH:  insight and judgement impaired, memory impaired     Labs:   Labs done in SNF are in [facility]. Please refer to them using [employer]/Care Everywhere. and Recent labs in [facility] reviewed by me today.     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   Plan:     Seroquel 12.5 mg/day. Monitor closely and update NP with changes.     Labs WNL on 1/8/2021    (M89.49) Primary osteoarthritis involving multiple joints  Comment: Chronic, stable.   Plan:     No change managed on current medications.       Orders:    Seroquel 12.5 mg/day      Total time with patient visit: 40 minutes including discussions about the POC and care coordination with the patient and family, Desean Lindsey. Greater than 50% of total time spent with counseling and coordinating care due to new start of medication. Discussed side effect profile and current condition.           Electronically signed by:  GI Ash [facility]     "

## 2021-02-03 ENCOUNTER — RECORDS - HEALTHEAST (OUTPATIENT)
Dept: LAB | Facility: CLINIC | Age: 86
End: 2021-02-03

## 2021-02-04 LAB
ANION GAP SERPL CALCULATED.3IONS-SCNC: 10 MMOL/L (ref 5–18)
BUN SERPL-MCNC: 13 MG/DL (ref 8–28)
CALCIUM SERPL-MCNC: 9.1 MG/DL (ref 8.5–10.5)
CHLORIDE BLD-SCNC: 106 MMOL/L (ref 98–107)
CO2 SERPL-SCNC: 26 MMOL/L (ref 22–31)
CREAT SERPL-MCNC: 0.78 MG/DL (ref 0.6–1.1)
GFR SERPL CREATININE-BSD FRML MDRD: >60 ML/MIN/1.73M2
GLUCOSE BLD-MCNC: 57 MG/DL (ref 70–125)
POTASSIUM BLD-SCNC: 4.7 MMOL/L (ref 3.5–5)
SODIUM SERPL-SCNC: 142 MMOL/L (ref 136–145)

## 2021-02-12 ASSESSMENT — MIFFLIN-ST. JEOR: SCORE: 1103.52

## 2021-02-15 NOTE — PROGRESS NOTES
"[place] [facility]  Chief Complaint   Patient presents with     residential Regulatory     [place] Medical Record Number:  [id number]  Place of Service where encounter took place:  [facility] (FGS) [[id number]]    HPI:    Lesvia Barney  is 89 year old (10/29/1928), who is being seen today for a federally mandated E/M visit.  HPI information obtained from: facility chart records, facility staff, patient report and [facility] chart review.     Today's concerns are:      ICD-10-CM    1. Late onset Alzheimer's disease with behavioral disturbance (H)  G30.1     F02.81    2. 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 Primary osteoarthritis involving multiple joints  M89.49      Behaviors have improved since seroquel 12.5 mg/HS was added to medication regimen.     Had recent visit with  who reported resident was cordial and polite.     Allow assessment today which is improvement from last visit.     No indications of acute pain upon today's visit.     Wt Readings from Last 4 Encounters:   02/12/21 66.1 kg (145 lb 11.2 oz)   01/08/21 69.8 kg (153 lb 14.4 oz)   01/01/21 69.4 kg (153 lb)   12/11/20 69.6 kg (153 lb 6.4 oz)     BP Readings from Last 3 Encounters:   02/12/21 110/68   01/08/21 135/64   01/01/21 (!) 145/80     Pulse Readings from Last 4 Encounters:   02/12/21 80   01/08/21 67   01/01/21 70   12/11/20 88       Past Medical and Surgical History reviewed in Epic today.    MEDICATIONS:    Current Outpatient Medications   Medication Sig Dispense Refill     aspirin 81 MG chewable tablet Take 1 tablet (81 mg) by mouth daily 108 tablet 3     QUEtiapine (SEROQUEL) 25 MG tablet Take 0.5 tablets (12.5 mg) by mouth daily           REVIEW OF SYSTEMS:  Unobtainable secondary to cognitive impairment.     Objective:  /68   Pulse 80   Temp 97.5  F (36.4  C)   Resp 18   Ht 1.702 m (5' 7\")   Wt 66.1 kg (145 lb 11.2 oz)   SpO2 94%   " BMI 22.82 kg/m    Exam:  GENERAL APPEARANCE:  Alert  ENT:  Mouth and posterior oropharynx normal, moist mucous membranes, [place]  RESP:  respiratory effort and palpation of chest normal, lungs clear to auscultation , no respiratory distress  CV:  Palpation and auscultation of heart done , regular rate and rhythm, no murmur, rub, or gallop  SKIN:  Inspection of skin and subcutaneous tissue baseline  NEURO:   Cranial nerves 2-12 are normal tested and grossly at patient's baseline  PSYCH:  insight and judgement impaired, memory impaired     Labs:   Labs done in SNF are in [facility]. Please refer to them using [employer]/Care Everywhere. and Recent labs in [facility] reviewed by me today.     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 Improved with start of seroquel   Plan:     Continue without change at this time. Monitor behavior and mood and update NP with changes.     Continue memory care support with medication administration, meals and safety.     (M89.49) Primary osteoarthritis involving multiple joints  Comment: Chronic, stable.   Plan:     No change managed on current medications.       Orders:  The current medical regimen is effective; continue present plan and medications.        Total time with patient visit: 40 minutes including discussions about the POC and care coordination with the patient and family, Desean Lindsey. Greater than 50% of total time spent with counseling and coordinating care due to new start of medication. Discussed side effect profile and current condition.           Electronically signed by:  GI Ash CNP  [facility]

## 2021-02-16 ENCOUNTER — NURSING HOME VISIT (OUTPATIENT)
Dept: GERIATRICS | Facility: CLINIC | Age: 86
End: 2021-02-16
Payer: COMMERCIAL

## 2021-02-16 VITALS
TEMPERATURE: 97.5 F | WEIGHT: 145.7 LBS | HEIGHT: 67 IN | SYSTOLIC BLOOD PRESSURE: 110 MMHG | OXYGEN SATURATION: 94 % | DIASTOLIC BLOOD PRESSURE: 68 MMHG | BODY MASS INDEX: 22.87 KG/M2 | HEART RATE: 80 BPM | RESPIRATION RATE: 18 BRPM

## 2021-02-16 DIAGNOSIS — G30.1 LATE ONSET ALZHEIMER'S DISEASE WITH BEHAVIORAL DISTURBANCE (H): Primary | ICD-10-CM

## 2021-02-16 DIAGNOSIS — M15.0 PRIMARY OSTEOARTHRITIS INVOLVING MULTIPLE JOINTS: ICD-10-CM

## 2021-02-16 DIAGNOSIS — F02.818 LATE ONSET ALZHEIMER'S DISEASE WITH BEHAVIORAL DISTURBANCE (H): Primary | ICD-10-CM

## 2021-02-16 DIAGNOSIS — F28 OTHER PSYCHOTIC DISORDER NOT DUE TO A SUBSTANCE OR KNOWN PHYSIOLOGICAL CONDITION (H): ICD-10-CM

## 2021-02-16 PROCEDURE — 99309 SBSQ NF CARE MODERATE MDM 30: CPT | Performed by: NURSE PRACTITIONER

## 2021-02-16 NOTE — LETTER
2/16/2021        RE: Lesvia Barney  [facility]  [address]  [facility] [address]        [place] [facility]  Chief Complaint   Patient presents with     intermediate Regulatory     [place] Medical Record Number:  [id number]  Place of Service where encounter took place:  [facility] (FGS) [[id number]]    HPI:    Lesvia Barney  is 89 year old (10/29/1928), who is being seen today for a federally mandated E/M visit.  HPI information obtained from: facility chart records, facility staff, patient report and [facility] chart review.     Today's concerns are:      ICD-10-CM    1. Late onset Alzheimer's disease with behavioral disturbance (H)  G30.1     F02.81    2. 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 Primary osteoarthritis involving multiple joints  M89.49      Behaviors have improved since seroquel 12.5 mg/HS was added to medication regimen.     Had recent visit with  who reported resident was cordial and polite.     Allow assessment today which is improvement from last visit.     No indications of acute pain upon today's visit.     Wt Readings from Last 4 Encounters:   02/12/21 66.1 kg (145 lb 11.2 oz)   01/08/21 69.8 kg (153 lb 14.4 oz)   01/01/21 69.4 kg (153 lb)   12/11/20 69.6 kg (153 lb 6.4 oz)     BP Readings from Last 3 Encounters:   02/12/21 110/68   01/08/21 135/64   01/01/21 (!) 145/80     Pulse Readings from Last 4 Encounters:   02/12/21 80   01/08/21 67   01/01/21 70   12/11/20 88       Past Medical and Surgical History reviewed in Epic today.    MEDICATIONS:    Current Outpatient Medications   Medication Sig Dispense Refill     aspirin 81 MG chewable tablet Take 1 tablet (81 mg) by mouth daily 108 tablet 3     QUEtiapine (SEROQUEL) 25 MG tablet Take 0.5 tablets (12.5 mg) by mouth daily           REVIEW OF SYSTEMS:  Unobtainable secondary to cognitive impairment.     Objective:  BP  "110/68   Pulse 80   Temp 97.5  F (36.4  C)   Resp 18   Ht 1.702 m (5' 7\")   Wt 66.1 kg (145 lb 11.2 oz)   SpO2 94%   BMI 22.82 kg/m    Exam:  GENERAL APPEARANCE:  Alert  ENT:  Mouth and posterior oropharynx normal, moist mucous membranes, [place]  RESP:  respiratory effort and palpation of chest normal, lungs clear to auscultation , no respiratory distress  CV:  Palpation and auscultation of heart done , regular rate and rhythm, no murmur, rub, or gallop  SKIN:  Inspection of skin and subcutaneous tissue baseline  NEURO:   Cranial nerves 2-12 are normal tested and grossly at patient's baseline  PSYCH:  insight and judgement impaired, memory impaired     Labs:   Labs done in SNF are in [facility]. Please refer to them using [employer] Everywhere. and Recent labs in [facility] reviewed by me today.     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 Improved with start of seroquel   Plan:     Continue without change at this time. Monitor behavior and mood and update NP with changes.     Continue memory care support with medication administration, meals and safety.     (M89.49) Primary osteoarthritis involving multiple joints  Comment: Chronic, stable.   Plan:     No change managed on current medications.       Orders:  The current medical regimen is effective; continue present plan and medications.        Total time with patient visit: 40 minutes including discussions about the POC and care coordination with the patient and family, Desean Lindsey. Greater than 50% of total time spent with counseling and coordinating care due to new start of medication. Discussed side effect profile and current condition.           Electronically signed by:  GI Ash [facility] "           Sincerely,        Lexii Reid, NP

## 2021-02-24 RX ORDER — ACETAMINOPHEN 500 MG
1000 TABLET ORAL DAILY
Start: 2021-02-24 | End: 2021-03-16 | Stop reason: DRUGHIGH

## 2021-03-03 ENCOUNTER — CLINICAL UPDATE (OUTPATIENT)
Dept: PHARMACY | Facility: CLINIC | Age: 86
End: 2021-03-03
Payer: COMMERCIAL

## 2021-03-03 DIAGNOSIS — M15.0 PRIMARY OSTEOARTHRITIS INVOLVING MULTIPLE JOINTS: ICD-10-CM

## 2021-03-03 DIAGNOSIS — D45 PRIMARY POLYCYTHEMIA (H): Primary | ICD-10-CM

## 2021-03-03 DIAGNOSIS — F02.80 LATE ONSET ALZHEIMER'S DISEASE WITHOUT BEHAVIORAL DISTURBANCE (H): ICD-10-CM

## 2021-03-03 DIAGNOSIS — G30.1 LATE ONSET ALZHEIMER'S DISEASE WITHOUT BEHAVIORAL DISTURBANCE (H): ICD-10-CM

## 2021-03-03 PROCEDURE — 99207 PR NO CHARGE LOS: CPT | Performed by: PHARMACIST

## 2021-03-03 NOTE — PROGRESS NOTES
This patient's medication list and chart were reviewed as part of the service provided by [facility].    Assessment/Recommendations:  1. (Pain):  Appears per chart review that tylenol was increased to 1000mg daily in an attempt to see if increase would be helpful for pain possibly associated with behaviors, but no change was noted.  Consider reduction in tylenol dose due to no benefit with the increased dose.  2. (Dementia w/ behaviors):  Noted relatively recent addition of quetiapine 12.5mg daily in early January.  In another month, if target behaviors have remained stable, and pt is not presenting as threat to self or others, and no bothersome hallucinations or delusions, consider trial d'c and monitor.      Meena Lerner, Pharm.D.,[facility]  Board Certified Geriatric Pharmacist  Medication Therapy Management Pharmacist  [phone]

## 2021-03-09 ENCOUNTER — NURSING HOME VISIT (OUTPATIENT)
Dept: GERIATRICS | Facility: CLINIC | Age: 86
End: 2021-03-09
Payer: COMMERCIAL

## 2021-03-09 VITALS
RESPIRATION RATE: 17 BRPM | HEART RATE: 69 BPM | OXYGEN SATURATION: 95 % | DIASTOLIC BLOOD PRESSURE: 72 MMHG | TEMPERATURE: 97.9 F | SYSTOLIC BLOOD PRESSURE: 128 MMHG

## 2021-03-09 DIAGNOSIS — M15.0 PRIMARY OSTEOARTHRITIS INVOLVING MULTIPLE JOINTS: ICD-10-CM

## 2021-03-09 DIAGNOSIS — F02.818 LATE ONSET ALZHEIMER'S DISEASE WITH BEHAVIORAL DISTURBANCE (H): Primary | ICD-10-CM

## 2021-03-09 DIAGNOSIS — G30.1 LATE ONSET ALZHEIMER'S DISEASE WITH BEHAVIORAL DISTURBANCE (H): Primary | ICD-10-CM

## 2021-03-09 DIAGNOSIS — F28 OTHER PSYCHOTIC DISORDER NOT DUE TO A SUBSTANCE OR KNOWN PHYSIOLOGICAL CONDITION (H): ICD-10-CM

## 2021-03-09 PROCEDURE — 99309 SBSQ NF CARE MODERATE MDM 30: CPT | Performed by: NURSE PRACTITIONER

## 2021-03-09 NOTE — PROGRESS NOTES
[facility]  [place] Medical Record Number:  [id number]  Place of Service where encounter took place:  [facility] () [[id number]]  Chief Complaint   Patient presents with     Nursing Home Acute       HPI:    Lesvia Barney  is a 89 year old (10/29/1928), who is being seen today for an episodic care visit.  HPI information obtained from: facility chart records, facility staff and patient report. Today's concern is:    ICD-10-CM    1. Late onset Alzheimer's disease with behavioral disturbance (H)  G30.1     F02.81    2. 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 Primary osteoarthritis involving multiple joints  M89.49      -Resident continues to demonstrate behaviors. Now trying to lower herself to the floor during cares. Today, upon exam she was leaning back and trying to sit down. Is at a huge increased fall risk d/t new behavior.   -No pain noted in chart review. Today reports 0/10    BP Readings from Last 3 Encounters:   03/05/21 128/72   02/12/21 110/68   01/08/21 135/64     Wt Readings from Last 4 Encounters:   02/12/21 66.1 kg (145 lb 11.2 oz)   01/08/21 69.8 kg (153 lb 14.4 oz)   01/01/21 69.4 kg (153 lb)   12/11/20 69.6 kg (153 lb 6.4 oz)     Pulse Readings from Last 4 Encounters:   03/05/21 69   02/12/21 80   01/08/21 67   01/01/21 70         Past Medical and Surgical History reviewed in Epic today.    MEDICATIONS:    Current Outpatient Medications   Medication Sig Dispense Refill     acetaminophen (TYLENOL) 500 MG tablet Take 2 tablets (1,000 mg) by mouth daily       aspirin 81 MG chewable tablet Take 1 tablet (81 mg) by mouth daily 108 tablet 3     QUEtiapine (SEROQUEL) 25 MG tablet Take 0.5 tablets (12.5 mg) by mouth daily           REVIEW OF SYSTEMS:  Unobtainable secondary to cognitive impairment.     Objective:  /72   Pulse 69   Temp 97.9  F (36.6  C)   Resp 17   SpO2 95%   Exam:  GENERAL APPEARANCE:   Alert  ENT:  Mouth and posterior oropharynx normal, moist mucous membranes, normal hearing acuity  RESP:  respiratory effort and palpation of chest normal, lungs clear to auscultation   CV:  Palpation and auscultation of heart done , regular rate and rhythm, no murmur, rub, or gallop  M/S:   Gait and station normal  Digits and nails normal  SKIN:  Inspection of skin and subcutaneous tissue baseline, Palpation of skin and subcutaneous tissue baseline  NEURO:   Cranial nerves 2-12 are normal tested and grossly at patient's baseline  PSYCH:  insight and judgement impaired, memory impaired , affect and mood normal    Labs:   Labs done in SNF are in [facility]. Please refer to them using [employer]/Care Everywhere. and Recent labs in [facility] reviewed by me today.     ASSESSMENT/PLAN:    ICD-10-CM    1. Late onset Alzheimer's disease with behavioral disturbance (H)  G30.1     F02.81    2. 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 Primary osteoarthritis involving multiple joints  M89.49        Dementia progressing, now with increased behaviors placing resident at increased fall risk. See new orders below. Expect further decline with progression of disease. Continue board and care support with medication administration, meals and safety.     No pain noted upon visit or chart review.     Orders:  1.Zyprexa 2.5 mg/BID for behaviors/anxiety   2.) Change acetaminophen 1000 mg PO daily PRN  3.) Discontinue seroquel- no improvement seen      Electronically signed by:  GI Ash CNP  [facility]

## 2021-03-10 DIAGNOSIS — F41.9 ANXIETY: Primary | ICD-10-CM

## 2021-03-10 RX ORDER — OLANZAPINE 2.5 MG/1
2.5 TABLET, FILM COATED ORAL 2 TIMES DAILY
Qty: 60 TABLET | Refills: 0 | Status: SHIPPED | OUTPATIENT
Start: 2021-03-10 | End: 2021-03-30 | Stop reason: DRUGHIGH

## 2021-03-10 NOTE — TELEPHONE ENCOUNTER
Resident with increased behaviors and resistance to cares.     GI Ash [facility]

## 2021-03-16 RX ORDER — ACETAMINOPHEN 500 MG
1000 TABLET ORAL DAILY PRN
Start: 2021-03-16 | End: 2021-01-01

## 2021-03-19 ASSESSMENT — MIFFLIN-ST. JEOR: SCORE: 1106.7

## 2021-03-26 ENCOUNTER — NURSING HOME VISIT (OUTPATIENT)
Dept: GERIATRICS | Facility: CLINIC | Age: 86
End: 2021-03-26
Payer: COMMERCIAL

## 2021-03-26 VITALS
WEIGHT: 146.4 LBS | SYSTOLIC BLOOD PRESSURE: 143 MMHG | HEIGHT: 67 IN | DIASTOLIC BLOOD PRESSURE: 89 MMHG | OXYGEN SATURATION: 94 % | RESPIRATION RATE: 17 BRPM | BODY MASS INDEX: 22.98 KG/M2 | TEMPERATURE: 98.4 F | HEART RATE: 53 BPM

## 2021-03-26 DIAGNOSIS — F41.9 ANXIETY: Primary | ICD-10-CM

## 2021-03-26 DIAGNOSIS — R29.6 FALLS FREQUENTLY: ICD-10-CM

## 2021-03-26 DIAGNOSIS — F02.818 LATE ONSET ALZHEIMER'S DISEASE WITH BEHAVIORAL DISTURBANCE (H): ICD-10-CM

## 2021-03-26 DIAGNOSIS — F28 OTHER PSYCHOTIC DISORDER NOT DUE TO A SUBSTANCE OR KNOWN PHYSIOLOGICAL CONDITION (H): ICD-10-CM

## 2021-03-26 DIAGNOSIS — G30.1 LATE ONSET ALZHEIMER'S DISEASE WITH BEHAVIORAL DISTURBANCE (H): ICD-10-CM

## 2021-03-26 PROCEDURE — 99309 SBSQ NF CARE MODERATE MDM 30: CPT | Performed by: NURSE PRACTITIONER

## 2021-03-26 RX ORDER — OLANZAPINE 2.5 MG/1
2.5 TABLET, FILM COATED ORAL EVERY OTHER DAY
COMMUNITY

## 2021-03-26 NOTE — PROGRESS NOTES
"[place] [facility]  [place] Medical Record Number:  [id number]  Place of Service where encounter took place:  [facility] () [[id number]]  Chief Complaint   Patient presents with     Nursing Home Acute       HPI:    Lesvia Barney  is a 89 year old (10/29/1928), who is being seen today for an episodic care visit.  HPI information obtained from: facility chart records, facility staff and patient report. Today's concern is:    ICD-10-CM    1. Anxiety  F41.9    2. Late onset Alzheimer's disease with behavioral disturbance (H)  G30.1     F02.81    3. 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 Falls frequently  R29.6      Resident with frequent falls. Dementia has progressed and now is an assist of 2-3 with cares. Having behaviors which include intentionally falling backwards with cares. Was originally started on seroquel daily but had no affect on resident. Now taking zyprexa 2.5 mg/BID due to increased anxiety and agitation with cares. Has had several falls recently without injury.     Past Medical and Surgical History reviewed in Epic today.    MEDICATIONS:    Current Outpatient Medications   Medication Sig Dispense Refill     acetaminophen (TYLENOL) 500 MG tablet Take 2 tablets (1,000 mg) by mouth daily as needed for mild pain       aspirin 81 MG chewable tablet Take 1 tablet (81 mg) by mouth daily 108 tablet 3     OLANZapine (ZYPREXA) 2.5 MG tablet Take 1 tablet (2.5 mg) by mouth 2 times daily 60 tablet 0         REVIEW OF SYSTEMS:  Unobtainable secondary to cognitive impairment.     Objective:  BP (!) 143/89   Pulse 53   Temp 98.4  F (36.9  C)   Resp 17   Ht 1.702 m (5' 7\")   Wt 66.4 kg (146 lb 6.4 oz)   SpO2 94%   BMI 22.93 kg/m    Exam:  GENERAL APPEARANCE:  Alert  ENT:  Mouth and posterior oropharynx normal, moist mucous membranes, [place]  RESP:  respiratory effort and palpation of chest normal, lungs clear to auscultation   CV:  " Palpation and auscultation of heart done , regular rate and rhythm, no murmur, rub, or gallop  M/S:   Gait and station normal  Digits and nails normal  SKIN:  Inspection of skin and subcutaneous tissue baseline, Palpation of skin and subcutaneous tissue baseline  NEURO:   Cranial nerves 2-12 are normal tested and grossly at patient's baseline  PSYCH:  insight and judgement impaired, memory impaired , affect and mood normal    Labs:   Labs done in SNF are in [facility]. Please refer to them using [employer]/Care Everywhere.    ASSESSMENT/PLAN:    ICD-10-CM    1. Anxiety  F41.9    2. Late onset Alzheimer's disease with behavioral disturbance (H)  G30.1     F02.81    3. 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 Falls frequently  R29.6        Increased fall likely 2/2 progression of functional decline possibly exacerbated by initiation of Zyprexa. Resident requiring increased support with cares, now appropriate for LTC for safety.      PLAN:  transcribed by : Ana Harris MA  1. Decrease Zyprexa to 2.5 mg po daily.       Electronically signed by:  GI Ash CNP  [facility]

## 2021-04-01 ENCOUNTER — HOSPITAL ENCOUNTER (EMERGENCY)
Facility: CLINIC | Age: 86
Discharge: HOME | End: 2021-04-01
Attending: EMERGENCY MEDICINE | Admitting: EMERGENCY MEDICINE
Payer: COMMERCIAL

## 2021-04-01 VITALS
RESPIRATION RATE: 15 BRPM | OXYGEN SATURATION: 95 % | DIASTOLIC BLOOD PRESSURE: 77 MMHG | TEMPERATURE: 97 F | HEART RATE: 102 BPM | SYSTOLIC BLOOD PRESSURE: 115 MMHG

## 2021-04-01 DIAGNOSIS — K92.2 GASTROINTESTINAL HEMORRHAGE, UNSPECIFIED GASTROINTESTINAL HEMORRHAGE TYPE: ICD-10-CM

## 2021-04-01 LAB
ABO + RH BLD: NORMAL
ABO + RH BLD: NORMAL
ALBUMIN SERPL-MCNC: 3.1 G/DL (ref 3.4–5)
ALP SERPL-CCNC: 99 U/L (ref 40–150)
ALT SERPL W P-5'-P-CCNC: 19 U/L (ref 0–50)
ANION GAP SERPL CALCULATED.3IONS-SCNC: 4 MMOL/L (ref 3–14)
AST SERPL W P-5'-P-CCNC: 11 U/L (ref 0–45)
BASOPHILS # BLD AUTO: 0 10E9/L (ref 0–0.2)
BASOPHILS NFR BLD AUTO: 0.2 %
BILIRUB SERPL-MCNC: 0.4 MG/DL (ref 0.2–1.3)
BLD GP AB SCN SERPL QL: NORMAL
BLOOD BANK CMNT PATIENT-IMP: NORMAL
BUN SERPL-MCNC: 28 MG/DL (ref 7–30)
CALCIUM SERPL-MCNC: 9 MG/DL (ref 8.5–10.1)
CHLORIDE SERPL-SCNC: 109 MMOL/L (ref 94–109)
CO2 SERPL-SCNC: 27 MMOL/L (ref 20–32)
CREAT SERPL-MCNC: 0.99 MG/DL (ref 0.52–1.04)
DIFFERENTIAL METHOD BLD: ABNORMAL
EOSINOPHIL # BLD AUTO: 0 10E9/L (ref 0–0.7)
EOSINOPHIL NFR BLD AUTO: 0.2 %
ERYTHROCYTE [DISTWIDTH] IN BLOOD BY AUTOMATED COUNT: 14.7 % (ref 10–15)
GFR SERPL CREATININE-BSD FRML MDRD: 49 ML/MIN/{1.73_M2}
GLUCOSE SERPL-MCNC: 114 MG/DL (ref 70–99)
HCT VFR BLD AUTO: 47.3 % (ref 35–47)
HEMOCCULT STL QL: POSITIVE
HGB BLD-MCNC: 14.8 G/DL (ref 11.7–15.7)
IMM GRANULOCYTES # BLD: 0.1 10E9/L (ref 0–0.4)
IMM GRANULOCYTES NFR BLD: 0.5 %
INR PPP: 1.01 (ref 0.86–1.14)
LYMPHOCYTES # BLD AUTO: 1.7 10E9/L (ref 0.8–5.3)
LYMPHOCYTES NFR BLD AUTO: 13.1 %
MCH RBC QN AUTO: 29.4 PG (ref 26.5–33)
MCHC RBC AUTO-ENTMCNC: 31.3 G/DL (ref 31.5–36.5)
MCV RBC AUTO: 94 FL (ref 78–100)
MONOCYTES # BLD AUTO: 0.8 10E9/L (ref 0–1.3)
MONOCYTES NFR BLD AUTO: 6.1 %
NEUTROPHILS # BLD AUTO: 10.4 10E9/L (ref 1.6–8.3)
NEUTROPHILS NFR BLD AUTO: 79.9 %
NRBC # BLD AUTO: 0 10*3/UL
NRBC BLD AUTO-RTO: 0 /100
PLATELET # BLD AUTO: 259 10E9/L (ref 150–450)
POTASSIUM SERPL-SCNC: 4.9 MMOL/L (ref 3.4–5.3)
PROT SERPL-MCNC: 6.6 G/DL (ref 6.8–8.8)
RBC # BLD AUTO: 5.03 10E12/L (ref 3.8–5.2)
SODIUM SERPL-SCNC: 140 MMOL/L (ref 133–144)
SPECIMEN EXP DATE BLD: NORMAL
WBC # BLD AUTO: 13 10E9/L (ref 4–11)

## 2021-04-01 PROCEDURE — 86900 BLOOD TYPING SEROLOGIC ABO: CPT | Performed by: EMERGENCY MEDICINE

## 2021-04-01 PROCEDURE — 86901 BLOOD TYPING SEROLOGIC RH(D): CPT | Performed by: EMERGENCY MEDICINE

## 2021-04-01 PROCEDURE — 99283 EMERGENCY DEPT VISIT LOW MDM: CPT

## 2021-04-01 PROCEDURE — 86850 RBC ANTIBODY SCREEN: CPT | Performed by: EMERGENCY MEDICINE

## 2021-04-01 PROCEDURE — 80053 COMPREHEN METABOLIC PANEL: CPT | Performed by: EMERGENCY MEDICINE

## 2021-04-01 PROCEDURE — 85025 COMPLETE CBC W/AUTO DIFF WBC: CPT | Performed by: EMERGENCY MEDICINE

## 2021-04-01 PROCEDURE — 85610 PROTHROMBIN TIME: CPT | Performed by: EMERGENCY MEDICINE

## 2021-04-01 PROCEDURE — 82272 OCCULT BLD FECES 1-3 TESTS: CPT | Performed by: EMERGENCY MEDICINE

## 2021-04-01 PROCEDURE — 250N000013 HC RX MED GY IP 250 OP 250 PS 637: Performed by: EMERGENCY MEDICINE

## 2021-04-01 RX ORDER — PANTOPRAZOLE SODIUM 40 MG/1
40 TABLET, DELAYED RELEASE ORAL DAILY
Qty: 30 TABLET | Refills: 0 | Status: SHIPPED | OUTPATIENT
Start: 2021-04-01 | End: 2021-05-01

## 2021-04-01 RX ORDER — PANTOPRAZOLE SODIUM 40 MG/1
40 TABLET, DELAYED RELEASE ORAL ONCE
Status: COMPLETED | OUTPATIENT
Start: 2021-04-01 | End: 2021-04-01

## 2021-04-01 RX ADMIN — PANTOPRAZOLE SODIUM 40 MG: 40 TABLET, DELAYED RELEASE ORAL at 20:07

## 2021-04-01 RX ADMIN — OLANZAPINE 2.5 MG: 5 TABLET, ORALLY DISINTEGRATING ORAL at 16:31

## 2021-04-01 NOTE — PROGRESS NOTES
[place] [facility]  [place] Medical Record Number:  [id number]  Place of Service where encounter took place:  [facility] () [[id number]]  Chief Complaint   Patient presents with     Nursing Home Acute     ED F/U       HPI:    Lesvia Barney  is a 89 year old (10/29/1928), who is being seen today for an episodic care visit.  HPI information obtained from: facility chart records, facility staff and patient report.     Today's concern is:  Resident resting peacefully in bed upon visit. Was evaluated in ED yesterday after having a large coffee ground emesis at nursing facility. ED course included labs and blood occult stool test which was positive. Patient found to be hemodynamically stable and sent back to [facility] board and care.  ASA was discontinued and started on pantoprazole 40 mg/day x30 days.   -No further emesis per nursing facility documentation and staff.   -Appears at baseline cognition    BP Readings from Last 3 Encounters:   04/02/21 111/69   04/01/21 115/77   03/25/21 (!) 143/89     Pulse Readings from Last 4 Encounters:   04/02/21 84   04/01/21 102   03/19/21 53   03/05/21 69       CBC: WBC 13.0 (H), o/w WNL (HGB 14.8, )     CMP:  (H), GFR 49 (L), Albumin 3.1 (L), Protein Total 6.6 (L), o/w WNL (Creatinine 0.99)     INR: 1.01     Occult blood stool: Positive    Past Medical and Surgical History reviewed in Epic today.    MEDICATIONS:    Current Outpatient Medications   Medication Sig Dispense Refill     acetaminophen (TYLENOL) 500 MG tablet Take 2 tablets (1,000 mg) by mouth daily as needed for mild pain       aspirin 81 MG chewable tablet Take 1 tablet (81 mg) by mouth daily 108 tablet 3     Nutritional Supplements (ENSURE PLUS PO) Take 4 oz by mouth 3 times daily       OLANZapine (ZYPREXA) 2.5 MG tablet Take 2.5 mg by mouth At Bedtime       pantoprazole (PROTONIX) 40 MG EC tablet Take 1 tablet (40 mg) by mouth daily for 30 doses 30 tablet 0     REVIEW OF  "SYSTEMS:  Unobtainable secondary to cognitive impairment.  and Unobtainable secondary to aphasia.     Objective:  /69   Pulse 84   Temp 98.2  F (36.8  C)   Resp 16   Ht 1.702 m (5' 7\")   Wt 68.6 kg (151 lb 3.2 oz)   SpO2 97%   BMI 23.68 kg/m    Exam:  GENERAL APPEARANCE:  Alert, in no distress  ENT:  Mouth and posterior oropharynx normal, moist mucous membranes, normal hearing acuity  RESP:  respiratory effort and palpation of chest normal, lungs clear to auscultation   CV:  Palpation and auscultation of heart done , regular rate and rhythm, no murmur, rub, or gallop  M/S:   Gait and station normal  Digits and nails normal  SKIN:  Inspection of skin and subcutaneous tissue baseline, Palpation of skin and subcutaneous tissue baseline  NEURO:   Cranial nerves 2-12 are normal tested and grossly at patient's baseline  PSYCH:  insight and judgement impaired, memory impaired , affect and mood normal    Labs:   Labs done in SNF are in [facility]. Please refer to them using [employer]/Care Everywhere. and Recent labs in [facility] reviewed by me today.     ASSESSMENT/PLAN:    ICD-10-CM    1. Late onset Alzheimer's disease with behavioral disturbance (H)  G30.1     F02.81    2. Coffee ground emesis  K92.0    3. Falls frequently  R29.6        Isolated episode of hematemesis. Due to age, cognitive impairment and goals of care no EGD/Colonoscopy indicated. Possibly 2/2 ulcer given patient is taking low dose ASA daily.     Continue pantoprazole 40 mg/day x 30 days     CBC and BMP next lab day to ensure resident remains hemodynamically stable.     Continue zyprexa for behaviors.     Orders:  1. CBC with diff and BMP next lab day       Electronically signed by:  GI Ash [facility]     "

## 2021-04-01 NOTE — ED PROVIDER NOTES
History   Chief Complaint:  Nausea and Vomiting    History limited due to dementia       HPI   Lesvia Barney is a 89 year old female, with a history of alzheimer's disease, hypertension, and osteoarthritis, who presents to the ED for evaluation of nausea and vomiting. The patient lives at [facility] and was brought in via EMS. EMS reports the patient had one episode of dark emesis and the family became concerned and wanted her evaluated in the emergency department. Here in the emergency department, the patient cannot provide any significant history d/t her advanced alzheimer's disease. She says she is not in pain.       After the initial interview, I contacted the patient's care facility. They stated the patient had one large volume of coffee ground emesis this afternoon. Afterwards, she was acting at her baseline and had two health shakes. They noted no changes in bowel movements. She has not had any fever. They reported she does not consume alcohol or use illicit drugs.     Review of Systems   Unable to perform ROS: Dementia     Allergies:  No known drug allergies    Medications:    Aspirin  Zyprexa    Past Medical History:    Hypertension  Primary polycythemia  Osteoarthritis  Alzheimer's disease    Past Surgical History:    Appendectomy  Right hip arthroplasty  Left ORIF hip    Family History:    CAD  Prostate cancer    Social History:  PCP: Lexii Reid  Presents to the ED via EMS  Lives at [facility]    Physical Exam     Patient Vitals for the past 24 hrs:   BP Temp Temp src Pulse Resp SpO2   04/01/21 1905 90/53 -- -- 77 -- --   04/01/21 1830 101/55 -- -- 83 -- --   04/01/21 1439 112/74 97  F (36.1  C) Oral 92 15 96 %       Physical Exam  General: Resting on the bed.  Head: No obvious trauma to head.  Ears, Nose, Throat:  External ears normal.  Nose normal.  Eyes:  Conjunctivae clear.  Pupils are equal, round, and reactive.   Neck: Normal range of motion.  Neck  supple.   CV: Regular rate and rhythm.  No murmurs.      Respiratory: Effort normal and breath sounds normal.  No wheezing or crackles.   Gastrointestinal: Soft.  No distension. There is no tenderness.  There is no rigidity, no rebound and no guarding.   Neuro: Alert. Moving all extremities appropriately.  Normal speech.  States she feels fine.  Fighting examinations   Skin: Skin is warm and dry.  No rash noted.   Rectal: dark stools noted on exam     Emergency Department Course   Laboratory:  CBC: WBC 13.0 (H), o/w WNL (HGB 14.8, )    CMP:  (H), GFR 49 (L), Albumin 3.1 (L), Protein Total 6.6 (L), o/w WNL (Creatinine 0.99)    INR: 1.01    Occult blood stool: Positive    AB0/Rh type and screen: B Rh+. Antibodies Negative    Emergency Department Course:    Reviewed:  I reviewed the patient's nursing notes, vitals, past available medical records.     Assessments:  1450: I obtained history and examined the patient as noted above.     1504: I contacted the patient's care facility to obtain more information.     1508: I attempted to contact [facility], for more history and requests of care.    1512: I attempted to speak with the patient about a rectal exam, but she adamantly refused.     1514: I spoke to the patient's care facility about the patient's refusal of services.     1525: I contacted the patient's nephew, César, concerning the patient's presentation and findings.    1547: I contacted [facility], concerning the patient's presentation.    1615: I spoke with Lexii and she indicated that César wanted tests performed.    1907: I contacted César and told him the options and findings. He felt that discharge with PPI would be appropriate at this time.    1910: I contacted Lexii and updated her on the results as well as César's wishes.    Interventions:  1631: Zyprexa 2.5 mg PO  1915: Protonix 40 mg PO    Disposition:  The patient was discharged to home.    Impression & Plan    Medical Decision Making:  Lesvia Barney  is a 89 year old female, with advanced Alzheimer's disease and on aspirin, who presents with hematemesis. The patient had one large volume coffee ground emesis today at her nursing facility. VS are reassuring at baseline.  This is consistent with an upper gastrointestinal bleed after speaking with staff. Differential considered includes ulcer, gastritis, avm, tumor, esophagitis, variceal bleed, renzo-jaeger tear, ingestion, etc.  Patient is hemodynamically stable.  CBC shows stable hemoglobin 14.8, mild leukocytosis suspect stress demargination.  CMP shows no acute electrolyte, metabolic or renal dysfunction.  LFTs are reassuring no signs of liver disease.  Occult was positive confirming that there is likely an upper GI bleed.  Patient is currently on an aspirin and this is her most likely risk factor for GI bleed.  No history of chronic liver disease and INR is normal. Protonix given. No indication to start octreotide as my suspicion of variceal bleed is so low given patient's significant dementia and no drinking history. Given the patient's age and cognitive status, many conversations and consults were made between staff and the patient's nephew, who is technically the POA. We discussed quality of life, treatment, and what admission would consist of if she were to come in today.  We discussed that endoscopy would be the definitive diagnosis but given her severe dementia and age this is unlikely to be well tolerated by the patient and may worsen her dementia and/or delirium.  After discussing with the staff and nephew, they elected to have the patient follow up as an outpatient and start PPI. The patient will cease her aspirin medication and take the PPI as directed. She is vitally stable and has a normal hemoglobin count.  We discussed admission but family and caregivers at the nursing home voice concern this will likely worsen patients dementia/delirium.  In order to maximize patient's quality of life the  decision was made that she be best served going back to her care facility who knows her well and avoid admission.  Given that she is vitally stable with a stable hemoglobin this seems reasonable at this time.  The patient will follow with primary tomorrow. Return if vomiting blood continues, more than 1 more bloody stools, lightheaded when stands, worsening or new abdominal pain. Stable for discharge, questions answered. Strict instructions given for home.      Diagnosis:    ICD-10-CM    1. Gastrointestinal hemorrhage, unspecified gastrointestinal hemorrhage type  K92.2        Discharge Medications:  New Prescriptions    PANTOPRAZOLE (PROTONIX) 40 MG EC TABLET    Take 1 tablet (40 mg) by mouth daily for 30 doses     Scribe Disclosure:  I, Josue Wise, am serving as a scribe at 2:50 PM on 4/1/2021 to document services personally performed by Chloé Clemente MD based on my observations and the provider's statements to me.      Chloé Clemente MD  04/01/21 5851

## 2021-04-01 NOTE — ED TRIAGE NOTES
Pt brought in by EMS from [email or web address] Had one episode of emesis this AM, emesis was dark in color. Family wanted patient brought in. Pt has hx of dementia, not able to answer questions.

## 2021-04-01 NOTE — ED NOTES
Bed: ED01  Expected date:   Expected time:   Means of arrival:   Comments:  428  89 F emesis  1432

## 2021-04-01 NOTE — ED NOTES
[facility]  ED Nurse Handoff Report    ED Chief complaint: Nausea & Vomiting      ED Diagnosis:   Final diagnoses:   None       Code Status: not discussed by ED RN     Allergies:   Allergies   Allergen Reactions     No Known Allergies        Patient Story: 89F episode of coffee ground emesis  Focused Assessment:  Pt has severe dementia. Comes from a care facility. Had 1 large, coffee ground emesis today. Occult stool is positive. Pt not able to answer any questions and doesn't like any cares.     Treatments and/or interventions provided:   Patient's response to treatments and/or interventions: doesn't like any cares, states 'you're fired' often    To be done/followed up on inpatient unit:      Does this patient have any cognitive concerns?: Alzheimer's    Activity level - Baseline/Home:  Unknown  Activity Level - Current:   Unknown    Patient's Preferred language: English   Needed?: No    Isolation: None  Infection: Not Applicable  Patient tested for COVID 19 prior to admission: YES  Bariatric?: No    Vital Signs:   Vitals:    04/01/21 1439   BP: 112/74   Pulse: 92   Resp: 15   Temp: 97  F (36.1  C)   TempSrc: Oral   SpO2: 96%       Cardiac Rhythm:     Was the PSS-3 completed:   Yes  What interventions are required if any?               Family Comments:   OBS brochure/video discussed/provided to patient/family: N/A              Name of person given brochure if not patient:               Relationship to patient:     For the majority of the shift this patient's behavior was Green.   Behavioral interventions performed were .    ED NURSE PHONE NUMBER: [phone]

## 2021-04-02 ENCOUNTER — NURSING HOME VISIT (OUTPATIENT)
Dept: GERIATRICS | Facility: CLINIC | Age: 86
End: 2021-04-02
Payer: COMMERCIAL

## 2021-04-02 ENCOUNTER — TELEPHONE (OUTPATIENT)
Dept: GERIATRICS | Facility: CLINIC | Age: 86
End: 2021-04-02

## 2021-04-02 VITALS
DIASTOLIC BLOOD PRESSURE: 69 MMHG | WEIGHT: 151.2 LBS | TEMPERATURE: 98.2 F | RESPIRATION RATE: 16 BRPM | HEART RATE: 84 BPM | OXYGEN SATURATION: 97 % | HEIGHT: 67 IN | BODY MASS INDEX: 23.73 KG/M2 | SYSTOLIC BLOOD PRESSURE: 111 MMHG

## 2021-04-02 DIAGNOSIS — F02.818 LATE ONSET ALZHEIMER'S DISEASE WITH BEHAVIORAL DISTURBANCE (H): Primary | ICD-10-CM

## 2021-04-02 DIAGNOSIS — R29.6 FALLS FREQUENTLY: ICD-10-CM

## 2021-04-02 DIAGNOSIS — G30.1 LATE ONSET ALZHEIMER'S DISEASE WITH BEHAVIORAL DISTURBANCE (H): Primary | ICD-10-CM

## 2021-04-02 DIAGNOSIS — K92.0 COFFEE GROUND EMESIS: ICD-10-CM

## 2021-04-02 PROCEDURE — 99309 SBSQ NF CARE MODERATE MDM 30: CPT | Performed by: NURSE PRACTITIONER

## 2021-04-02 ASSESSMENT — MIFFLIN-ST. JEOR: SCORE: 1128.47

## 2021-04-02 NOTE — ED NOTES
PT saturated in urine and stool. Changed and cleaned up. New brief placed. Patient home in the clothes she came in.

## 2021-04-02 NOTE — ED NOTES
Spoke to Antonio at [facility]. Updated him on orders and stopping ASA. Will attempt to give patient Protonix here in house. NP and Nephew updated by MD. Will send home via [facility] transport stretcher.

## 2021-04-02 NOTE — TELEPHONE ENCOUNTER
Staff calling to report finding a bruise on right wrist area.  9cm by 5cm   No pain.  Resident was at the ED yesterday so suspect it was from possible blood draw or IV    Electronically signed by Tiffany Wilson RN, CNP

## 2021-04-02 NOTE — DISCHARGE INSTRUCTIONS
Stop aspirin and start daily Protonix.  1 dose was given in the ER.  Continue to watch for any continued bleeding, nausea, vomiting, changes in vital signs or other acute concerns.  I spoke with both Lexii Reid and César family member over the phone and the decision was made that she be best served going home.  If anything acutely changes feel free to return to the ER at any time.

## 2021-04-05 ENCOUNTER — RECORDS - HEALTHEAST (OUTPATIENT)
Dept: LAB | Facility: CLINIC | Age: 86
End: 2021-04-05

## 2021-04-06 LAB
ANION GAP SERPL CALCULATED.3IONS-SCNC: 8 MMOL/L (ref 5–18)
BASOPHILS # BLD AUTO: 0 THOU/UL (ref 0–0.2)
BASOPHILS NFR BLD AUTO: 1 % (ref 0–2)
BUN SERPL-MCNC: 14 MG/DL (ref 8–28)
CALCIUM SERPL-MCNC: 8.5 MG/DL (ref 8.5–10.5)
CHLORIDE BLD-SCNC: 108 MMOL/L (ref 98–107)
CO2 SERPL-SCNC: 23 MMOL/L (ref 22–31)
CREAT SERPL-MCNC: 0.67 MG/DL (ref 0.6–1.1)
EOSINOPHIL # BLD AUTO: 0.4 THOU/UL (ref 0–0.4)
EOSINOPHIL NFR BLD AUTO: 6 % (ref 0–6)
ERYTHROCYTE [DISTWIDTH] IN BLOOD BY AUTOMATED COUNT: 14.8 % (ref 11–14.5)
GFR SERPL CREATININE-BSD FRML MDRD: >60 ML/MIN/1.73M2
GLUCOSE BLD-MCNC: 74 MG/DL (ref 70–125)
HCT VFR BLD AUTO: 38.1 % (ref 35–47)
HGB BLD-MCNC: 11.9 G/DL (ref 12–16)
IMM GRANULOCYTES # BLD: 0 THOU/UL
IMM GRANULOCYTES NFR BLD: 1 %
LYMPHOCYTES # BLD AUTO: 2 THOU/UL (ref 0.8–4.4)
LYMPHOCYTES NFR BLD AUTO: 30 % (ref 20–40)
MCH RBC QN AUTO: 30.1 PG (ref 27–34)
MCHC RBC AUTO-ENTMCNC: 31.2 G/DL (ref 32–36)
MCV RBC AUTO: 97 FL (ref 80–100)
MONOCYTES # BLD AUTO: 0.7 THOU/UL (ref 0–0.9)
MONOCYTES NFR BLD AUTO: 10 % (ref 2–10)
NEUTROPHILS # BLD AUTO: 3.5 THOU/UL (ref 2–7.7)
NEUTROPHILS NFR BLD AUTO: 53 % (ref 50–70)
PLATELET # BLD AUTO: 241 THOU/UL (ref 140–440)
PMV BLD AUTO: 9.8 FL (ref 8.5–12.5)
POTASSIUM BLD-SCNC: 3.9 MMOL/L (ref 3.5–5)
RBC # BLD AUTO: 3.95 MILL/UL (ref 3.8–5.4)
SODIUM SERPL-SCNC: 139 MMOL/L (ref 136–145)
WBC: 6.6 THOU/UL (ref 4–11)

## 2021-04-09 ENCOUNTER — RECORDS - HEALTHEAST (OUTPATIENT)
Dept: LAB | Facility: CLINIC | Age: 86
End: 2021-04-09

## 2021-04-13 LAB — HGB BLD-MCNC: 11.3 G/DL (ref 12–16)

## 2021-04-16 ASSESSMENT — MIFFLIN-ST. JEOR: SCORE: 1115.31

## 2021-04-20 ENCOUNTER — NURSING HOME VISIT (OUTPATIENT)
Dept: GERIATRICS | Facility: CLINIC | Age: 86
End: 2021-04-20
Payer: COMMERCIAL

## 2021-04-20 VITALS
RESPIRATION RATE: 20 BRPM | HEIGHT: 67 IN | SYSTOLIC BLOOD PRESSURE: 148 MMHG | TEMPERATURE: 97.7 F | HEART RATE: 89 BPM | BODY MASS INDEX: 23.28 KG/M2 | WEIGHT: 148.3 LBS | OXYGEN SATURATION: 94 % | DIASTOLIC BLOOD PRESSURE: 77 MMHG

## 2021-04-20 DIAGNOSIS — R29.6 FALLS FREQUENTLY: ICD-10-CM

## 2021-04-20 DIAGNOSIS — G30.1 LATE ONSET ALZHEIMER'S DISEASE WITH BEHAVIORAL DISTURBANCE (H): Primary | ICD-10-CM

## 2021-04-20 DIAGNOSIS — F02.818 LATE ONSET ALZHEIMER'S DISEASE WITH BEHAVIORAL DISTURBANCE (H): Primary | ICD-10-CM

## 2021-04-20 DIAGNOSIS — D62 ANEMIA DUE TO BLOOD LOSS, ACUTE: ICD-10-CM

## 2021-04-20 PROCEDURE — 99309 SBSQ NF CARE MODERATE MDM 30: CPT | Performed by: NURSE PRACTITIONER

## 2021-04-20 NOTE — PROGRESS NOTES
[place] [facility]  [place] Medical Record Number:  [id number]  Place of Service where encounter took place:  [facility] () [[id number]]  Chief Complaint   Patient presents with     Nursing Home Acute       HPI:    Lesvia Barney  is a 89 year old (10/29/1928), who is being seen today for an episodic care visit.  HPI information obtained from: facility chart records, facility staff and patient report. Today's concern is:    ICD-10-CM    1. Late onset Alzheimer's disease with behavioral disturbance (H)  G30.1     F02.81    2. Falls frequently  R29.6    3. Anemia due to blood loss, acute  D62      Resident resting in bed today, very resistant to exam. Staff report her behaviors have been worse over the last several weeks. Difficult to perform cares and purposefully falls backwards when trying to assist to stand/walk. Bed is now in low position due to falls and behaviors.     Recently evaluated in ED after an isolated episode of coffee ground emesis. Hemoglobin grossly abnormal from resident baseline at 11. 9. KEMAL Lindsey, declined further workup including EGD. Was sent home on protonix 40 mg/day and ASA was discontinued at that time. Serial hemoglobin checks indicated no improvement. Last hemoglobin 11.3 on 4/13/2021. Unable to obtain HPI 2/2 severe cognitive impairment.     BP Readings from Last 3 Encounters:   04/23/21 (!) 148/77   04/16/21 (!) 148/77   04/02/21 111/69     Pulse Readings from Last 4 Encounters:   04/23/21 89   04/16/21 89   04/02/21 84   04/01/21 102     Wt Readings from Last 4 Encounters:   04/23/21 67.3 kg (148 lb 4.8 oz)   04/16/21 67.3 kg (148 lb 4.8 oz)   04/02/21 68.6 kg (151 lb 3.2 oz)   03/19/21 66.4 kg (146 lb 6.4 oz)       Past Medical and Surgical History reviewed in Epic today.    MEDICATIONS:    Current Outpatient Medications   Medication Sig Dispense Refill     acetaminophen (TYLENOL) 500 MG tablet Take 2 tablets (1,000 mg) by mouth daily as needed for mild pain    "    aspirin 81 MG chewable tablet Take 1 tablet (81 mg) by mouth daily 108 tablet 3     Nutritional Supplements (ENSURE PLUS PO) Take 4 oz by mouth 3 times daily       OLANZapine (ZYPREXA) 2.5 MG tablet Take 2.5 mg by mouth At Bedtime       pantoprazole (PROTONIX) 40 MG EC tablet Take 1 tablet (40 mg) by mouth daily for 30 doses 30 tablet 0         REVIEW OF SYSTEMS:  Unobtainable secondary to cognitive impairment.     Objective:  BP (!) 148/77   Pulse 89   Temp 97.7  F (36.5  C)   Resp 20   Ht 1.702 m (5' 7\")   Wt 67.3 kg (148 lb 4.8 oz)   SpO2 94%   BMI 23.23 kg/m    Exam:  GENERAL APPEARANCE:  Alert, anxious, uncooperative  ENT:  Mouth and posterior oropharynx normal, moist mucous membranes, normal hearing acuity  RESP:  respiratory effort and palpation of chest normal, lungs clear to auscultation , no respiratory distress  CV:  Palpation and auscultation of heart done , regular rate and rhythm, no murmur, rub, or gallop  M/S:   Gait and station normal  Digits and nails normal  SKIN:  Inspection of skin and subcutaneous tissue baseline, Palpation of skin and subcutaneous tissue baseline  NEURO:   Cranial nerves 2-12 are normal tested and grossly at patient's baseline  PSYCH:  insight and judgement impaired, memory impaired , affect and mood normal    Labs:   Labs done in SNF [facility] in [facility]. Please refer to them using [employer] Everywhere. and Recent labs in EPIC reviewed by me today.     ASSESSMENT/PLAN:  Late onset Alzheimer's disease with behavioral disturbance (H)  -Progressive, now with functional decline and frequent falls. Requiring more than assist of 2 for cares and ambulation. Very resistant, taking zyprexa 2.5 mg/day. Nephrodolfo Lindsey has been mindful to make medication changes and use antipsychotic mediations.   -Would benefit from LTC memory floor due to increased care needs     Falls frequently  -Less falls, likely 2/2 functional decline and less ambulating.   -Continue LTC support for safe " transfers    Anemia due to blood loss, acute  -Unknown etiology; recent isolated event of coffee ground emesis. Started on high dose PPI with little improvement seen.   -[facility] to evaluate for admission, will discuss status with POA.    Orders:  CBC next lab day   [facility] evaluation     Electronically signed by:  GI Ash CNP  [facility]

## 2021-04-20 NOTE — LETTER
4/20/2021        RE: Lesvia Barney  [facility]  [address]  [facility] [address]        [place] [facility]  [place] Medical Record Number:  [id number]  Place of Service where encounter took place:  [facility] () [[id number]]  Chief Complaint   Patient presents with     Nursing Home Acute       HPI:    Lesvia Barney  is a 89 year old (10/29/1928), who is being seen today for an episodic care visit.  HPI information obtained from: facility chart records, facility staff and patient report. Today's concern is:    ICD-10-CM    1. Late onset Alzheimer's disease with behavioral disturbance (H)  G30.1     F02.81    2. Falls frequently  R29.6    3. Anemia due to blood loss, acute  D62      Resident resting in bed today, very resistant to exam. Staff report her behaviors have been worse over the last several weeks. Difficult to perform cares and purposefully falls backwards when trying to assist to stand/walk. Bed is now in low position due to falls and behaviors.     Recently evaluated in ED after an isolated episode of coffee ground emesis. Hemoglobin grossly abnormal from resident baseline at 11. 9. KEMAL Lindsey, declined further workup including EGD. Was sent home on protonix 40 mg/day and ASA was discontinued at that time. Serial hemoglobin checks indicated no improvement. Last hemoglobin 11.3 on 4/13/2021. Unable to obtain HPI 2/2 severe cognitive impairment.     BP Readings from Last 3 Encounters:   04/23/21 (!) 148/77   04/16/21 (!) 148/77   04/02/21 111/69     Pulse Readings from Last 4 Encounters:   04/23/21 89   04/16/21 89   04/02/21 84   04/01/21 102     Wt Readings from Last 4 Encounters:   04/23/21 67.3 kg (148 lb 4.8 oz)   04/16/21 67.3 kg (148 lb 4.8 oz)   04/02/21 68.6 kg (151 lb 3.2 oz)   03/19/21 66.4 kg (146 lb 6.4 oz)       Past Medical and Surgical History reviewed in Epic today.    MEDICATIONS:    Current Outpatient Medications   Medication Sig  "Dispense Refill     acetaminophen (TYLENOL) 500 MG tablet Take 2 tablets (1,000 mg) by mouth daily as needed for mild pain       aspirin 81 MG chewable tablet Take 1 tablet (81 mg) by mouth daily 108 tablet 3     Nutritional Supplements (ENSURE PLUS PO) Take 4 oz by mouth 3 times daily       OLANZapine (ZYPREXA) 2.5 MG tablet Take 2.5 mg by mouth At Bedtime       pantoprazole (PROTONIX) 40 MG EC tablet Take 1 tablet (40 mg) by mouth daily for 30 doses 30 tablet 0         REVIEW OF SYSTEMS:  Unobtainable secondary to cognitive impairment.     Objective:  BP (!) 148/77   Pulse 89   Temp 97.7  F (36.5  C)   Resp 20   Ht 1.702 m (5' 7\")   Wt 67.3 kg (148 lb 4.8 oz)   SpO2 94%   BMI 23.23 kg/m    Exam:  GENERAL APPEARANCE:  Alert, anxious, uncooperative  ENT:  Mouth and posterior oropharynx normal, moist mucous membranes, normal hearing acuity  RESP:  respiratory effort and palpation of chest normal, lungs clear to auscultation , no respiratory distress  CV:  Palpation and auscultation of heart done , regular rate and rhythm, no murmur, rub, or gallop  M/S:   Gait and station normal  Digits and nails normal  SKIN:  Inspection of skin and subcutaneous tissue baseline, Palpation of skin and subcutaneous tissue baseline  NEURO:   Cranial nerves 2-12 are normal tested and grossly at patient's baseline  PSYCH:  insight and judgement impaired, memory impaired , affect and mood normal    Labs:   Labs done in SNF are in [facility]. Please refer to them using [employer]Care Everywhere. and Recent labs in [facility] reviewed by me today.     ASSESSMENT/PLAN:  Late onset Alzheimer's disease with behavioral disturbance (H)  -Progressive, now with functional decline and frequent falls. Requiring more than assist of 2 for cares and ambulation. Very resistant, taking zyprexa 2.5 mg/day. Nephew César has been mindful to make medication changes and use antipsychotic mediations.   -Would benefit from LTC memory floor due to increased care " needs     Falls frequently  -Less falls, likely 2/2 functional decline and less ambulating.   -Continue LTC support for safe transfers    Anemia due to blood loss, acute  -Unknown etiology; recent isolated event of coffee ground emesis. Started on high dose PPI with little improvement seen.   -[facility] to evaluate for admission, will discuss status with POA.    Orders:  CBC next lab day   [facility] evaluation     Electronically signed by:  IG Ash [facility]           Sincerely,        Lexii Reid NP

## 2021-04-23 ENCOUNTER — NURSING HOME VISIT (OUTPATIENT)
Dept: GERIATRICS | Facility: CLINIC | Age: 86
End: 2021-04-23
Payer: COMMERCIAL

## 2021-04-23 VITALS
OXYGEN SATURATION: 94 % | HEART RATE: 89 BPM | WEIGHT: 148.3 LBS | HEIGHT: 67 IN | RESPIRATION RATE: 20 BRPM | BODY MASS INDEX: 23.28 KG/M2 | TEMPERATURE: 98.1 F | DIASTOLIC BLOOD PRESSURE: 77 MMHG | SYSTOLIC BLOOD PRESSURE: 148 MMHG

## 2021-04-23 DIAGNOSIS — K92.0 COFFEE GROUND EMESIS: Primary | ICD-10-CM

## 2021-04-23 DIAGNOSIS — F02.818 LATE ONSET ALZHEIMER'S DISEASE WITH BEHAVIORAL DISTURBANCE (H): ICD-10-CM

## 2021-04-23 DIAGNOSIS — G30.1 LATE ONSET ALZHEIMER'S DISEASE WITH BEHAVIORAL DISTURBANCE (H): ICD-10-CM

## 2021-04-23 DIAGNOSIS — F69 ADULT BEHAVIOR PROBLEM: ICD-10-CM

## 2021-04-23 DIAGNOSIS — D45 PRIMARY POLYCYTHEMIA (H): ICD-10-CM

## 2021-04-23 PROCEDURE — 99309 SBSQ NF CARE MODERATE MDM 30: CPT | Performed by: INTERNAL MEDICINE

## 2021-04-23 ASSESSMENT — MIFFLIN-ST. JEOR: SCORE: 1115.31

## 2021-04-23 NOTE — LETTER
"    4/23/2021        RE: Lesvia Barney  [facility]  [address]  [facility] [address]        Lesvia Barney is a 89 year old female seen April 23, 2021 at [facility] unit where she has resided for 5 years (admit 8/2015) seen to follow up worsening dementia.   Pt is seen in her room sitting edge of bed.  States \"I have nothing of interest to report to you.\"    She denies pain, dyspnea or any GI symptoms.     Pt had ED visit earlier this month for coffee ground emesis.   Isolated incident and has not recurred.   She was given a 30 day course of pantoprazole.     Pt has many years of gradually progressive Alzheimer's dementia with general decline functionally.    No longer as well put together as she used to be, refuses bathing or showers at times.  She remains ambulatory with a 4WW but with significant deformity of LE joints, flexed at knees and hips, forward stoop.   Has become a difficult transfer, needs 2-3 person assist for cares.  She recently has developed paranoia and agitation, purposefully falling backwards when staff trying to assist with mobility.   She has been started on olanzapine for this, not clear how much it is helping.        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    Her POA is nephrodolfo Barney [phone]    ROS:  +incontinence; BIMS 8/15  Weight is stable     EXAM:  NAD, alert and oriented x1  BP (!) 148/77   Pulse 89   Temp 98.1  F (36.7  C)   Resp 20   Ht 1.702 m (5' 7\")   Wt 67.3 kg (148 lb 4.8 oz)   SpO2 94%   BMI 23.23 kg/m     Neck supple without adenopathy  Lungs with decreased BS, no rales or " wheeze  Heart RRR s1s2, without murmur  Abd soft, NT, no distention, +BS, no masses, no rebound or guarding.     Ext trace ankle edema L>R, valgus deformity of left knee, and chronic hip flexion  Neuro: no tremor or stiffness, +wordfinding difficulty.  Psych: affect a bit irritable    Last Comprehensive Metabolic Panel:  Sodium   Date Value Ref Range Status   04/01/2021 140 133 - 144 mmol/L Final     Potassium   Date Value Ref Range Status   04/01/2021 4.9 3.4 - 5.3 mmol/L Final     Chloride   Date Value Ref Range Status   04/01/2021 109 94 - 109 mmol/L Final     Carbon Dioxide   Date Value Ref Range Status   04/01/2021 27 20 - 32 mmol/L Final     Anion Gap   Date Value Ref Range Status   04/01/2021 4 3 - 14 mmol/L Final     Glucose   Date Value Ref Range Status   04/01/2021 114 (H) 70 - 99 mg/dL Final     Urea Nitrogen   Date Value Ref Range Status   04/01/2021 28 7 - 30 mg/dL Final     Creatinine   Date Value Ref Range Status   04/01/2021 0.99 0.52 - 1.04 mg/dL Final     GFR Estimate   Date Value Ref Range Status   04/01/2021 49 (L) >60 mL/min/[1.73_m2] Final     Comment:     Non  GFR Calc  Starting 12/18/2018, serum creatinine based estimated GFR (eGFR) will be   calculated using the Chronic Kidney Disease Epidemiology Collaboration   (CKD-EPI) equation.       Calcium   Date Value Ref Range Status   04/01/2021 9.0 8.5 - 10.1 mg/dL Final     Lab Results   Component Value Date    AST 11 04/01/2021      ALBUMIN 3.1 04/01/2021     Lab Results   Component Value Date    PROTTOTAL 6.6 04/01/2021      Lab Results   Component Value Date    ALKPHOS 99 04/01/2021     Lab Results   Component Value Date    WBC 13.0 04/01/2021      HGB 14.8 04/01/2021      MCV 94 04/01/2021       04/01/2021       IMP/PLAN:  (K92.0) Coffee ground emesis    Comment: no recurrence   Plan: finish 30 day course of scheduled pantoprazole, then change to prn.     (D45) Primary polycythemia (H)  Comment: numbers improved, no h/o  lung disease or hypoxia  Plan: She is not a candidate for a workup; continue daily aspirin 81 mg.          (M15.0) Primary osteoarthritis involving multiple joints   Comment: significant deforming changes of OA in LE joints, but no reports of pain.  Plan: Assist with transfers.   Ambulation with walker and directional cuing  Local measures and prn acetaminophen should she c/o pain.         (G30.1,  F02.80) Late onset Alzheimer's disease without behavioral disturbance  Comment:  gradual decline over past few years, low functional status   Plan: Board and Care Memory Care unit for assist with meals, meds, cares, activity and secure unit.          (F69) Adult behavior problem  Comment: paranoia, agitation, aggressive resistance to cares   Plan: olanzapine 2.5 mg/HS      Advanced directives: DNR/DNI.        Nabila Velasquez MD           Sincerely,        Nabila Velasquez MD

## 2021-04-29 ENCOUNTER — NURSING HOME VISIT (OUTPATIENT)
Dept: GERIATRICS | Facility: CLINIC | Age: 86
End: 2021-04-29
Payer: COMMERCIAL

## 2021-04-29 VITALS
BODY MASS INDEX: 23.28 KG/M2 | HEIGHT: 67 IN | SYSTOLIC BLOOD PRESSURE: 149 MMHG | HEART RATE: 89 BPM | RESPIRATION RATE: 16 BRPM | OXYGEN SATURATION: 93 % | WEIGHT: 148.3 LBS | DIASTOLIC BLOOD PRESSURE: 77 MMHG | TEMPERATURE: 98.3 F

## 2021-04-29 DIAGNOSIS — D62 ANEMIA DUE TO BLOOD LOSS, ACUTE: ICD-10-CM

## 2021-04-29 DIAGNOSIS — F02.818 LATE ONSET ALZHEIMER'S DISEASE WITH BEHAVIORAL DISTURBANCE (H): Primary | ICD-10-CM

## 2021-04-29 DIAGNOSIS — G30.1 LATE ONSET ALZHEIMER'S DISEASE WITH BEHAVIORAL DISTURBANCE (H): Primary | ICD-10-CM

## 2021-04-29 DIAGNOSIS — R29.6 FALLS FREQUENTLY: ICD-10-CM

## 2021-04-29 PROCEDURE — 99316 NF DSCHRG MGMT 30 MIN+: CPT | Performed by: NURSE PRACTITIONER

## 2021-04-29 ASSESSMENT — MIFFLIN-ST. JEOR: SCORE: 1115.31

## 2021-04-29 NOTE — PROGRESS NOTES
[facility] DISCHARGE SUMMARY    PATIENT'S NAME: Lesvia Barney  YOB: 1928  MEDICAL RECORD NUMBER:  [id number]  Place of Service where encounter took place:  [facility] (NF) [[id number]]    PRIMARY CARE PROVIDER AND CLINIC RESPONSIBLE AFTER TRANSFER: Lexii Reid [address] / [place] [address]     TRANSFERRING PROVIDERS: Lexii Reid NP, Nabila Velasquez MD    DATE OF SNF (anticipated) DISCHARGE: May / 02 / 2021  DISCHARGE DISPOSITION: [facility]     CODE STATUS/ADVANCE DIRECTIVES DISCUSSION:   DNR / DNI     Allergies   Allergen Reactions     No Known Allergies      Condition on Discharge:  Stable.  Function:  Up with assist of 2, recent functional decline   Cognitive Scores: SLUMS unable to perform     Equipment: walker    DISCHARGE DIAGNOSIS:   1. Late onset Alzheimer's disease with behavioral disturbance (H)    2. Falls frequently    3. Anemia due to blood loss, acute        HPI Nursing Facility Course:  HPI information obtained from: facility chart records, facility staff and patient report.      ICD-10-CM    1. Late onset Alzheimer's disease with behavioral disturbance (H)  G30.1     F02.81    2. Falls frequently  R29.6    3. Anemia due to blood loss, acute  D62    -Resident with recent functional decline and progression of dementia. Has had several falls likely related to behaviors. Now requiring assist of 2. Will discharge to [facility] in next few days.   -Had isolated coffee ground emesis and evaluated in ED. Family decided against extensive workup. Was discharged off ASA and started on PPI. No improvement seen in hemoglobin.     Wt Readings from Last 4 Encounters:   04/30/21 65.9 kg (145 lb 4.8 oz)   04/29/21 67.3 kg (148 lb 4.8 oz)   04/23/21 67.3 kg (148 lb 4.8 oz)   04/16/21 67.3 kg (148 lb 4.8 oz)     Pulse Readings from Last 4 Encounters:   04/30/21 76   04/29/21 89   04/23/21 89   04/16/21 89     BP  "Readings from Last 3 Encounters:   04/30/21 (!) 143/78   04/29/21 (!) 149/77   04/23/21 (!) 148/77         PAST MEDICAL HISTORY:  has a past medical history of HTN (hypertension). She also has no past medical history of Chronic infection, Malignant hyperthermia, or Sleep apnea.    DISCHARGE MEDICATIONS:  Current Outpatient Medications   Medication Sig Dispense Refill     acetaminophen (TYLENOL) 500 MG tablet Take 2 tablets (1,000 mg) by mouth daily as needed for mild pain       aspirin 81 MG chewable tablet Take 1 tablet (81 mg) by mouth daily 108 tablet 3     Nutritional Supplements (ENSURE PLUS PO) Take 4 oz by mouth 3 times daily       OLANZapine (ZYPREXA) 2.5 MG tablet Take 2.5 mg by mouth At Bedtime         MEDICATION CHANGES/RATIONALE:   No changes   Controlled medications sent with patient:   not applicable/none     ROS:    Unobtainable secondary to cognitive impairment.     Physical Exam:   Vitals: BP (!) 149/77   Pulse 89   Temp 98.3  F (36.8  C)   Resp 16   Ht 1.702 m (5' 7\")   Wt 67.3 kg (148 lb 4.8 oz)   SpO2 93%   BMI 23.23 kg/m    BMI= Body mass index is 23.23 kg/m .    GENERAL APPEARANCE:  Alert  ENT:  Mouth and posterior oropharynx normal, moist mucous membranes, [place]  RESP:  respiratory effort and palpation of chest normal, lungs clear to auscultation   CV:  Palpation and auscultation of heart done , regular rate and rhythm, no murmur, rub, or gallop  M/S:   Gait and station normal  Digits and nails normal  SKIN:  Inspection of skin and subcutaneous tissue baseline, Palpation of skin and subcutaneous tissue baseline  NEURO:   Cranial nerves 2-12 are normal tested and grossly at patient's baseline  PSYCH:  insight and judgement impaired, memory impaired , affect and mood normal    DISCHARGE PLAN:    Patient instructed to follow-up with:  PCP in 14 days        [facility] scheduled appointments:  No future appointments.    MTM referral needed and placed by this provider: " No    Pending labs: None  SNF labs none  Discharge Treatments:none     TOTAL DISCHARGE TIME:   Greater than 30 minutes  Electronically signed by:  Lexii Reid NP

## 2021-04-29 NOTE — LETTER
4/29/2021        RE: Lesvia Barney  [facility]  [address]  [facility] [address]          [facility] DISCHARGE SUMMARY    PATIENT'S NAME: Lesvia Barney  YOB: 1928  MEDICAL RECORD NUMBER:  [id number]  Place of Service where encounter took place:  [facility] (NF) [[id number]]    PRIMARY CARE PROVIDER AND CLINIC RESPONSIBLE AFTER TRANSFER: Lexii Reid [address] / [place] [address]     TRANSFERRING PROVIDERS: Lexii Reid NP, Nabila Velasquez MD    DATE OF SNF (anticipated) DISCHARGE: May / 02 / 2021  DISCHARGE DISPOSITION: [facility]     CODE STATUS/ADVANCE DIRECTIVES DISCUSSION:   DNR / DNI     Allergies   Allergen Reactions     No Known Allergies      Condition on Discharge:  Stable.  Function:  Up with assist of 2, recent functional decline   Cognitive Scores: SLUMS unable to perform     Equipment: walker    DISCHARGE DIAGNOSIS:   1. Late onset Alzheimer's disease with behavioral disturbance (H)    2. Falls frequently    3. Anemia due to blood loss, acute        [facility] Nursing Facility Course:  HPI information obtained from: facility chart records, facility staff and patient report.      ICD-10-CM    1. Late onset Alzheimer's disease with behavioral disturbance (H)  G30.1     F02.81    2. Falls frequently  R29.6    3. Anemia due to blood loss, acute  D62    -Resident with recent functional decline and progression of dementia. Has had several falls likely related to behaviors. Now requiring assist of 2. Will discharge to [facility] in next few days.   -Had isolated coffee ground emesis and evaluated in ED. Family decided against extensive workup. Was discharged off ASA and started on PPI. No improvement seen in hemoglobin.     Wt Readings from Last 4 Encounters:   04/30/21 65.9 kg (145 lb 4.8 oz)   04/29/21 67.3 kg (148 lb 4.8 oz)   04/23/21 67.3 kg (148 lb 4.8 oz)   04/16/21 67.3 kg (148  "lb 4.8 oz)     Pulse Readings from Last 4 Encounters:   04/30/21 76   04/29/21 89   04/23/21 89   04/16/21 89     BP Readings from Last 3 Encounters:   04/30/21 (!) 143/78   04/29/21 (!) 149/77   04/23/21 (!) 148/77         PAST MEDICAL HISTORY:  has a past medical history of HTN (hypertension). She also has no past medical history of Chronic infection, Malignant hyperthermia, or Sleep apnea.    DISCHARGE MEDICATIONS:  Current Outpatient Medications   Medication Sig Dispense Refill     acetaminophen (TYLENOL) 500 MG tablet Take 2 tablets (1,000 mg) by mouth daily as needed for mild pain       aspirin 81 MG chewable tablet Take 1 tablet (81 mg) by mouth daily 108 tablet 3     Nutritional Supplements (ENSURE PLUS PO) Take 4 oz by mouth 3 times daily       OLANZapine (ZYPREXA) 2.5 MG tablet Take 2.5 mg by mouth At Bedtime         MEDICATION CHANGES/RATIONALE:   No changes   Controlled medications sent with patient:   not applicable/none     ROS:    Unobtainable secondary to cognitive impairment.     Physical Exam:   Vitals: BP (!) 149/77   Pulse 89   Temp 98.3  F (36.8  C)   Resp 16   Ht 1.702 m (5' 7\")   Wt 67.3 kg (148 lb 4.8 oz)   SpO2 93%   BMI 23.23 kg/m    BMI= Body mass index is 23.23 kg/m .    GENERAL APPEARANCE:  Alert  ENT:  Mouth and posterior oropharynx normal, moist mucous membranes, [place]  RESP:  respiratory effort and palpation of chest normal, lungs clear to auscultation   CV:  Palpation and auscultation of heart done , regular rate and rhythm, no murmur, rub, or gallop  M/S:   Gait and station normal  Digits and nails normal  SKIN:  Inspection of skin and subcutaneous tissue baseline, Palpation of skin and subcutaneous tissue baseline  NEURO:   Cranial nerves 2-12 are normal tested and grossly at patient's baseline  PSYCH:  insight and judgement impaired, memory impaired , affect and mood normal    DISCHARGE PLAN:    Patient instructed to follow-up with:  PCP in 14 days        [facility] " Александр scheduled appointments:  No future appointments.    MTM referral needed and placed by this provider: No    Pending labs: None  SNF labs none  Discharge Treatments:none     TOTAL DISCHARGE TIME:   Greater than 30 minutes  Electronically signed by:  Lexii Reid NP        Sincerely,        Lexii Reid NP

## 2021-04-30 ASSESSMENT — MIFFLIN-ST. JEOR: SCORE: 1101.71

## 2021-04-30 NOTE — PROGRESS NOTES
"Lesvia Barney is a 89 year old female seen April 23, 2021 at [facility] unit where she has resided for 5 years (admit 8/2015) seen to follow up worsening dementia.   Pt is seen in her room sitting edge of bed.  States \"I have nothing of interest to report to you.\"    She denies pain, dyspnea or any GI symptoms.     Pt had ED visit earlier this month for coffee ground emesis.   Isolated incident and has not recurred.   She was given a 30 day course of pantoprazole.     Pt has many years of gradually progressive Alzheimer's dementia with general decline functionally.    No longer as well put together as she used to be, refuses bathing or showers at times.  She remains ambulatory with a 4WW but with significant deformity of LE joints, flexed at knees and hips, forward stoop.   Has become a difficult transfer, needs 2-3 person assist for cares.  She recently has developed paranoia and agitation, purposefully falling backwards when staff trying to assist with mobility.   She has been started on olanzapine for this, not clear how much it is helping.        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    Her POA is nephrodolfo Barney [phone]    ROS:  +incontinence; BIMS 8/15  Weight is stable     EXAM:  NAD, alert and oriented x1  BP (!) 148/77   Pulse 89   Temp 98.1  F (36.7  C)   Resp 20   Ht 1.702 m (5' 7\")   Wt 67.3 kg (148 lb 4.8 oz)   SpO2 94%   BMI 23.23 kg/m     Neck supple without adenopathy  Lungs with decreased BS, no rales or wheeze  Heart RRR s1s2, without murmur  Abd soft, NT, no distention, +BS, no masses, no rebound or guarding.     Ext trace " ankle edema L>R, valgus deformity of left knee, and chronic hip flexion  Neuro: no tremor or stiffness, +wordfinding difficulty.  Psych: affect a bit irritable    Last Comprehensive Metabolic Panel:  Sodium   Date Value Ref Range Status   04/01/2021 140 133 - 144 mmol/L Final     Potassium   Date Value Ref Range Status   04/01/2021 4.9 3.4 - 5.3 mmol/L Final     Chloride   Date Value Ref Range Status   04/01/2021 109 94 - 109 mmol/L Final     Carbon Dioxide   Date Value Ref Range Status   04/01/2021 27 20 - 32 mmol/L Final     Anion Gap   Date Value Ref Range Status   04/01/2021 4 3 - 14 mmol/L Final     Glucose   Date Value Ref Range Status   04/01/2021 114 (H) 70 - 99 mg/dL Final     Urea Nitrogen   Date Value Ref Range Status   04/01/2021 28 7 - 30 mg/dL Final     Creatinine   Date Value Ref Range Status   04/01/2021 0.99 0.52 - 1.04 mg/dL Final     GFR Estimate   Date Value Ref Range Status   04/01/2021 49 (L) >60 mL/min/[1.73_m2] Final     Comment:     Non  GFR Calc  Starting 12/18/2018, serum creatinine based estimated GFR (eGFR) will be   calculated using the Chronic Kidney Disease Epidemiology Collaboration   (CKD-EPI) equation.       Calcium   Date Value Ref Range Status   04/01/2021 9.0 8.5 - 10.1 mg/dL Final     Lab Results   Component Value Date    AST 11 04/01/2021      ALBUMIN 3.1 04/01/2021     Lab Results   Component Value Date    PROTTOTAL 6.6 04/01/2021      Lab Results   Component Value Date    ALKPHOS 99 04/01/2021     Lab Results   Component Value Date    WBC 13.0 04/01/2021      HGB 14.8 04/01/2021      MCV 94 04/01/2021       04/01/2021       IMP/PLAN:  (K92.0) Coffee ground emesis    Comment: no recurrence   Plan: finish 30 day course of scheduled pantoprazole, then change to prn.     (D45) Primary polycythemia (H)  Comment: numbers improved, no h/o lung disease or hypoxia  Plan: She is not a candidate for a workup; continue daily aspirin 81 mg.          (M15.0) Primary  osteoarthritis involving multiple joints   Comment: significant deforming changes of OA in LE joints, but no reports of pain.  Plan: Assist with transfers.   Ambulation with walker and directional cuing  Local measures and prn acetaminophen should she c/o pain.         (G30.1,  F02.80) Late onset Alzheimer's disease without behavioral disturbance  Comment:  gradual decline over past few years, low functional status   Plan: [facility] unit for assist with meals, meds, cares, activity and secure unit.          (F69) Adult behavior problem  Comment: paranoia, agitation, aggressive resistance to cares   Plan: olanzapine 2.5 mg/HS      Advanced directives: DNR/DNI.        Nabila Velasquez MD

## 2021-05-04 ENCOUNTER — NURSING HOME VISIT (OUTPATIENT)
Dept: GERIATRICS | Facility: CLINIC | Age: 86
End: 2021-05-04
Payer: COMMERCIAL

## 2021-05-04 VITALS
HEART RATE: 76 BPM | DIASTOLIC BLOOD PRESSURE: 78 MMHG | HEIGHT: 67 IN | OXYGEN SATURATION: 95 % | BODY MASS INDEX: 22.81 KG/M2 | TEMPERATURE: 97.9 F | RESPIRATION RATE: 18 BRPM | SYSTOLIC BLOOD PRESSURE: 143 MMHG | WEIGHT: 145.3 LBS

## 2021-05-04 DIAGNOSIS — Z53.9 ERRONEOUS ENCOUNTER--DISREGARD: Primary | ICD-10-CM

## 2021-06-11 ASSESSMENT — MIFFLIN-ST. JEOR: SCORE: 1157.5

## 2021-06-15 ENCOUNTER — VIRTUAL VISIT (OUTPATIENT)
Dept: GERIATRICS | Facility: CLINIC | Age: 86
End: 2021-06-15
Payer: COMMERCIAL

## 2021-06-15 VITALS
DIASTOLIC BLOOD PRESSURE: 79 MMHG | WEIGHT: 150.6 LBS | OXYGEN SATURATION: 97 % | BODY MASS INDEX: 22.31 KG/M2 | SYSTOLIC BLOOD PRESSURE: 139 MMHG | HEIGHT: 69 IN | RESPIRATION RATE: 18 BRPM | TEMPERATURE: 97.9 F | HEART RATE: 76 BPM

## 2021-06-15 DIAGNOSIS — D45 PRIMARY POLYCYTHEMIA (H): ICD-10-CM

## 2021-06-15 DIAGNOSIS — F41.9 ANXIETY: ICD-10-CM

## 2021-06-15 DIAGNOSIS — G30.1 LATE ONSET ALZHEIMER'S DISEASE WITH BEHAVIORAL DISTURBANCE (H): Primary | ICD-10-CM

## 2021-06-15 DIAGNOSIS — F02.818 LATE ONSET ALZHEIMER'S DISEASE WITH BEHAVIORAL DISTURBANCE (H): Primary | ICD-10-CM

## 2021-06-15 PROCEDURE — 99441 PR PHYSICIAN TELEPHONE EVALUATION 5-10 MIN: CPT | Mod: 95 | Performed by: NURSE PRACTITIONER

## 2021-06-15 NOTE — PROGRESS NOTES
"Telephone visit  [facility]  Lesvia Barney is a 89 year old year old adult who is being evaluated via a billable telephone visit due to the restrictions of the Covid-19 pandemic. The patient has been notified of following: \"This telephone visit will be conducted via a call between you and your provider. We have found that certain health care needs can be provided without the need for a physical exam. This service lets us provide the care you need with a short phone conversation. If a prescription is necessary we will work with the facility you are at and can send it directly to your pharmacy. If lab work is needed we can place an order to have it drawn at the facility you are at. If during the course of the call the provider feels a telephone visit is not appropriate, you will not be charged for this service.\"   Patient or Patient's first contact has given verbal consent for Telephone visit?  No  Place of Location at the time of visit:  [facility]    Lesvia Barney complains of :   Chief Complaint   Patient presents with     P Care Coordination - Telephone Follow-up     Nursing Home Regulatory     I have reviewed and updated the patient's Past Medical History, Social History, Family History and Medication List.  ALLERGIES:   Allergies   Allergen Reactions     No Known Allergies       HPI: HPI information obtained from: facility chart records, facility staff and patient report.      Talked with nursing facility staff, no concerns noted. Behaviors have improved since resident to [facility] memory care. No recent concerns with bleeding or bloody emesis. No bladder or bowel concerns, eating and sleeping ok.     Wt Readings from Last 4 Encounters:   06/11/21 68.3 kg (150 lb 9.6 oz)   04/30/21 65.9 kg (145 lb 4.8 oz)   04/29/21 67.3 kg (148 lb 4.8 oz)   04/23/21 67.3 kg (148 lb 4.8 oz)     BP Readings from Last 3 Encounters:   06/11/21 139/79   04/30/21 (!) 143/78   04/29/21 (!) 149/77     Pulse " "Readings from Last 4 Encounters:   06/11/21 76   04/30/21 76   04/29/21 89   04/23/21 89         MEDICATIONS:    Current Outpatient Medications   Medication Sig Dispense Refill     acetaminophen (TYLENOL) 500 MG tablet Take 2 tablets (1,000 mg) by mouth daily as needed for mild pain       aspirin 81 MG chewable tablet Take 1 tablet (81 mg) by mouth daily 108 tablet 3     Nutritional Supplements (ENSURE PLUS PO) Take 4 oz by mouth 3 times daily       OLANZapine (ZYPREXA) 2.5 MG tablet Take 2.5 mg by mouth At Bedtime       REVIEW OF SYSTEMS: Unobtainable secondary to cognitive impairment.   Objective: /79   Pulse 76   Temp 97.9  F (36.6  C)   Resp 18   Ht 1.753 m (5' 9\")   Wt 68.3 kg (150 lb 9.6 oz)   SpO2 97%   BMI 22.24 kg/m    Limited visit exam done given COVID-19 precautions.     Labs:   Labs done in SNF are in [facility]. Please refer to them using [facility]/Care Everywhere. and Recent labs in [facility] reviewed by me today.   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 Continue support with medication administration, meals and safety. Expect further decline with progression of disease.     Primary polycythemia (H)  Blood work stable.     Anxiety  Improved, will continue zyprexa 2.5 mg/day. No changes at this time due to stability.     Phone call duration:  7 minutes  GI Ash [facility]           "

## 2021-06-15 NOTE — LETTER
"    6/15/2021        RE: Lesvia Christie [facility]  [address]  [facility] [address]        Telephone visit  [facility]  Lesvia Barney is a 89 year old year old adult who is being evaluated via a billable telephone visit due to the restrictions of the Covid-19 pandemic. The patient has been notified of following: \"This telephone visit will be conducted via a call between you and your provider. We have found that certain health care needs can be provided without the need for a physical exam. This service lets us provide the care you need with a short phone conversation. If a prescription is necessary we will work with the facility you are at and can send it directly to your pharmacy. If lab work is needed we can place an order to have it drawn at the facility you are at. If during the course of the call the provider feels a telephone visit is not appropriate, you will not be charged for this service.\"   Patient or Patient's first contact has given verbal consent for Telephone visit?  No  Place of Location at the time of visit:  [facility]    Lesvia Barney complains of :   Chief Complaint   Patient presents with     FVP Care Coordination - Telephone Follow-up     Nursing Home Regulatory     I have reviewed and updated the patient's Past Medical History, Social History, Family History and Medication List.  ALLERGIES:   Allergies   Allergen Reactions     No Known Allergies       HPI: HPI information obtained from: facility chart records, facility staff and patient report.      Talked with nursing facility staff, no concerns noted. Behaviors have improved since resident to [facility] memory care. No recent concerns with bleeding or bloody emesis. No bladder or bowel concerns, eating and sleeping ok.     Wt Readings from Last 4 Encounters:   06/11/21 68.3 kg (150 lb 9.6 oz)   04/30/21 65.9 kg (145 lb 4.8 oz)   04/29/21 67.3 kg (148 lb 4.8 oz)   04/23/21 67.3 kg (148 lb 4.8 oz) " "    BP Readings from Last 3 Encounters:   06/11/21 139/79   04/30/21 (!) 143/78   04/29/21 (!) 149/77     Pulse Readings from Last 4 Encounters:   06/11/21 76   04/30/21 76   04/29/21 89   04/23/21 89         MEDICATIONS:    Current Outpatient Medications   Medication Sig Dispense Refill     acetaminophen (TYLENOL) 500 MG tablet Take 2 tablets (1,000 mg) by mouth daily as needed for mild pain       aspirin 81 MG chewable tablet Take 1 tablet (81 mg) by mouth daily 108 tablet 3     Nutritional Supplements (ENSURE PLUS PO) Take 4 oz by mouth 3 times daily       OLANZapine (ZYPREXA) 2.5 MG tablet Take 2.5 mg by mouth At Bedtime       REVIEW OF SYSTEMS: Unobtainable secondary to cognitive impairment.   Objective: /79   Pulse 76   Temp 97.9  F (36.6  C)   Resp 18   Ht 1.753 m (5' 9\")   Wt 68.3 kg (150 lb 9.6 oz)   SpO2 97%   BMI 22.24 kg/m    Limited visit exam done given COVID-19 precautions.     Labs:   Labs done in SNF are in [facility]. Please refer to them using [facility]/Care Everywhere. and Recent labs in [facility] reviewed by me today.   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 Continue support with medication administration, meals and safety. Expect further decline with progression of disease.     Primary polycythemia (H)  Blood work stable.     Anxiety  Improved, will continue zyprexa 2.5 mg/day. No changes at this time due to stability.     Phone call duration:  7 minutes  GI Ash [facility]                 Sincerely,        Lexii Reid NP    "

## 2021-08-11 NOTE — LETTER
"    8/11/2021        RE: Lesvia Barney  [facility]  [address]  [facility] [address]        Lesvia Barney is a 89 year old female seen August 11, 2021 at [facility] where she has resided for 6 years (admit to Board and Care 8/2015) seen to follow up worsening dementia.   Pt is seen on the unit up to , pedaling about.   Does not give any meaningful history    Nursing staff reports she goes room to room in her WC, is intrusive and difficult to redirect.  She has been started on olanzapine for paranoia and delusional thinking, may be helping some.     Pt has many years of gradually progressive Alzheimer's dementia with significant functional decline.    No longer as well put together as she used to be, ueually refuses bathing or showers.   Has become a difficult transfer, needs 2-3 person assist for cares, and no longer ambulatory.  Has moved over to LTC because of higher care needs    She had a COVID19 infection in June 2020, recovered without hospitalization.      Pt had an April 2021 ED visit for coffee ground emesis.   Isolated incident and has not recurred.   She was given a 30 day course of pantoprazole.  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    Her POA is tony Barney [phone]    ROS:  +incontinence; BIMS 8/15  08/11/21 67 kg (147 lb 9.6 oz)   06/11/21 68.3 kg (150 lb 9.6 oz)   04/30/21 65.9 kg (145 lb 4.8 oz)   04/29/21 67.3 kg (148 lb 4.8 oz)      EXAM:  NAD, alert and oriented x1  BP (!) 143/78   Pulse 78   Temp 97.3  F (36.3  C)   Resp 18   Ht 1.753 m (5' 9\")   Wt 67 kg (147 lb 9.6 oz)   SpO2 96%   " BMI 21.80 kg/m     Neck supple without adenopathy  Lungs with decreased BS, no rales or wheeze  Heart RRR s1s2, without murmur  Abd soft, NT, no distention, +BS, no masses, no rebound or guarding.     Ext trace ankle edema L>R, valgus deformity of left knee, and chronic hip flexion  Neuro: no tremor or stiffness, +wordfinding difficulty, now WC bound  Psych: affect a bit irritable    Labs reviewed      IMP/PLAN:  (D45) Primary polycythemia (H)  Comment: numbers improved, no h/o lung disease or hypoxia  Plan: She is not a candidate for a workup, follow CBC prn         (M15.0) Primary osteoarthritis involving multiple joints   Comment: significant deforming changes of OA in LE joints, but no reports of pain.  Plan: Assist with transfers.   Ambulation with walker and directional cuing  Local measures and prn acetaminophen should she have evidence of pain           (G30.1,  F02.80) Late onset Alzheimer's disease without behavioral disturbance  Comment: gradual decline over past few years, low functional status   Plan: LTC for assist with mobility, meals, meds, cares, activity    (F69) Adult behavior problem  Comment: paranoia, agitation, aggressive resistance to cares   Plan: olanzapine 2.5 mg/HS      (K92.0) Coffee ground emesis    Comment: no recurrence   Plan: pantoprazole (PROTONIX) 40 MG EC tablet        Would decrease dose to 20 mg/day next visit.       Advanced directives: DNR/DNI.        Nabila Velasquez MD           Sincerely,        Nabila Velasquez MD

## 2021-08-18 NOTE — LETTER
"    8/18/2021        RE: Lesvia Christie [facility]  [address]  [place] [address]        Telephone visit  [facility]  Lesvia Barney is a 89 year old year old adult who is being evaluated via a billable telephone visit due to the restrictions of the Covid-19 pandemic. The patient has been notified of following: \"This telephone visit will be conducted via a call between you and your provider. We have found that certain health care needs can be provided without the need for a physical exam. This service lets us provide the care you need with a short phone conversation. If a prescription is necessary we will work with the facility you are at and can send it directly to your pharmacy. If lab work is needed we can place an order to have it drawn at the facility you are at. If during the course of the call the provider feels a telephone visit is not appropriate, you will not be charged for this service.\"   Patient or Patient's first contact has given verbal consent for Telephone visit?  No  Place of Location at the time of visit: [facility]     Lesvia Barney complains of :   Chief Complaint   Patient presents with     FVP Care Coordination - Telephone Follow-up     FVP Care Coordination - Home Visit-Annual Assessment     Annual Comprehensive Nursing Home     I have reviewed and updated the patient's Past Medical History, Social History, Family History and Medication List.  ALLERGIES:   Allergies   Allergen Reactions     No Known Allergies       HPI: HPI information obtained from: facility chart records, facility staff and patient report.    Resident resting in room, pleasant with writer. No complaints of behavioral concerns per staff. No longer ambulatory but able to propel around unit. History of hematemesis, hemoglobin stable.     Wt Readings from Last 4 Encounters:   08/18/21 67.1 kg (147 lb 14.4 oz)   08/11/21 67 kg (147 lb 9.6 oz)   06/11/21 68.3 kg (150 lb " 9.6 oz)   04/30/21 65.9 kg (145 lb 4.8 oz)         MEDICATIONS:    Current Outpatient Medications   Medication Sig Dispense Refill     acetaminophen (TYLENOL) 500 MG tablet Take 2 tablets (1,000 mg) by mouth daily as needed for mild pain       aspirin 81 MG chewable tablet Take 1 tablet (81 mg) by mouth daily 108 tablet 3     Nutritional Supplements (ENSURE PLUS PO) Take 4 oz by mouth 3 times daily       OLANZapine (ZYPREXA) 2.5 MG tablet Take 2.5 mg by mouth At Bedtime       REVIEW OF SYSTEMS: Unobtainable secondary to cognitive impairment.   Objective: BP (!) 143/78   Pulse 78   Temp 97.6  F (36.4  C)   Resp 18   Wt 67.1 kg (147 lb 14.4 oz)   SpO2 95%   BMI 21.84 kg/m    Limited visit exam done given COVID-19 precautions.     Labs:   Labs done in SNF are in [facility]. Please refer to them using EPIC/Care Everywhere. and Recent labs in [facility] reviewed by me today.   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   Plan:   -Continue zyprexa 2.5 mg PO qday.    (M89.49) Primary osteoarthritis involving multiple joints  Comment: Chronic, denies pain  Plan:   -Continue PRN acetaminophen     (D62) Anemia due to blood loss, acute  (K92.0) Hematemesis without nausea  Comment: Historical, evaluated in ED.  Plan:   -Continue protonix 40 mg/day.  -CBC, BMP, TSH, A1c, LFTs next lab day       Phone call duration:  14 minutes  GI Ash CNP  [facility]               Sincerely,        Lexii Reid, ILSA

## 2021-08-18 NOTE — PROGRESS NOTES
"Telephone visit  [facility]  Lesvia Barney is a 89 year old year old adult who is being evaluated via a billable telephone visit due to the restrictions of the Covid-19 pandemic. The patient has been notified of following: \"This telephone visit will be conducted via a call between you and your provider. We have found that certain health care needs can be provided without the need for a physical exam. This service lets us provide the care you need with a short phone conversation. If a prescription is necessary we will work with the facility you are at and can send it directly to your pharmacy. If lab work is needed we can place an order to have it drawn at the facility you are at. If during the course of the call the provider feels a telephone visit is not appropriate, you will not be charged for this service.\"   Patient or Patient's first contact has given verbal consent for Telephone visit?  No  Place of Location at the time of visit: [facility]     Lesvia Barney complains of :   Chief Complaint   Patient presents with     [facility] Care Coordination - Telephone Follow-up     FVP Care Coordination - Home Visit-Annual Assessment     Annual Comprehensive Nursing Home     I have reviewed and updated the patient's Past Medical History, Social History, Family History and Medication List.  ALLERGIES:   Allergies   Allergen Reactions     No Known Allergies       HPI: HPI information obtained from: facility chart records, facility staff and patient report.    Resident resting in room, pleasant with writer. No complaints of behavioral concerns per staff. No longer ambulatory but able to propel around unit. History of hematemesis, hemoglobin stable.     Wt Readings from Last 4 Encounters:   08/18/21 67.1 kg (147 lb 14.4 oz)   08/11/21 67 kg (147 lb 9.6 oz)   06/11/21 68.3 kg (150 lb 9.6 oz)   04/30/21 65.9 kg (145 lb 4.8 oz)         MEDICATIONS:    Current Outpatient Medications   Medication Sig Dispense " Refill     acetaminophen (TYLENOL) 500 MG tablet Take 2 tablets (1,000 mg) by mouth daily as needed for mild pain       aspirin 81 MG chewable tablet Take 1 tablet (81 mg) by mouth daily 108 tablet 3     Nutritional Supplements (ENSURE PLUS PO) Take 4 oz by mouth 3 times daily       OLANZapine (ZYPREXA) 2.5 MG tablet Take 2.5 mg by mouth At Bedtime       REVIEW OF SYSTEMS: Unobtainable secondary to cognitive impairment.   Objective: BP (!) 143/78   Pulse 78   Temp 97.6  F (36.4  C)   Resp 18   Wt 67.1 kg (147 lb 14.4 oz)   SpO2 95%   BMI 21.84 kg/m    Limited visit exam done given COVID-19 precautions.     Labs:   Labs done in SNF are in [facility]. Please refer to them using [facility]Care Everywhere. and Recent labs in [facility] reviewed by me today.   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   Plan:   -Continue zyprexa 2.5 mg PO qday.    (M89.49) Primary osteoarthritis involving multiple joints  Comment: Chronic, denies pain  Plan:   -Continue PRN acetaminophen     (D62) Anemia due to blood loss, acute  (K92.0) Hematemesis without nausea  Comment: Historical, evaluated in ED.  Plan:   -Continue protonix 40 mg/day.  -CBC, BMP, TSH, A1c, LFTs next lab day       Phone call duration:  14 minutes  GI Ash CNP  [facility]

## 2021-08-21 NOTE — PROGRESS NOTES
"Lesvia Braney is a 89 year old female seen August 11, 2021 at [facility] where she has resided for 6 years (admit to Board and Care 8/2015) seen to follow up worsening dementia.   Pt is seen on the unit up to , pedaling about.   Does not give any meaningful history    Nursing staff reports she goes room to room in her WC, is intrusive and difficult to redirect.  She has been started on olanzapine for paranoia and delusional thinking, may be helping some.     Pt has many years of gradually progressive Alzheimer's dementia with significant functional decline.    No longer as well put together as she used to be, ueually refuses bathing or showers.   Has become a difficult transfer, needs 2-3 person assist for cares, and no longer ambulatory.  Has moved over to LTC because of higher care needs    She had a COVID19 infection in June 2020, recovered without hospitalization.      Pt had an April 2021 ED visit for coffee ground emesis.   Isolated incident and has not recurred.   She was given a 30 day course of pantoprazole.  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    Her POA is tony Barney [phone]    ROS:  +incontinence; BIMS 8/15  08/11/21 67 kg (147 lb 9.6 oz)   06/11/21 68.3 kg (150 lb 9.6 oz)   04/30/21 65.9 kg (145 lb 4.8 oz)   04/29/21 67.3 kg (148 lb 4.8 oz)      EXAM:  NAD, alert and oriented x1  BP (!) 143/78   Pulse 78   Temp 97.3  F (36.3  C)   Resp 18   Ht 1.753 m (5' 9\")   Wt 67 kg (147 lb 9.6 oz)   SpO2 96%   BMI 21.80 kg/m     Neck supple without adenopathy  Lungs with decreased BS, no rales or wheeze  Heart RRR s1s2, without " murmur  Abd soft, NT, no distention, +BS, no masses, no rebound or guarding.     Ext trace ankle edema L>R, valgus deformity of left knee, and chronic hip flexion  Neuro: no tremor or stiffness, +wordfinding difficulty, now WC bound  Psych: affect a bit irritable    Labs reviewed      IMP/PLAN:  (D45) Primary polycythemia (H)  Comment: numbers improved, no h/o lung disease or hypoxia  Plan: She is not a candidate for a workup, follow CBC prn         (M15.0) Primary osteoarthritis involving multiple joints   Comment: significant deforming changes of OA in LE joints, but no reports of pain.  Plan: Assist with transfers.   Ambulation with walker and directional cuing  Local measures and prn acetaminophen should she have evidence of pain           (G30.1,  F02.80) Late onset Alzheimer's disease without behavioral disturbance  Comment: gradual decline over past few years, low functional status   Plan: LTC for assist with mobility, meals, meds, cares, activity    (F69) Adult behavior problem  Comment: paranoia, agitation, aggressive resistance to cares   Plan: olanzapine 2.5 mg/HS      (K92.0) Coffee ground emesis    Comment: no recurrence   Plan: pantoprazole (PROTONIX) 40 MG EC tablet        Would decrease dose to 20 mg/day next visit.       Advanced directives: DNR/DNI.        Nabila Velasquez MD

## 2021-10-01 NOTE — PROGRESS NOTES
[facility] GERIATRIC SERVICES  Chief Complaint   Patient presents with     [facility]     [place] Medical Record Number:  [id number]  Place of Service where encounter took place:  [facility] () [[id number]]    HPI:    Lesvia Barney  is 89 year old (10/29/1928), who is being seen today for a federally mandated E/M visit. Today's concerns are:    1. Essential hypertension: No indications of acute pain at this time. Blood pressure range 121-164/67-90. No longer taking antihypertensive agents.    2. Late Alzheimer's disease:Unable to perform cognitive assessment. No behaviors noted by staff. Currently taking olanzapine 2.5 mg/day. Weight has been stable 140 lbs.     3. Primary polycythemia: Last platelet count 241.     ALLERGIES:No known allergies  PAST MEDICAL HISTORY:   Past Medical History:   Diagnosis Date     HTN (hypertension)      PAST SURGICAL HISTORY:   has a past surgical history that includes appendectomy; Open reduction internal fixation hip nailing (1/12/2014); Open reduction internal fixation hip (unk, prior to 2014); Arthroplasty hip (Right, 8/31/2015); and Remove hardware hip nailing (Right, 8/31/2015).  FAMILY HISTORY: family history includes C.A.D. in her father; Prostate Cancer in her brother.  SOCIAL HISTORY:  reports that she has never smoked. She has never used smokeless tobacco. She reports current alcohol use. She reports that she does not use drugs.    MEDICATIONS:     Review of your medicines          Accurate as of October 1, 2021  9:45 AM. If you have any questions, ask your nurse or doctor.            CONTINUE these medicines which have NOT CHANGED      Dose / Directions   acetaminophen 500 MG tablet  Commonly known as: TYLENOL  Used for: Primary osteoarthritis involving multiple joints      Dose: 1,000 mg  Take 2 tablets (1,000 mg) by mouth daily as needed for mild pain  Refills: 0     ENSURE PLUS PO      Dose: 4 oz  Take 4 oz by mouth 3 times daily  Refills: 0      OLANZapine 2.5 MG tablet  Commonly known as: zyPREXA      Dose: 2.5 mg  Take 2.5 mg by mouth At Bedtime  Refills: 0     pantoprazole 40 MG EC tablet  Commonly known as: PROTONIX  Used for: Coffee ground emesis      Dose: 40 mg  Take 1 tablet (40 mg) by mouth daily  Refills: 0           Case Management:  I have reviewed the care plan and MDS and do agree with the plan. Patient's desire to return to the community is not assessible due to cognitive impairment. Information reviewed:  Medications, vital signs, orders, and nursing notes.    ROS:  Unobtainable secondary to cognitive impairment.     Vitals:  /76   Pulse 80   Temp 97.5  F (36.4  C)   Resp 16   Wt 65.8 kg (145 lb)   SpO2 94%   BMI 21.41 kg/m    Body mass index is 21.41 kg/m .  Exam:  GENERAL APPEARANCE:  Alert  ENT:  Mouth and posterior oropharynx normal, moist mucous membranes, normal hearing acuity  RESP:  respiratory effort and palpation of chest normal, lungs clear to auscultation , no respiratory distress  CV:  Palpation and auscultation of heart done , regular rate and rhythm, no murmur, rub, or gallop  M/S:   Gait and station normal  Digits and nails normal  SKIN:  Inspection of skin and subcutaneous tissue baseline, Palpation of skin and subcutaneous tissue baseline  NEURO:   Cranial nerves 2-12 are normal tested and grossly at patient's baseline  PSYCH:  insight and judgement impaired, memory impaired , affect and mood normal    Lab/Diagnostic data:   Labs done in SNF are in [facility]. Please refer to them using [employer]/Care Everywhere. and Recent labs in [facility] reviewed by me today.     ASSESSMENT/PLAN  (I10) Essential hypertension  (primary encounter diagnosis)  Comment: Chronic, stable off antihypertensive agents.   Plan:   -No change at this time and adjustments as clinically indicated.     (G30.1,  F02.81) Late onset Alzheimer's disease with behavioral disturbance (H)  Comment: Chronic, progressive. No behaviors noted.   Plan:    -Continue olanzapine 2.5 mg/day  -Continue LTC support with medication administration, meals and safety.   -Expect further decline with progression of diease.      (D45) Primary polycythemia (H)  Comment: Numbers have improved. No h/o lung disease or hypoxia.   Plan:   -She is not a candidate for a workup, follow CBC prn.     The current medical regimen is effective; continue present plan and medications.      Electronically signed by:  GI Ash CNP  [facility]

## 2021-10-01 NOTE — LETTER
10/1/2021        RE: Lesvia Barney  [facility]  [address]  [facility] [address]        [facility] GERIATRIC SERVICES  Chief Complaint   Patient presents with     [facility]     [place] Medical Record Number:  [id number]  Place of Service where encounter took place:  [facility] () [[id number]]    HPI:    Lesvia Barney  is 89 year old (10/29/1928), who is being seen today for a federally mandated E/M visit. Today's concerns are:    1. Essential hypertension: No indications of acute pain at this time. Blood pressure range 121-164/67-90. No longer taking antihypertensive agents.    2. Late Alzheimer's disease:Unable to perform cognitive assessment. No behaviors noted by staff. Currently taking olanzapine 2.5 mg/day. Weight has been stable 140 lbs.     3. Primary polycythemia: Last platelet count 241.     ALLERGIES:No known allergies  PAST MEDICAL HISTORY:   Past Medical History:   Diagnosis Date     HTN (hypertension)      PAST SURGICAL HISTORY:   has a past surgical history that includes appendectomy; Open reduction internal fixation hip nailing (1/12/2014); Open reduction internal fixation hip (unk, prior to 2014); Arthroplasty hip (Right, 8/31/2015); and Remove hardware hip nailing (Right, 8/31/2015).  FAMILY HISTORY: family history includes C.A.D. in her father; Prostate Cancer in her brother.  SOCIAL HISTORY:  reports that she has never smoked. She has never used smokeless tobacco. She reports current alcohol use. She reports that she does not use drugs.    MEDICATIONS:     Review of your medicines          Accurate as of October 1, 2021  9:45 AM. If you have any questions, ask your nurse or doctor.            CONTINUE these medicines which have NOT CHANGED      Dose / Directions   acetaminophen 500 MG tablet  Commonly known as: TYLENOL  Used for: Primary osteoarthritis involving multiple joints      Dose: 1,000 mg  Take 2 tablets (1,000 mg) by mouth  daily as needed for mild pain  Refills: 0     ENSURE PLUS PO      Dose: 4 oz  Take 4 oz by mouth 3 times daily  Refills: 0     OLANZapine 2.5 MG tablet  Commonly known as: zyPREXA      Dose: 2.5 mg  Take 2.5 mg by mouth At Bedtime  Refills: 0     pantoprazole 40 MG EC tablet  Commonly known as: PROTONIX  Used for: Coffee ground emesis      Dose: 40 mg  Take 1 tablet (40 mg) by mouth daily  Refills: 0           Case Management:  I have reviewed the care plan and MDS and do agree with the plan. Patient's desire to return to the community is not assessible due to cognitive impairment. Information reviewed:  Medications, vital signs, orders, and nursing notes.    ROS:  Unobtainable secondary to cognitive impairment.     Vitals:  /76   Pulse 80   Temp 97.5  F (36.4  C)   Resp 16   Wt 65.8 kg (145 lb)   SpO2 94%   BMI 21.41 kg/m    Body mass index is 21.41 kg/m .  Exam:  GENERAL APPEARANCE:  Alert  ENT:  Mouth and posterior oropharynx normal, moist mucous membranes, normal hearing acuity  RESP:  respiratory effort and palpation of chest normal, lungs clear to auscultation , no respiratory distress  CV:  Palpation and auscultation of heart done , regular rate and rhythm, no murmur, rub, or gallop  M/S:   Gait and station normal  Digits and nails normal  SKIN:  Inspection of skin and subcutaneous tissue baseline, Palpation of skin and subcutaneous tissue baseline  NEURO:   Cranial nerves 2-12 are normal tested and grossly at patient's baseline  PSYCH:  insight and judgement impaired, memory impaired , affect and mood normal    Lab/Diagnostic data:   Labs done in SNF are in [facility]. Please refer to them using [employer]/Care Everywhere. and Recent labs in [facility] reviewed by me today.     ASSESSMENT/PLAN  (I10) Essential hypertension  (primary encounter diagnosis)  Comment: Chronic, stable off antihypertensive agents.   Plan:   -No change at this time and adjustments as clinically indicated.     (G30.1,  F02.81)  Late onset Alzheimer's disease with behavioral disturbance (H)  Comment: Chronic, progressive. No behaviors noted.   Plan:   -Continue olanzapine 2.5 mg/day  -Continue LTC support with medication administration, meals and safety.   -Expect further decline with progression of diease.      (D45) Primary polycythemia (H)  Comment: Numbers have improved. No h/o lung disease or hypoxia.   Plan:   -She is not a candidate for a workup, follow CBC prn.     The current medical regimen is effective; continue present plan and medications.      Electronically signed by:  GI Ash [facility]               Sincerely,        Lexii Reid NP

## 2021-10-19 NOTE — PROGRESS NOTES
[facility] GERIATRIC SERVICES  Chief Complaint   Patient presents with     [facility] Medical Record Number:  [id number]  Place of Service where encounter took place:  No question data found.    HPI:    Lesvia Barney  is 89 year old (10/29/1928), who is being seen today for a federally mandated E/M visit. Today's concerns are:  {FGS DX:403367}    ALLERGIES:No known allergies  PAST MEDICAL HISTORY:   Past Medical History:   Diagnosis Date     HTN (hypertension)      PAST SURGICAL HISTORY:   has a past surgical history that includes appendectomy; Open reduction internal fixation hip nailing (1/12/2014); Open reduction internal fixation hip (unk, prior to 2014); Arthroplasty hip (Right, 8/31/2015); and Remove hardware hip nailing (Right, 8/31/2015).  FAMILY HISTORY: family history includes C.A.D. in her father; Prostate Cancer in her brother.  SOCIAL HISTORY:  reports that she has never smoked. She has never used smokeless tobacco. She reports current alcohol use. She reports that she does not use drugs.    MEDICATIONS:     Review of your medicines          Accurate as of October 19, 2021 11:06 AM. If you have any questions, ask your nurse or doctor.            CONTINUE these medicines which have NOT CHANGED      Dose / Directions   acetaminophen 500 MG tablet  Commonly known as: TYLENOL  Used for: Primary osteoarthritis involving multiple joints      Dose: 1,000 mg  Take 2 tablets (1,000 mg) by mouth every 8 hours as needed for mild pain  Refills: 0     ENSURE PLUS PO      Dose: 4 oz  Take 4 oz by mouth 3 times daily  Refills: 0     OLANZapine 2.5 MG tablet  Commonly known as: zyPREXA      Dose: 2.5 mg  Take 2.5 mg by mouth At Bedtime  Refills: 0     pantoprazole 40 MG EC tablet  Commonly known as: PROTONIX  Used for: Coffee ground emesis      Dose: 40 mg  Take 1 tablet (40 mg) by mouth daily  Refills: 0         ***  Case Management:  I have reviewed the care plan and MDS and do agree with  "the plan. Patient's desire to return to the community is {FGS RETURN TO COMMUNITY:953446}. Information reviewed:  Medications, vital signs, orders, and nursing notes.    ROS:  {ROS FGS:605289}    Vitals:  /76   Temp 97.7  F (36.5  C)   Resp 18   Ht 1.753 m (5' 9\")   Wt 65.9 kg (145 lb 4.8 oz)   SpO2 94%   BMI 21.46 kg/m    Body mass index is 21.46 kg/m .  Exam:  {Nursing home physical exam :271589}    Lab/Diagnostic data:   {fgslab:421883}    ASSESSMENT/PLAN  {FGS DX2:251699}    {fgsorders:648263}  ***    {fgstime1:301075}    Electronically signed by:  Ana Harris MA***      "

## 2021-12-01 NOTE — LETTER
12/1/2021        RE: Lesvia Barney  [facility]  [address]  [facility] [address]        Lesvia Barney is a 89 year old female seen December 1, 2021 at [facility] where she has resided for 6 years (admit to Board and Care 8/2015) seen to follow up worsening dementia.     Pt is seen in her room resting abed.   She has not eating anything from her breakfast tray, has been refusing cares.   She is not able to give any meaningful history other than states she feels fine.      Nursing staff has previously reported she goes room to room in her WC, is intrusive and difficult to redirect.  She has been started on olanzapine for paranoia and delusional thinking, may be helping some.     Pt has many years of gradually progressive Alzheimer's dementia with significant functional decline.    No longer as well put together as she used to be, usually refuses bathing or showers.   Has become a difficult transfer, needs 2-3 person assist for cares, and no longer ambulatory.  Has moved over to LT because of higher care needs    She had a COVID19 infection in June 2020, recovered without hospitalization.      Pt had an April 2021 ED visit for coffee ground emesis.   Isolated incident and has not recurred.   She was given a 30 day course of pantoprazole.  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    Her POA is tony Barney [phone]    ROS:  +incontinence; BIMS 8/15  Wt Readings from Last 5 Encounters:   12/01/21 64.4 kg (142 lb)   10/17/21 65.9 kg (145 lb 4.8 oz)   10/01/21 65.8 kg (145 lb)   08/18/21 67.1 kg (147 lb 14.4 oz)  "  08/11/21 67 kg (147 lb 9.6 oz)      EXAM:  NAD, alert and oriented x1  /70   Pulse 67   Temp 97.5  F (36.4  C)   Resp 20   Ht 1.753 m (5' 9\")   Wt 64.4 kg (142 lb)   SpO2 95%   BMI 20.97 kg/m     Neck supple without adenopathy  Lungs with decreased BS, no rales or wheeze  Heart RRR s1s2, without murmur  Abd soft, NT, no distention, +BS, no masses, no rebound or guarding.     Ext trace ankle edema L>R, valgus deformity of left knee, and chronic hip flexion  Neuro: no tremor or stiffness, +wordfinding difficulty, now WC bound  Psych: affect okay    Labs reviewed      IMP/PLAN:  (D45) Primary polycythemia (H)  Comment: numbers improved, no h/o lung disease or hypoxia  Plan: She is not a candidate for a workup, follow CBC prn         (M15.0) Primary osteoarthritis involving multiple joints   Comment: significant deforming changes of OA in LE joints, but no reports of pain.  Plan: Assist with transfers.   Ambulation with walker and directional cuing  Local measures and prn acetaminophen should she have evidence of pain           (G30.1,  F02.80) Late onset Alzheimer's disease without behavioral disturbance  Comment: gradual decline over past few years, low functional status   Plan: LTC for assist with mobility, meals, meds, cares, activity    (F69) Adult behavior problem  Comment: paranoia, agitation, aggressive resistance to cares have improved with tx   Plan: olanzapine 2.5 mg/HS      (K92.0) Coffee ground emesis    Comment: no recurrence   Plan: pantoprazole (PROTONIX) 40 MG EC tablet        Would decrease dose to 20 mg/day next visit.       Advanced directives: DNR/DNI.        Nabila Velasquez MD           Sincerely,        Nabila Velasquez MD    "

## 2021-12-06 NOTE — PROGRESS NOTES
Lesvia Barney is a 89 year old female seen December 1, 2021 at [facility] where she has resided for 6 years (admit to Board and Care 8/2015) seen to follow up worsening dementia.     Pt is seen in her room resting abed.   She has not eating anything from her breakfast tray, has been refusing cares.   She is not able to give any meaningful history other than states she feels fine.      Nursing staff has previously reported she goes room to room in her WC, is intrusive and difficult to redirect.  She has been started on olanzapine for paranoia and delusional thinking, may be helping some.     Pt has many years of gradually progressive Alzheimer's dementia with significant functional decline.    No longer as well put together as she used to be, usually refuses bathing or showers.   Has become a difficult transfer, needs 2-3 person assist for cares, and no longer ambulatory.  Has moved over to LT because of higher care needs    She had a COVID19 infection in June 2020, recovered without hospitalization.      Pt had an April 2021 ED visit for coffee ground emesis.   Isolated incident and has not recurred.   She was given a 30 day course of pantoprazole.  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    Her POA is nephrodolfo Barney [phone]    ROS:  +incontinence; BIMS 8/15  Wt Readings from Last 5 Encounters:   12/01/21 64.4 kg (142 lb)   10/17/21 65.9 kg (145 lb 4.8 oz)   10/01/21 65.8 kg (145 lb)   08/18/21 67.1 kg (147 lb 14.4 oz)   08/11/21 67 kg (147 lb 9.6 oz)      EXAM:  NAD, alert and oriented x1  /70   Pulse 67   Temp 97.5  F (36.4  C)   Resp 20  "  Ht 1.753 m (5' 9\")   Wt 64.4 kg (142 lb)   SpO2 95%   BMI 20.97 kg/m     Neck supple without adenopathy  Lungs with decreased BS, no rales or wheeze  Heart RRR s1s2, without murmur  Abd soft, NT, no distention, +BS, no masses, no rebound or guarding.     Ext trace ankle edema L>R, valgus deformity of left knee, and chronic hip flexion  Neuro: no tremor or stiffness, +wordfinding difficulty, now WC bound  Psych: affect okay    Labs reviewed      IMP/PLAN:  (D45) Primary polycythemia (H)  Comment: numbers improved, no h/o lung disease or hypoxia  Plan: She is not a candidate for a workup, follow CBC prn         (M15.0) Primary osteoarthritis involving multiple joints   Comment: significant deforming changes of OA in LE joints, but no reports of pain.  Plan: Assist with transfers.   Ambulation with walker and directional cuing  Local measures and prn acetaminophen should she have evidence of pain           (G30.1,  F02.80) Late onset Alzheimer's disease without behavioral disturbance  Comment: gradual decline over past few years, low functional status   Plan: LTC for assist with mobility, meals, meds, cares, activity    (F69) Adult behavior problem  Comment: paranoia, agitation, aggressive resistance to cares have improved with tx   Plan: olanzapine 2.5 mg/HS      (K92.0) Coffee ground emesis    Comment: no recurrence   Plan: pantoprazole (PROTONIX) 40 MG EC tablet        Would decrease dose to 20 mg/day next visit.       Advanced directives: DNR/DNI.        Nabila Velasquez MD     "

## 2022-01-01 ENCOUNTER — DOCUMENTATION ONLY (OUTPATIENT)
Dept: GERIATRICS | Facility: CLINIC | Age: 87
End: 2022-01-01

## 2022-01-01 ENCOUNTER — TELEPHONE (OUTPATIENT)
Dept: GERIATRICS | Facility: CLINIC | Age: 87
End: 2022-01-01

## 2022-01-01 ENCOUNTER — NURSING HOME VISIT (OUTPATIENT)
Dept: GERIATRICS | Facility: CLINIC | Age: 87
End: 2022-01-01
Payer: COMMERCIAL

## 2022-01-01 ENCOUNTER — LAB REQUISITION (OUTPATIENT)
Dept: LAB | Facility: CLINIC | Age: 87
End: 2022-01-01
Payer: COMMERCIAL

## 2022-01-01 ENCOUNTER — PATIENT OUTREACH (OUTPATIENT)
Dept: GERIATRIC MEDICINE | Facility: CLINIC | Age: 87
End: 2022-01-01
Payer: COMMERCIAL

## 2022-01-01 ENCOUNTER — TELEPHONE (OUTPATIENT)
Dept: GERIATRICS | Facility: CLINIC | Age: 87
End: 2022-01-01
Payer: COMMERCIAL

## 2022-01-01 ENCOUNTER — CLINICAL UPDATE (OUTPATIENT)
Dept: PHARMACY | Facility: CLINIC | Age: 87
End: 2022-01-01
Payer: COMMERCIAL

## 2022-01-01 VITALS
BODY MASS INDEX: 21.25 KG/M2 | WEIGHT: 143.5 LBS | RESPIRATION RATE: 20 BRPM | HEART RATE: 68 BPM | OXYGEN SATURATION: 96 % | TEMPERATURE: 97.1 F | HEIGHT: 69 IN | DIASTOLIC BLOOD PRESSURE: 74 MMHG | SYSTOLIC BLOOD PRESSURE: 140 MMHG

## 2022-01-01 VITALS
SYSTOLIC BLOOD PRESSURE: 138 MMHG | TEMPERATURE: 96.8 F | HEART RATE: 66 BPM | WEIGHT: 144 LBS | BODY MASS INDEX: 21.33 KG/M2 | HEIGHT: 69 IN | DIASTOLIC BLOOD PRESSURE: 74 MMHG | OXYGEN SATURATION: 95 %

## 2022-01-01 VITALS
WEIGHT: 152.6 LBS | BODY MASS INDEX: 22.6 KG/M2 | HEIGHT: 69 IN | RESPIRATION RATE: 18 BRPM | OXYGEN SATURATION: 96 % | DIASTOLIC BLOOD PRESSURE: 76 MMHG | SYSTOLIC BLOOD PRESSURE: 112 MMHG | HEART RATE: 72 BPM | TEMPERATURE: 97 F

## 2022-01-01 DIAGNOSIS — I50.9 HEART FAILURE, UNSPECIFIED (H): ICD-10-CM

## 2022-01-01 DIAGNOSIS — I10 ESSENTIAL (PRIMARY) HYPERTENSION: ICD-10-CM

## 2022-01-01 DIAGNOSIS — F22 PARANOIA (H): ICD-10-CM

## 2022-01-01 DIAGNOSIS — E11.9 TYPE 2 DIABETES MELLITUS WITHOUT COMPLICATIONS (H): ICD-10-CM

## 2022-01-01 DIAGNOSIS — K11.7 DISTURBANCE OF SALIVARY SECRETION: ICD-10-CM

## 2022-01-01 DIAGNOSIS — F02.818 LATE ONSET ALZHEIMER'S DISEASE WITH BEHAVIORAL DISTURBANCE (H): ICD-10-CM

## 2022-01-01 DIAGNOSIS — D45 PRIMARY POLYCYTHEMIA (H): ICD-10-CM

## 2022-01-01 DIAGNOSIS — E87.1 HYPO-OSMOLALITY AND HYPONATREMIA: ICD-10-CM

## 2022-01-01 DIAGNOSIS — G30.1 LATE ONSET ALZHEIMER'S DISEASE WITHOUT BEHAVIORAL DISTURBANCE (H): ICD-10-CM

## 2022-01-01 DIAGNOSIS — D64.9 ANEMIA, UNSPECIFIED: ICD-10-CM

## 2022-01-01 DIAGNOSIS — F02.80 LATE ONSET ALZHEIMER'S DISEASE WITHOUT BEHAVIORAL DISTURBANCE (H): ICD-10-CM

## 2022-01-01 DIAGNOSIS — D45 PRIMARY POLYCYTHEMIA (H): Primary | ICD-10-CM

## 2022-01-01 DIAGNOSIS — M15.0 PRIMARY OSTEOARTHRITIS INVOLVING MULTIPLE JOINTS: ICD-10-CM

## 2022-01-01 DIAGNOSIS — N18.2 CHRONIC KIDNEY DISEASE, STAGE 2 (MILD): Primary | ICD-10-CM

## 2022-01-01 DIAGNOSIS — R45.1 AGITATION: ICD-10-CM

## 2022-01-01 DIAGNOSIS — F28 OTHER PSYCHOTIC DISORDER NOT DUE TO A SUBSTANCE OR KNOWN PHYSIOLOGICAL CONDITION (H): Primary | ICD-10-CM

## 2022-01-01 DIAGNOSIS — G30.1 LATE ONSET ALZHEIMER'S DISEASE WITH BEHAVIORAL DISTURBANCE (H): ICD-10-CM

## 2022-01-01 DIAGNOSIS — K92.2 GASTROINTESTINAL HEMORRHAGE, UNSPECIFIED GASTROINTESTINAL HEMORRHAGE TYPE: ICD-10-CM

## 2022-01-01 DIAGNOSIS — I10 PRIMARY HYPERTENSION: ICD-10-CM

## 2022-01-01 DIAGNOSIS — F69 ADULT BEHAVIOR PROBLEM: ICD-10-CM

## 2022-01-01 DIAGNOSIS — E03.9 HYPOTHYROIDISM, UNSPECIFIED: ICD-10-CM

## 2022-01-01 DIAGNOSIS — R06.02 SHORTNESS OF BREATH: Primary | ICD-10-CM

## 2022-01-01 DIAGNOSIS — R46.89 AGGRESSIVE BEHAVIOR: Primary | ICD-10-CM

## 2022-01-01 DIAGNOSIS — D62 ANEMIA DUE TO BLOOD LOSS, ACUTE: ICD-10-CM

## 2022-01-01 LAB
ANION GAP SERPL CALCULATED.3IONS-SCNC: 11 MMOL/L (ref 5–18)
ANION GAP SERPL CALCULATED.3IONS-SCNC: 13 MMOL/L (ref 5–18)
ANION GAP SERPL CALCULATED.3IONS-SCNC: 8 MMOL/L (ref 5–18)
BNP SERPL-MCNC: 134 PG/ML (ref 0–167)
BUN SERPL-MCNC: 23 MG/DL (ref 8–28)
BUN SERPL-MCNC: 24 MG/DL (ref 8–28)
BUN SERPL-MCNC: 24 MG/DL (ref 8–28)
CALCIUM SERPL-MCNC: 10.4 MG/DL (ref 8.5–10.5)
CALCIUM SERPL-MCNC: 9.4 MG/DL (ref 8.5–10.5)
CALCIUM SERPL-MCNC: 9.4 MG/DL (ref 8.5–10.5)
CHLORIDE BLD-SCNC: 105 MMOL/L (ref 98–107)
CHLORIDE BLD-SCNC: 106 MMOL/L (ref 98–107)
CHLORIDE BLD-SCNC: 107 MMOL/L (ref 98–107)
CO2 SERPL-SCNC: 23 MMOL/L (ref 22–31)
CO2 SERPL-SCNC: 25 MMOL/L (ref 22–31)
CO2 SERPL-SCNC: 25 MMOL/L (ref 22–31)
CREAT SERPL-MCNC: 0.75 MG/DL (ref 0.6–1.1)
CREAT SERPL-MCNC: 0.76 MG/DL (ref 0.6–1.1)
CREAT SERPL-MCNC: 0.94 MG/DL (ref 0.6–1.1)
ERYTHROCYTE [DISTWIDTH] IN BLOOD BY AUTOMATED COUNT: 16.9 % (ref 10–15)
ERYTHROCYTE [DISTWIDTH] IN BLOOD BY AUTOMATED COUNT: 17.6 % (ref 10–15)
ERYTHROCYTE [DISTWIDTH] IN BLOOD BY AUTOMATED COUNT: 17.6 % (ref 10–15)
GFR SERPL CREATININE-BSD FRML MDRD: 56 ML/MIN/1.73M2
GFR SERPL CREATININE-BSD FRML MDRD: 73 ML/MIN/1.73M2
GFR SERPL CREATININE-BSD FRML MDRD: 74 ML/MIN/1.73M2
GLUCOSE BLD-MCNC: 67 MG/DL (ref 70–125)
GLUCOSE BLD-MCNC: 74 MG/DL (ref 70–125)
GLUCOSE BLD-MCNC: 76 MG/DL (ref 70–125)
HBA1C MFR BLD: 5.2 %
HBA1C MFR BLD: 5.3 %
HCT VFR BLD AUTO: 45.7 % (ref 35–47)
HCT VFR BLD AUTO: 47.4 % (ref 35–47)
HCT VFR BLD AUTO: 50.8 % (ref 35–47)
HGB BLD-MCNC: 14.6 G/DL (ref 11.7–15.7)
HGB BLD-MCNC: 15.2 G/DL (ref 11.7–15.7)
HGB BLD-MCNC: 16 G/DL (ref 11.7–15.7)
MCH RBC QN AUTO: 27.7 PG (ref 26.5–33)
MCH RBC QN AUTO: 27.8 PG (ref 26.5–33)
MCH RBC QN AUTO: 29.4 PG (ref 26.5–33)
MCHC RBC AUTO-ENTMCNC: 31.5 G/DL (ref 31.5–36.5)
MCHC RBC AUTO-ENTMCNC: 31.9 G/DL (ref 31.5–36.5)
MCHC RBC AUTO-ENTMCNC: 32.1 G/DL (ref 31.5–36.5)
MCV RBC AUTO: 87 FL (ref 78–100)
MCV RBC AUTO: 88 FL (ref 78–100)
MCV RBC AUTO: 92 FL (ref 78–100)
PLATELET # BLD AUTO: 215 10E3/UL (ref 150–450)
PLATELET # BLD AUTO: 217 10E3/UL (ref 150–450)
PLATELET # BLD AUTO: 229 10E3/UL (ref 150–450)
POTASSIUM BLD-SCNC: 4.6 MMOL/L (ref 3.5–5)
POTASSIUM BLD-SCNC: 4.6 MMOL/L (ref 3.5–5)
POTASSIUM BLD-SCNC: 4.8 MMOL/L (ref 3.5–5)
RBC # BLD AUTO: 5.17 10E6/UL (ref 3.8–5.2)
RBC # BLD AUTO: 5.28 10E6/UL (ref 3.8–5.2)
RBC # BLD AUTO: 5.75 10E6/UL (ref 3.8–5.2)
SODIUM SERPL-SCNC: 140 MMOL/L (ref 136–145)
SODIUM SERPL-SCNC: 140 MMOL/L (ref 136–145)
SODIUM SERPL-SCNC: 143 MMOL/L (ref 136–145)
TSH SERPL DL<=0.005 MIU/L-ACNC: 3.59 UIU/ML (ref 0.3–5)
TSH SERPL DL<=0.005 MIU/L-ACNC: 4.23 UIU/ML (ref 0.3–5)
WBC # BLD AUTO: 6.9 10E3/UL (ref 4–11)
WBC # BLD AUTO: 7.2 10E3/UL (ref 4–11)
WBC # BLD AUTO: 7.4 10E3/UL (ref 4–11)

## 2022-01-01 PROCEDURE — 83036 HEMOGLOBIN GLYCOSYLATED A1C: CPT | Mod: ORL | Performed by: NURSE PRACTITIONER

## 2022-01-01 PROCEDURE — 99309 SBSQ NF CARE MODERATE MDM 30: CPT | Performed by: INTERNAL MEDICINE

## 2022-01-01 PROCEDURE — 80048 BASIC METABOLIC PNL TOTAL CA: CPT | Mod: ORL | Performed by: NURSE PRACTITIONER

## 2022-01-01 PROCEDURE — 36415 COLL VENOUS BLD VENIPUNCTURE: CPT | Mod: ORL | Performed by: NURSE PRACTITIONER

## 2022-01-01 PROCEDURE — 99309 SBSQ NF CARE MODERATE MDM 30: CPT | Performed by: NURSE PRACTITIONER

## 2022-01-01 PROCEDURE — P9604 ONE-WAY ALLOW PRORATED TRIP: HCPCS | Mod: ORL | Performed by: NURSE PRACTITIONER

## 2022-01-01 PROCEDURE — 83880 ASSAY OF NATRIURETIC PEPTIDE: CPT | Mod: ORL | Performed by: NURSE PRACTITIONER

## 2022-01-01 PROCEDURE — 85027 COMPLETE CBC AUTOMATED: CPT | Mod: ORL | Performed by: NURSE PRACTITIONER

## 2022-01-01 PROCEDURE — P9603 ONE-WAY ALLOW PRORATED MILES: HCPCS | Mod: ORL | Performed by: NURSE PRACTITIONER

## 2022-01-01 PROCEDURE — 84443 ASSAY THYROID STIM HORMONE: CPT | Mod: ORL | Performed by: NURSE PRACTITIONER

## 2022-01-01 PROCEDURE — 99207 PR NO CHARGE LOS: CPT | Performed by: PHARMACIST

## 2022-01-01 PROCEDURE — 99318 PR ANNUAL NURSING FAC ASSESSMNT, STABLE: CPT | Performed by: NURSE PRACTITIONER

## 2022-01-01 RX ORDER — MORPHINE SULFATE 30 MG/1
TABLET ORAL
Qty: 15 TABLET | Refills: 0 | Status: SHIPPED | OUTPATIENT
Start: 2022-01-01

## 2022-01-01 RX ORDER — ATROPINE SULFATE 10 MG/ML
2 SOLUTION/ DROPS OPHTHALMIC
Qty: 2 ML | Refills: 0 | Status: SHIPPED | OUTPATIENT
Start: 2022-01-01

## 2022-01-01 RX ORDER — LORAZEPAM 0.5 MG/1
0.5 TABLET ORAL EVERY 4 HOURS PRN
Qty: 10 TABLET | Refills: 0 | Status: SHIPPED | OUTPATIENT
Start: 2022-01-01

## 2022-01-01 RX ORDER — LORAZEPAM 0.5 MG/1
0.5 TABLET ORAL EVERY 4 HOURS PRN
COMMUNITY
End: 2022-01-01

## 2022-01-01 RX ORDER — MORPHINE SULFATE 30 MG/1
5 TABLET ORAL 3 TIMES DAILY
COMMUNITY
End: 2022-01-01

## 2022-01-01 RX ORDER — ATROPINE SULFATE 10 MG/ML
2 SOLUTION/ DROPS OPHTHALMIC
COMMUNITY
End: 2022-01-01

## 2022-01-29 NOTE — TELEPHONE ENCOUNTER
Staff reporting a bruise on right bicep 3cm by 3cm   Witnessed her bumping into her table while driving her electric wheelchair.    No new orders    Electronically signed by Tiffany Wilson RN, CNP

## 2022-02-18 NOTE — PROGRESS NOTES
This patient's medication list and chart were reviewed as part of the service provided by [facility].    Assessment/Recommendations:    No longer on ASA; continue to monitor CBC periodically.  Agree with MD to consider reduction in pantoprazole to 20mg daily.  Consider this dose for 2 weeks, then d'c since no longer on ASA.  In future, if no longer with upsetting hallucinations or delusions/paranoia, and behaviors are not disrupting cares, consider change in olanzapine to 2.5mg daily prn.    Meena Lerner, Pharm.D.,[facility]  Board Certified Geriatric Pharmacist  Medication Therapy Management Pharmacist  [phone]

## 2022-03-21 NOTE — PROGRESS NOTES
[facility] GERIATRICS  Chief Complaint   Patient presents with     alf Regulatory     [place] Medical Record Number:  [id number]  Place of Service where encounter took place:  [facility] () [[id number]]    HPI:    Lesvia Barney  is 89 year old (10/29/1928), who is being seen today for a federally mandated E/M visit. Today's concerns are:  Resting in room today; pleasant and offered quick smile. Alert to self only and able to answer simple yes/no questions. Denies CP, SOB or lightheadedness. Last BIMS 03/15, without recent falls, assist of 2 and transfer belt. Uses bathroom but intermittently incontinent of urine. Quiet today and offered little HPI.     BP Readings from Last 3 Encounters:   03/21/22 138/74   12/01/21 128/70   10/11/21 121/76     Pulse Readings from Last 4 Encounters:   03/21/22 66   12/01/21 67   10/01/21 80   08/18/21 78     Wt Readings from Last 4 Encounters:   03/21/22 65.3 kg (144 lb)   12/01/21 64.4 kg (142 lb)   10/17/21 65.9 kg (145 lb 4.8 oz)   10/01/21 65.8 kg (145 lb)         ALLERGIES:No known allergies  PAST MEDICAL HISTORY:   Past Medical History:   Diagnosis Date     HTN (hypertension)      PAST SURGICAL HISTORY:   has a past surgical history that includes appendectomy; Open reduction internal fixation hip nailing (1/12/2014); Open reduction internal fixation hip (unk, prior to 2014); Arthroplasty hip (Right, 8/31/2015); and Remove hardware hip nailing (Right, 8/31/2015).  FAMILY HISTORY: family history includes C.A.D. in her father; Prostate Cancer in her brother.  SOCIAL HISTORY:  reports that she has never smoked. She has never used smokeless tobacco. She reports current alcohol use. She reports that she does not use drugs.    MEDICATIONS:     Review of your medicines          Accurate as of March 21, 2022  1:29 PM. If you have any questions, ask your nurse or doctor.            CONTINUE these medicines which have NOT CHANGED      Dose / Directions    acetaminophen 500 MG tablet  Commonly known as: TYLENOL  Used for: Primary osteoarthritis involving multiple joints      Dose: 1,000 mg  Take 2 tablets (1,000 mg) by mouth every 8 hours as needed for mild pain  Refills: 0     ENSURE PLUS PO      Dose: 4 oz  Take 4 oz by mouth 3 times daily  Refills: 0     OLANZapine 2.5 MG tablet  Commonly known as: zyPREXA      Dose: 2.5 mg  Take 2.5 mg by mouth At Bedtime  Refills: 0     pantoprazole 40 MG EC tablet  Commonly known as: PROTONIX  Used for: Coffee ground emesis      Dose: 40 mg  Take 1 tablet (40 mg) by mouth daily  Refills: 0           Case Management:  I have reviewed the care plan and MDS and do agree with the plan. Patient's desire to return to the community is not assessible due to cognitive impairment. Information reviewed:  Medications, vital signs, orders, and nursing notes.    ROS:  Unobtainable secondary to cognitive impairment.     Vitals:  There were no vitals taken for this visit.  There is no height or weight on file to calculate BMI.  Exam:  GENERAL APPEARANCE:  Alert, in no distress  ENT:  Mouth and posterior oropharynx normal, moist mucous membranes, normal hearing acuity  RESP:  respiratory effort and palpation of chest normal, lungs clear to auscultation   CV:  Palpation and auscultation of heart done , regular rate and rhythm, no murmur, rub, or gallop  M/S:   Gait and station normal  Digits and nails normal  SKIN:  Inspection of skin and subcutaneous tissue baseline, Palpation of skin and subcutaneous tissue baseline  NEURO:   Cranial nerves 2-12 are normal tested and grossly at patient's baseline  PSYCH:  insight and judgement impaired, memory impaired , affect and mood normal    Lab/Diagnostic data:   Labs done in SNF are in [facility]. Please refer to them using [employer]/Care Everywhere. and Recent labs in EPIC reviewed by me today.     ASSESSMENT/PLAN   Diagnosis Comments   1. 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 Weight stable and without recent falls. Able to answer simple yes/no questions. Functional decline and now extensive assist of 2 with transfers.   -Expect further decline with progression of disease. Behaviors are stable.  -Continue zyprexa 2.5 mg PO every other day.   -Continue LTC support with medication administration, meals and safety.    2. Primary polycythemia (H)  Chronic, numbers stable. No hypoxia noted.   -No further workup given goals of care. Follow PRN CBC   3. 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   -Continue LTC assist with medication administration, meals and safety.   -See above for behaviors.        The current medical regimen is effective; continue present plan and medications.      Electronically signed by:  GI Ash [facility]   [phone]

## 2022-03-22 NOTE — LETTER
3/22/2022        RE: Lesvia Barney  [facility]  [address]  [facility] [address]        [facility] GERIATRICS  Chief Complaint   Patient presents with     custodial Regulatory     [place] Medical Record Number:  [id number]  Place of Service where encounter took place:  [facility] () [[id number]]    HPI:    Lesvia Barney  is 89 year old (10/29/1928), who is being seen today for a federally mandated E/M visit. Today's concerns are:  Resting in room today; pleasant and offered quick smile. Alert to self only and able to answer simple yes/no questions. Denies CP, SOB or lightheadedness. Last BIMS 03/15, without recent falls, assist of 2 and transfer belt. Uses bathroom but intermittently incontinent of urine. Quiet today and offered little HPI.     BP Readings from Last 3 Encounters:   03/21/22 138/74   12/01/21 128/70   10/11/21 121/76     Pulse Readings from Last 4 Encounters:   03/21/22 66   12/01/21 67   10/01/21 80   08/18/21 78     Wt Readings from Last 4 Encounters:   03/21/22 65.3 kg (144 lb)   12/01/21 64.4 kg (142 lb)   10/17/21 65.9 kg (145 lb 4.8 oz)   10/01/21 65.8 kg (145 lb)         ALLERGIES:No known allergies  PAST MEDICAL HISTORY:   Past Medical History:   Diagnosis Date     HTN (hypertension)      PAST SURGICAL HISTORY:   has a past surgical history that includes appendectomy; Open reduction internal fixation hip nailing (1/12/2014); Open reduction internal fixation hip (unk, prior to 2014); Arthroplasty hip (Right, 8/31/2015); and Remove hardware hip nailing (Right, 8/31/2015).  FAMILY HISTORY: family history includes C.A.D. in her father; Prostate Cancer in her brother.  SOCIAL HISTORY:  reports that she has never smoked. She has never used smokeless tobacco. She reports current alcohol use. She reports that she does not use drugs.    MEDICATIONS:     Review of your medicines          Accurate as of March 21, 2022  1:29 PM. If you have  any questions, ask your nurse or doctor.            CONTINUE these medicines which have NOT CHANGED      Dose / Directions   acetaminophen 500 MG tablet  Commonly known as: TYLENOL  Used for: Primary osteoarthritis involving multiple joints      Dose: 1,000 mg  Take 2 tablets (1,000 mg) by mouth every 8 hours as needed for mild pain  Refills: 0     ENSURE PLUS PO      Dose: 4 oz  Take 4 oz by mouth 3 times daily  Refills: 0     OLANZapine 2.5 MG tablet  Commonly known as: zyPREXA      Dose: 2.5 mg  Take 2.5 mg by mouth At Bedtime  Refills: 0     pantoprazole 40 MG EC tablet  Commonly known as: PROTONIX  Used for: Coffee ground emesis      Dose: 40 mg  Take 1 tablet (40 mg) by mouth daily  Refills: 0           Case Management:  I have reviewed the care plan and MDS and do agree with the plan. Patient's desire to return to the community is not assessible due to cognitive impairment. Information reviewed:  Medications, vital signs, orders, and nursing notes.    ROS:  Unobtainable secondary to cognitive impairment.     Vitals:  There were no vitals taken for this visit.  There is no height or weight on file to calculate BMI.  Exam:  GENERAL APPEARANCE:  Alert, in no distress  ENT:  Mouth and posterior oropharynx normal, moist mucous membranes, normal hearing acuity  RESP:  respiratory effort and palpation of chest normal, lungs clear to auscultation   CV:  Palpation and auscultation of heart done , regular rate and rhythm, no murmur, rub, or gallop  M/S:   Gait and station normal  Digits and nails normal  SKIN:  Inspection of skin and subcutaneous tissue baseline, Palpation of skin and subcutaneous tissue baseline  NEURO:   Cranial nerves 2-12 are normal tested and grossly at patient's baseline  PSYCH:  insight and judgement impaired, memory impaired , affect and mood normal    Lab/Diagnostic data:   Labs done in SNF are in [facility]. Please refer to them using [employer]/Care Everywhere. and Recent labs in EPIC reviewed  by me today.     ASSESSMENT/PLAN   Diagnosis Comments   1. Other psychotic disorder not due to a substance or known physiological condition (H)  Chronic, progressive. Weight stable and without recent falls. Able to answer simple yes/no questions. Functional decline and now extensive assist of 2 with transfers.   -Expect further decline with progression of disease. Behaviors are stable.  -Continue zyprexa 2.5 mg PO every other day.   -Continue LTC support with medication administration, meals and safety.    2. Primary polycythemia (H)  Chronic, numbers stable. No hypoxia noted.   -No further workup given goals of care. Follow PRN CBC   3. 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   -Continue LTC assist with medication administration, meals and safety.   -See above for behaviors.        The current medical regimen is effective; continue present plan and medications.      Electronically signed by:  GI Ash [facility]   [phone]            Sincerely,        Lexii Reid NP

## 2022-03-31 PROBLEM — F28 OTHER PSYCHOTIC DISORDER NOT DUE TO A SUBSTANCE OR KNOWN PHYSIOLOGICAL CONDITION (H): Status: ACTIVE | Noted: 2022-01-01

## 2022-04-08 NOTE — TELEPHONE ENCOUNTER
[facility] [place] [facility] Triage Nurse Telephone Encounter    Provider: GI Hercules CNP  Facility: [facility] Facility Type:  LTC    Caller: Ambika    Allergies:    Allergies   Allergen Reactions     No Known Allergies         Reason for call: Nursing is calling to ask for labs, pt has not had any labs for over 1 year.     Verbal Order/Direction given by Provider: OK for BMP, CBC, A1C and TSH next lab day.  Dx HTN and yearly monitoring     Provider giving Order:  GI Hercules CNP    Verbal Order given to: Janelle Lara RN

## 2022-04-13 NOTE — TELEPHONE ENCOUNTER
[facility] [place] [facility] Triage Nurse Telephone Encounter    Provider: GI Hercules CNP  Facility: [facility] Facility Type:  [facility]    Caller: Jean   Call Back Number: [phone]    Allergies:    Allergies   Allergen Reactions     No Known Allergies         Reason for call: BMP, CBC, A1c and TSH  All stable and comparable.     Verbal Order/Direction given by Provider: RAKESH    Provider giving Order:  GI Hercules CNP    Verbal Order given to: Jean Lara RN

## 2022-04-27 NOTE — LETTER
LESVIA LIVINGSTON  [facility]  [address]  [facility] [address]    April 27, 2022          Dear Lesvia:    As a member of [facility] ([facility] ([facility]), you are provided a care coordinator. I will be your new care coordinator as of 5/1/2022. I will be calling you soon to see how you are doing and determine your needs.    If you have any questions, please feel free to call me at [phone]. If you reach my voice mail, please leave a message and your phone number. If you are hearing impaired, please call the [place] Relay at [phone] or [phone] ([id number] relay service).    I look forward to speaking with you soon.    Sincerely,    [email or web address]  Phone: [phone]      [facility] is a health plan that contracts with both Medicare and the [place] Medical Assistance (Medicaid) program to provide benefits of both programs to enrollees. Enrollment in [facility] depends on contract renewal.      [facility]  [id number] DHS Approved ([id number])  [id number] (11/18)

## 2022-04-27 NOTE — PROGRESS NOTES
[facility] Care Coordination Contact    Internal CC change effective 5/1/2022.  Mailed member CC Change letter.  Additional tasks to be completed by CMS include: update database & EPIC and create member files on [employer].    Lenora Stephens  Case Management Specialist  [facility]  [phone]

## 2022-05-11 NOTE — LETTER
5/11/2022        RE: Lesvia Barney  [facility]  [address]  [facility] [address]        Lesvia Barney is a 89 year old female seen May 11, 2022 at [facility] where she has resided for >6 years (admit to Board and Care 8/2015) seen to follow up worsening dementia.   Pt is seen in her room with two aides attempting to change her soiled incontinence brief.  Pt is combatively resistant, pushing, striking, scratching and verbally abusive.   She has increased muscle tone and still quite strong in her arms.  She does not respond to attempts to quiet her or redirect.   Nursing staff reports this is the usually the case with all cares, making it difficult to maintain patient's hygiene and safety.         By chart review, pt has had many years of gradually progressive Alzheimer's dementia with significant functional decline. No longer as well put together as she used to be, usually refuses bathing or showers.   Has become a difficult transfer, needs 2-3 person assist for cares, and no longer ambulatory.  Has moved over to LTC because of higher care needs    Can be paranoid, intrusive, wandering into other resident's room in her WC   She had a COVID19 infection in June 2020, recovered.      Pt had an April 2021 ED visit for coffee ground emesis.   Isolated incident and has not recurred.  She was given a 30 day course of pantoprazole.  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    Her POA is nephrodolfo Barney [phone]    ROS:  +incontinence; BIMS 8/15  Wt Readings from Last 5 Encounters:   05/11/22 65.1 kg (143 lb 8 oz)   03/21/22  "65.3 kg (144 lb)   12/01/21 64.4 kg (142 lb)   10/17/21 65.9 kg (145 lb 4.8 oz)   10/01/21 65.8 kg (145 lb)      EXAM:  NAD, alert and oriented x1  BP (!) 140/74   Pulse 68   Temp 97.1  F (36.2  C)   Resp 20   Ht 1.753 m (5' 9\")   Wt 65.1 kg (143 lb 8 oz)   SpO2 96%   BMI 21.19 kg/m     Neck supple without adenopathy  Lungs with decreased BS, no rales or wheeze  Heart RRR s1s2, without murmur  Abd soft, NT, no distention, +BS, no masses, no rebound or guarding.     Ext trace ankle edema L>R, valgus deformity of left knee, and chronic hip flexion  Neuro: increased tone, no tremor, +wordfinding difficulty, now WC bound  Psych: affect flat, combative     Labs reviewed      IMP/PLAN:  (R46.89) Aggressive behavior    (F22) Paranoia (H)  Comment: makes routine cares very difficult and unsafe  She is at risk of falling, or hurting herself or staff     Plan: start trazodone 25 mg bid on half hour before cares, she is she is able to allow them.     Continue olanzapine 2.5 mg/HS      (D45) Primary polycythemia (H)  Comment: numbers improved, no h/o lung disease or hypoxia  Plan: She is not a candidate for a workup, follow CBC prn         (M15.0) Primary osteoarthritis involving multiple joints   Comment: significant deforming changes of OA in LE joints, but no reports of pain.  Plan: Assist with transfers.   Ambulation with walker and directional cuing  Local measures and prn acetaminophen should she have evidence of pain           (G30.1,  F02.80) Late onset Alzheimer's disease without behavioral disturbance  Comment: gradual decline over past few years, low functional status   Plan: LTC for assist with mobility, meals, meds, cares, activity     (K92.0) Coffee ground emesis    Comment: no recurrence   Plan: pantoprazole (PROTONIX) 40 MG EC tablet        Would decrease dose to 20 mg/day next visit.       Advanced directives: DNR/DNI.     Message left for tony Lindsey.       Nabila Velasquez MD "           Sincerely,        Nabila Velasquez MD

## 2022-05-18 NOTE — PROGRESS NOTES
Lesvia Barney is a 89 year old female seen May 11, 2022 at [facility] where she has resided for >6 years (admit to Board and Care 8/2015) seen to follow up worsening dementia.   Pt is seen in her room with two aides attempting to change her soiled incontinence brief.  Pt is combatively resistant, pushing, striking, scratching and verbally abusive.   She has increased muscle tone and still quite strong in her arms.  She does not respond to attempts to quiet her or redirect.   Nursing staff reports this is the usually the case with all cares, making it difficult to maintain patient's hygiene and safety.         By chart review, pt has had many years of gradually progressive Alzheimer's dementia with significant functional decline. No longer as well put together as she used to be, usually refuses bathing or showers.   Has become a difficult transfer, needs 2-3 person assist for cares, and no longer ambulatory.  Has moved over to LT because of higher care needs    Can be paranoid, intrusive, wandering into other resident's room in her WC   She had a COVID19 infection in June 2020, recovered.      Pt had an April 2021 ED visit for coffee ground emesis.   Isolated incident and has not recurred.  She was given a 30 day course of pantoprazole.  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    Her POA is nephrodolfo Barney [phone]    ROS:  +incontinence; BIMS 8/15  Wt Readings from Last 5 Encounters:   05/11/22 65.1 kg (143 lb 8 oz)   03/21/22 65.3 kg (144 lb)   12/01/21 64.4 kg (142 lb)   10/17/21 65.9 kg (145 lb 4.8 oz)   10/01/21 65.8 kg (145 lb)      EXAM:  NAD,  "alert and oriented x1  BP (!) 140/74   Pulse 68   Temp 97.1  F (36.2  C)   Resp 20   Ht 1.753 m (5' 9\")   Wt 65.1 kg (143 lb 8 oz)   SpO2 96%   BMI 21.19 kg/m     Neck supple without adenopathy  Lungs with decreased BS, no rales or wheeze  Heart RRR s1s2, without murmur  Abd soft, NT, no distention, +BS, no masses, no rebound or guarding.     Ext trace ankle edema L>R, valgus deformity of left knee, and chronic hip flexion  Neuro: increased tone, no tremor, +wordfinding difficulty, now WC bound  Psych: affect flat, combative     Labs reviewed      IMP/PLAN:  (R46.89) Aggressive behavior    (F22) Paranoia (H)  Comment: makes routine cares very difficult and unsafe  She is at risk of falling, or hurting herself or staff     Plan: start trazodone 25 mg bid on half hour before cares, she is she is able to allow them.     Continue olanzapine 2.5 mg/HS      (D45) Primary polycythemia (H)  Comment: numbers improved, no h/o lung disease or hypoxia  Plan: She is not a candidate for a workup, follow CBC prn         (M15.0) Primary osteoarthritis involving multiple joints   Comment: significant deforming changes of OA in LE joints, but no reports of pain.  Plan: Assist with transfers.   Ambulation with walker and directional cuing  Local measures and prn acetaminophen should she have evidence of pain           (G30.1,  F02.80) Late onset Alzheimer's disease without behavioral disturbance  Comment: gradual decline over past few years, low functional status   Plan: LTC for assist with mobility, meals, meds, cares, activity     (K92.0) Coffee ground emesis    Comment: no recurrence   Plan: pantoprazole (PROTONIX) 40 MG EC tablet        Would decrease dose to 20 mg/day next visit.       Advanced directives: DNR/DNI.     Message left for tony Lindsey.       Nabila Velasquez MD     "

## 2022-06-10 NOTE — TELEPHONE ENCOUNTER
[facility] [place] [facility] Triage Nurse Telephone Encounter    Provider: GI Hercules CNP  Facility: [facility] Facility Type:  LT    Caller: Joseline  Call Back Number: [phone]    Allergies:    Allergies   Allergen Reactions     No Known Allergies         Reason for call: Nurse reporting concerns regarding weight gain.  Patient has 3+ bilateral pitting LE edema.  Patient does not wear any LE compression.  Not cooperative with elevation.  No cough or SOB.  Lungs are clear posteriorly.  No BP meds or diuretics.  Nurse states that patient is a mickey lift for transfers.      Verbal Order/Direction given by Provider: Lasix 10mg daily x 3 days.  Check Heme 2 and BNP on 6/14/22 and BMP on 6/17/22.  Daily weights before breakfast x 7 days and update provider using the mickey lift scale.      Provider giving Order:  GI Hercules CNP    Verbal Order given to: Joseline/Fito Gomes RN

## 2022-06-15 NOTE — TELEPHONE ENCOUNTER
[facility] [place] [facility] Lab Note     Provider: GI Hercules CNP  Facility: [facility] Facility Type:  [facility]    Allergies   Allergen Reactions     No Known Allergies        Labs Reviewed by provider: Heme 2, BNP     Verbal Order/Direction given by Provider: No new orders.      Provider giving Order:  Nabila Velasquez MD    Verbal Order given to: Fito Gomes RN

## 2022-06-20 NOTE — TELEPHONE ENCOUNTER
[facility] [place] [facility] Triage Nurse Telephone Encounter    Provider: GI Hercules CNP  Facility: [facility] Facility Type:  LTC    Caller: Lenore       Allergies:    Allergies   Allergen Reactions     No Known Allergies         Reason for call: Pt not using her w/c pedals and not positioned in the w/c well and nursing is requesting OT eval. Pt also noted to have an intact blister on her Rt inner heal. Not sure what caused the blister.     Verbal Order/Direction given by Provider: OT to eval and treat, skin prep to blister bid until resolved. Call if any changes.     Provider giving Order:  Nabila Velasquez MD    Verbal Order given to: Lenore Lara RN

## 2022-07-01 NOTE — LETTER
7/1/2022        RE: Lesvia Barney  [facility]  [address]  [address]  [facility] [address]        [facility] GERIATRICS  Chief Complaint   Patient presents with     Annual Comprehensive Nursing Home     [facility] Record Number:  [id number]  Place of Service where encounter took place:  [facility] () [[id number]]    HPI:    Lesvia Barney  is a 89 year old  (10/29/1928), who is being seen today for an annual comprehensive visit. HPI information obtained from: facility chart records, facility staff and patient report.   Patient resting in room upon visit. Smiled during exam but non-verbal with writer. Difficult to obtain HPI 2/2 degree of cognitive impairment. Per staff, has had some difficulties with resistance to cares. Started on trazodone 6/15 and remains on olanzapine. No indications of acute pain, SOB or lightheadedness. Without recent falls.       Wt Readings from Last 4 Encounters:   07/01/22 69.2 kg (152 lb 9.6 oz)   05/11/22 65.1 kg (143 lb 8 oz)   03/21/22 65.3 kg (144 lb)   12/01/21 64.4 kg (142 lb)       BP Readings from Last 3 Encounters:   07/01/22 112/76   05/11/22 (!) 140/74   03/21/22 138/74     Pulse Readings from Last 4 Encounters:   07/01/22 72   05/11/22 68   03/21/22 66   12/01/21 67         ALLERGIES: No known allergies  PAST MEDICAL HISTORY:   Past Medical History:   Diagnosis Date     HTN (hypertension)       PAST SURGICAL HISTORY:  has a past surgical history that includes appendectomy; Open reduction internal fixation hip nailing (1/12/2014); Open reduction internal fixation hip (unk, prior to 2014); Arthroplasty hip (Right, 8/31/2015); and Remove hardware hip nailing (Right, 8/31/2015).  IMMUNIZATIONS:  Immunization History   Administered Date(s) Administered     COVID-19,PF,Moderna 01/07/2021, 02/03/2021     COVID-19,PF,Pfizer (12+ Yrs) 01/05/2021, 02/02/2021     Flu, Unspecified 01/01/2014, 10/24/2019     Influenza (IIV3) PF  11/09/1999, 12/06/2000, 11/29/2001, 11/26/2002, 12/08/2003, 10/29/2012, 01/01/2014, 10/21/2015, 10/20/2016, 10/03/2017, 10/11/2018, 10/24/2019     Influenza Vaccine Im 4yrs+ 4 Valent CCIIV4 10/24/2019     Influenza, Quad, High Dose, Pf, 65yr+ (Fluzone HD) 12/31/2013, 10/23/2019     Mantoux Tuberculin Skin Test 08/06/2015, 08/07/2015     Pneumo Conj 13-V (2010&after) 07/28/2015, 10/15/2015     Pneumococcal 23 valent 11/08/1999     Pneumococcal Conjugate, Unspecified 10/14/2015     Pneumococcal, Unspecified 10/15/2015     TDAP Vaccine (Adacel) 10/14/2015     Td (Adult), Adsorbed 10/15/2015     Tdap (Adacel,Boostrix) 10/15/2015     Zoster vaccine recombinant adjuvanted (SHINGRIX) 10/28/2012     Zoster vaccine, live 10/29/2012     Above immunizations pulled from [facility]. MIIC and facility records also reconciled. Outstanding information sent to  to update [place].  Future immunizations are not needed at this point as all recommended immunizations are up to date.       Current Outpatient Medications:      acetaminophen (TYLENOL) 500 MG tablet, Take 2 tablets (1,000 mg) by mouth every 8 hours as needed for mild pain, Disp: , Rfl:      Nutritional Supplements (ENSURE PLUS PO), Take 4 oz by mouth 3 times daily, Disp: , Rfl:      OLANZapine (ZYPREXA) 2.5 MG tablet, Take 2.5 mg by mouth every other day, Disp: , Rfl:      pantoprazole (PROTONIX) 40 MG EC tablet, Take 1 tablet (40 mg) by mouth daily, Disp: , Rfl:      Case Management:  I have reviewed the facility/SNF care plan/MDS, including the falls risk, nutrition and pain screening. I also reviewed the current immunizations, and preventive care.. Future cancer screening is not clinically indicated secondary to age/goals of care Patient's desire to return to the community is not assessible due to cognitive impairment. Current Level of Care is appropriate.    Advance Directive Discussion:    I reviewed the current advanced directives as reflected  "in EPIC, the POLST and the facility chart, and verified the congruency of orders. I did not contacted the first party and discussed the plan of Care.  I did not due to cognitive impairment review the advance directives with the resident. Patient's goal is unobtainable secondary to cognitive impairment.    Team Discussion:  I communicated with the appropriate disciplines involved with the Plan of Care:   Nursing  ,    and Dietitian    Information reviewed:  Medications, vital signs, orders, and nursing notes.    ROS:  Unobtainable secondary to cognitive impairment.     Vitals:  /76   Pulse 72   Temp 97  F (36.1  C)   Resp 18   Ht 1.753 m (5' 9\")   Wt 69.2 kg (152 lb 9.6 oz)   SpO2 96%   BMI 22.54 kg/m   Body mass index is 22.54 kg/m .  Exam:  GENERAL APPEARANCE:  Alert, in no distress  ENT:  Mouth and posterior oropharynx normal, moist mucous membranes, [place]  RESP:  respiratory effort and palpation of chest normal, lungs clear to auscultation   CV:  Palpation and auscultation of heart done , regular rate and rhythm, no murmur, rub, or gallop  ABDOMEN:  normal bowel sounds, soft, nontender, no hepatosplenomegaly or other masses  M/S:   Gait and station normal  Digits and nails normal  SKIN:  Inspection of skin and subcutaneous tissue baseline, Palpation of skin and subcutaneous tissue baseline  NEURO:   Cranial nerves 2-12 are normal tested and grossly at patient's baseline  PSYCH:  insight and judgement impaired, memory impaired , affect and mood normal     Lab/Diagnostic data:   Labs done in SNF are in [facility]. Please refer to them using [employer]Care Everywhere. and Recent labs in [facility] reviewed by me today.     ASSESSMENT/PLAN   Diagnosis Comments   1. Chronic kidney disease, stage 2 (mild)  Chronic, baseline 0.67-0.94. Continue to renal dose and avoid nephrotoxic medications. BMP q 6 months and PRN.    2. Primary osteoarthritis involving multiple joints  Chronic, no longer ambulatory. " Pain managed on PRN acetaminophen.    3. Primary hypertension  Chronic, stable off medications. Goal BP <140/90.    4. 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 Just recently started on trazodone. Will continue to monitor. Expect further decline with progression of disease. Continue LTC support with medication administration, meals and safety.    5. Anemia due to blood loss, acute  Chronic, hemoglobin stable 15.2. CBC q6 months    6. Adult behavior problem  2/2 progression of dementia. See above. Continue current medications and monitor.          Orders:  The current medical regimen is effective; continue present plan and medications.      Electronically signed by:  Lexii Reid NP            Sincerely,        Lexii Reid NP

## 2022-07-01 NOTE — PROGRESS NOTES
[facility] GERIATRICS  Chief Complaint   Patient presents with     Annual Comprehensive Nursing Home     [facility] Medical Record Number:  [id number]  Place of Service where encounter took place:  [facility] () [[id number]]    HPI:    Lesvia Banrey  is a 89 year old  (10/29/1928), who is being seen today for an annual comprehensive visit. HPI information obtained from: facility chart records, facility staff and patient report.   Patient resting in room upon visit. Smiled during exam but non-verbal with writer. Difficult to obtain HPI 2/2 degree of cognitive impairment. Per staff, has had some difficulties with resistance to cares. Started on trazodone 6/15 and remains on olanzapine. No indications of acute pain, SOB or lightheadedness. Without recent falls.       Wt Readings from Last 4 Encounters:   07/01/22 69.2 kg (152 lb 9.6 oz)   05/11/22 65.1 kg (143 lb 8 oz)   03/21/22 65.3 kg (144 lb)   12/01/21 64.4 kg (142 lb)       BP Readings from Last 3 Encounters:   07/01/22 112/76   05/11/22 (!) 140/74   03/21/22 138/74     Pulse Readings from Last 4 Encounters:   07/01/22 72   05/11/22 68   03/21/22 66   12/01/21 67         ALLERGIES: No known allergies  PAST MEDICAL HISTORY:   Past Medical History:   Diagnosis Date     HTN (hypertension)       PAST SURGICAL HISTORY:  has a past surgical history that includes appendectomy; Open reduction internal fixation hip nailing (1/12/2014); Open reduction internal fixation hip (unk, prior to 2014); Arthroplasty hip (Right, 8/31/2015); and Remove hardware hip nailing (Right, 8/31/2015).  IMMUNIZATIONS:  Immunization History   Administered Date(s) Administered     COVID-19,PF,Moderna 01/07/2021, 02/03/2021     COVID-19,PF,Pfizer (12+ Yrs) 01/05/2021, 02/02/2021     Flu, Unspecified 01/01/2014, 10/24/2019     Influenza (IIV3) PF 11/09/1999, 12/06/2000, 11/29/2001, 11/26/2002, 12/08/2003, 10/29/2012, 01/01/2014, 10/21/2015, 10/20/2016, 10/03/2017, 10/11/2018,  10/24/2019     Influenza Vaccine Im 4yrs+ 4 Valent CCIIV4 10/24/2019     Influenza, Quad, High Dose, Pf, 65yr+ (Fluzone HD) 12/31/2013, 10/23/2019     Mantoux Tuberculin Skin Test 08/06/2015, 08/07/2015     Pneumo Conj 13-V (2010&after) 07/28/2015, 10/15/2015     Pneumococcal 23 valent 11/08/1999     Pneumococcal Conjugate, Unspecified 10/14/2015     Pneumococcal, Unspecified 10/15/2015     TDAP Vaccine (Adacel) 10/14/2015     Td (Adult), Adsorbed 10/15/2015     Tdap (Adacel,Boostrix) 10/15/2015     Zoster vaccine recombinant adjuvanted (SHINGRIX) 10/28/2012     Zoster vaccine, live 10/29/2012     Above immunizations pulled from [facility]. MIIC and facility records also reconciled. Outstanding information sent to  to update [facility].  Future immunizations are not needed at this point as all recommended immunizations are up to date.       Current Outpatient Medications:      acetaminophen (TYLENOL) 500 MG tablet, Take 2 tablets (1,000 mg) by mouth every 8 hours as needed for mild pain, Disp: , Rfl:      Nutritional Supplements (ENSURE PLUS PO), Take 4 oz by mouth 3 times daily, Disp: , Rfl:      OLANZapine (ZYPREXA) 2.5 MG tablet, Take 2.5 mg by mouth every other day, Disp: , Rfl:      pantoprazole (PROTONIX) 40 MG EC tablet, Take 1 tablet (40 mg) by mouth daily, Disp: , Rfl:      Case Management:  I have reviewed the facility/SNF care plan/MDS, including the falls risk, nutrition and pain screening. I also reviewed the current immunizations, and preventive care.. Future cancer screening is not clinically indicated secondary to age/goals of care Patient's desire to return to the community is not assessible due to cognitive impairment. Current Level of Care is appropriate.    Advance Directive Discussion:    I reviewed the current advanced directives as reflected in EPIC, the POLST and the facility chart, and verified the congruency of orders. I did not contacted the first party and discussed  "the plan of Care.  I did not due to cognitive impairment review the advance directives with the resident. Patient's goal is unobtainable secondary to cognitive impairment.    Team Discussion:  I communicated with the appropriate disciplines involved with the Plan of Care:   Nursing  ,    and Dietitian    Information reviewed:  Medications, vital signs, orders, and nursing notes.    ROS:  Unobtainable secondary to cognitive impairment.     Vitals:  /76   Pulse 72   Temp 97  F (36.1  C)   Resp 18   Ht 1.753 m (5' 9\")   Wt 69.2 kg (152 lb 9.6 oz)   SpO2 96%   BMI 22.54 kg/m   Body mass index is 22.54 kg/m .  Exam:  GENERAL APPEARANCE:  Alert, in no distress  ENT:  Mouth and posterior oropharynx normal, moist mucous membranes, [place]  RESP:  respiratory effort and palpation of chest normal, lungs clear to auscultation   CV:  Palpation and auscultation of heart done , regular rate and rhythm, no murmur, rub, or gallop  ABDOMEN:  normal bowel sounds, soft, nontender, no hepatosplenomegaly or other masses  M/S:   Gait and station normal  Digits and nails normal  SKIN:  Inspection of skin and subcutaneous tissue baseline, Palpation of skin and subcutaneous tissue baseline  NEURO:   Cranial nerves 2-12 are normal tested and grossly at patient's baseline  PSYCH:  insight and judgement impaired, memory impaired , affect and mood normal     Lab/Diagnostic data:   Labs done in SNF are in [facility]. Please refer to them using [employer]Care Everywhere. and Recent labs in EPIC reviewed by me today.     ASSESSMENT/PLAN   Diagnosis Comments   1. Chronic kidney disease, stage 2 (mild)  Chronic, baseline 0.67-0.94. Continue to renal dose and avoid nephrotoxic medications. BMP q 6 months and PRN.    2. Primary osteoarthritis involving multiple joints  Chronic, no longer ambulatory. Pain managed on PRN acetaminophen.    3. Primary hypertension  Chronic, stable off medications. Goal BP <140/90.    4. Late onset " Alzheimer's disease with behavioral disturbance (H)  Choric, behaviors continue to be a problem with cares. Just recently started on trazodone. Will continue to monitor. Expect further decline with progression of disease. Continue LTC support with medication administration, meals and safety.    5. Anemia due to blood loss, acute  Chronic, hemoglobin stable 15.2. CBC q6 months    6. Adult behavior problem  2/2 progression of dementia. See above. Continue current medications and monitor.          Orders:  The current medical regimen is effective; continue present plan and medications.      Electronically signed by:  Lexii Reid NP

## 2022-07-12 PROBLEM — N18.2 CHRONIC KIDNEY DISEASE, STAGE 2 (MILD): Status: ACTIVE | Noted: 2022-01-01

## 2022-08-04 NOTE — PROGRESS NOTES
Geriatrics Notification of Patient Death    Provider: GI Hercules CNP  Place of death: [facility]  Facility Type:  LTC    Caller: Nurse  Date of Death: 8/4/2022 @ 2:45 pm    Patient was on Hospice care: No  Patient was seen by [facility] Geriatrics provider: Yes; please explain: 7/1/22 by NP        Estella Padilla RN

## 2022-08-04 NOTE — TELEPHONE ENCOUNTER
[facility] Triage Nurse Telephone Encounter    Provider: GI Hercules CNP  Facility: [facility] Facility Type:  LTC    Caller: Shavonne  Call Back Number:     Allergies:    Allergies   Allergen Reactions     No Known Allergies         Reason for call: Nurse called to report that patient has been declining for the past couple of months.  Staff noticed this morning that patient is starting to mottle.  Right leg and hand are mottled, respirations increased to 26 with short shallow breaths, and audible gurgling noted.  Nurse is wondering if she can get a hospice consult ordered.        Verbal Order/Direction given by Provider: Advised staff to call family.  Okay for hospice consult.  Call back if comfort medications are needed.      Provider giving Order:  Nabila Velasquez MD    Verbal Order given to: Shavonne    UPDATE: Nurse called back and hospice came by to check on patient and they opted not to sign patient on as she will probably not make it to the end of the day.  Hospice advised that primary give comfort care orders.      -ORDERS GIVEN TO START MORPHINE 5 MG TID AND EVERY 2 HOURS PRN, ATIVAN 0.5 MG EVERY 4 HOURS PRN, AND ATROPINE 1% DROPS INSTILL 2 DROPS EVERY HOUR PRN FOR SECRETIONS.    Estella Padilla RN

## 2022-08-08 ENCOUNTER — PATIENT OUTREACH (OUTPATIENT)
Dept: GERIATRIC MEDICINE | Facility: CLINIC | Age: 87
End: 2022-08-08